# Patient Record
Sex: FEMALE | Race: OTHER | NOT HISPANIC OR LATINO
[De-identification: names, ages, dates, MRNs, and addresses within clinical notes are randomized per-mention and may not be internally consistent; named-entity substitution may affect disease eponyms.]

---

## 2019-09-28 ENCOUNTER — APPOINTMENT (OUTPATIENT)
Dept: ORTHOPEDIC SURGERY | Facility: CLINIC | Age: 54
End: 2019-09-28
Payer: MEDICARE

## 2019-09-28 ENCOUNTER — OUTPATIENT (OUTPATIENT)
Dept: OUTPATIENT SERVICES | Facility: HOSPITAL | Age: 54
LOS: 1 days | End: 2019-09-28
Payer: MEDICARE

## 2019-09-28 ENCOUNTER — APPOINTMENT (OUTPATIENT)
Dept: CT IMAGING | Facility: CLINIC | Age: 54
End: 2019-09-28
Payer: MEDICARE

## 2019-09-28 VITALS
HEART RATE: 83 BPM | DIASTOLIC BLOOD PRESSURE: 90 MMHG | WEIGHT: 175 LBS | HEIGHT: 64 IN | BODY MASS INDEX: 29.88 KG/M2 | SYSTOLIC BLOOD PRESSURE: 142 MMHG

## 2019-09-28 VITALS — WEIGHT: 175 LBS | HEIGHT: 64 IN | BODY MASS INDEX: 29.88 KG/M2

## 2019-09-28 DIAGNOSIS — Z80.9 FAMILY HISTORY OF MALIGNANT NEOPLASM, UNSPECIFIED: ICD-10-CM

## 2019-09-28 DIAGNOSIS — Z82.61 FAMILY HISTORY OF ARTHRITIS: ICD-10-CM

## 2019-09-28 DIAGNOSIS — Z80.0 FAMILY HISTORY OF MALIGNANT NEOPLASM OF DIGESTIVE ORGANS: ICD-10-CM

## 2019-09-28 DIAGNOSIS — Z98.89 OTHER SPECIFIED POSTPROCEDURAL STATES: Chronic | ICD-10-CM

## 2019-09-28 DIAGNOSIS — L40.9 PSORIASIS, UNSPECIFIED: ICD-10-CM

## 2019-09-28 DIAGNOSIS — M25.571 PAIN IN RIGHT ANKLE AND JOINTS OF RIGHT FOOT: ICD-10-CM

## 2019-09-28 DIAGNOSIS — Z80.3 FAMILY HISTORY OF MALIGNANT NEOPLASM OF BREAST: ICD-10-CM

## 2019-09-28 DIAGNOSIS — R10.9 UNSPECIFIED ABDOMINAL PAIN: ICD-10-CM

## 2019-09-28 DIAGNOSIS — Z78.9 OTHER SPECIFIED HEALTH STATUS: ICD-10-CM

## 2019-09-28 DIAGNOSIS — Z60.2 PROBLEMS RELATED TO LIVING ALONE: ICD-10-CM

## 2019-09-28 DIAGNOSIS — M76.61 ACHILLES TENDINITIS, RIGHT LEG: ICD-10-CM

## 2019-09-28 PROCEDURE — 82565 ASSAY OF CREATININE: CPT

## 2019-09-28 PROCEDURE — 74177 CT ABD & PELVIS W/CONTRAST: CPT | Mod: 26

## 2019-09-28 PROCEDURE — 74177 CT ABD & PELVIS W/CONTRAST: CPT

## 2019-09-28 PROCEDURE — 99203 OFFICE O/P NEW LOW 30 MIN: CPT

## 2019-09-28 SDOH — SOCIAL STABILITY - SOCIAL INSECURITY: PROBLEMS RELATED TO LIVING ALONE: Z60.2
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
## 2019-09-28 NOTE — PHYSICAL EXAM
[Slightly Antalgic] : slightly antalgic [DP] : dorsalis pedis 2+ and symmetric bilaterally [Normal] : Oriented to person, place, and time, insight and judgement were intact and the affect was normal [de-identified] : skin warm and well-perfused  [de-identified] : Right Foot/ankle: lateral ankle surgical scar \par mild hindfoot valgus\par pes planus deformity\par pain and restriction with dorsiflexion\par pain with plantarflexion, inversion, eversion\par diffusely tender about ankle, tender AT\par psoriasis plantar foot\par 2+ pitting pedal edema \par unable to single leg heel raise\par \par Left foot:\par pes planus, mild hindfoot valgus\par NT to palpation\par FROM without pain\par 1+ pedal edema\par \par  [de-identified] : overweight [de-identified] : Normal rate, no increased respiratory effort, no use of accessory muscles, no nasal flaring  [de-identified] : .

## 2019-09-28 NOTE — DISCUSSION/SUMMARY
[de-identified] : It is unclear what procedure the patient had performed on her ankle, but she has had problems ever since.  I've sent her for physical therapy. I recommended compression hose if she can tolerate it.  Ice as instructed.  She will obtain her operative reports and imaging studies and will follow up with Dr. Henning in one month.

## 2019-10-02 PROBLEM — Z60.2 PERSON LIVING ALONE: Status: ACTIVE | Noted: 2019-09-28

## 2019-10-03 ENCOUNTER — APPOINTMENT (OUTPATIENT)
Dept: ORTHOPEDIC SURGERY | Facility: CLINIC | Age: 54
End: 2019-10-03

## 2019-10-14 ENCOUNTER — APPOINTMENT (OUTPATIENT)
Dept: SURGERY | Facility: CLINIC | Age: 54
End: 2019-10-14
Payer: COMMERCIAL

## 2019-10-14 VITALS
RESPIRATION RATE: 15 BRPM | BODY MASS INDEX: 29.88 KG/M2 | HEART RATE: 81 BPM | TEMPERATURE: 98.4 F | OXYGEN SATURATION: 98 % | WEIGHT: 175 LBS | DIASTOLIC BLOOD PRESSURE: 86 MMHG | SYSTOLIC BLOOD PRESSURE: 123 MMHG | HEIGHT: 64 IN

## 2019-10-14 DIAGNOSIS — Z86.79 PERSONAL HISTORY OF OTHER DISEASES OF THE CIRCULATORY SYSTEM: ICD-10-CM

## 2019-10-14 DIAGNOSIS — Z87.09 PERSONAL HISTORY OF OTHER DISEASES OF THE RESPIRATORY SYSTEM: ICD-10-CM

## 2019-10-14 DIAGNOSIS — Z86.018 PERSONAL HISTORY OF OTHER BENIGN NEOPLASM: ICD-10-CM

## 2019-10-14 DIAGNOSIS — K21.9 GASTRO-ESOPHAGEAL REFLUX DISEASE W/OUT ESOPHAGITIS: ICD-10-CM

## 2019-10-14 DIAGNOSIS — Z87.39 PERSONAL HISTORY OF OTHER DISEASES OF THE MUSCULOSKELETAL SYSTEM AND CONNECTIVE TISSUE: ICD-10-CM

## 2019-10-14 PROCEDURE — 99205 OFFICE O/P NEW HI 60 MIN: CPT

## 2019-10-14 RX ORDER — OMEPRAZOLE MAGNESIUM 10 MG/1
GRANULE, DELAYED RELEASE ORAL
Refills: 0 | Status: DISCONTINUED | COMMUNITY
End: 2019-10-14

## 2019-10-14 RX ORDER — CHROMIUM 200 MCG
TABLET ORAL
Refills: 0 | Status: ACTIVE | COMMUNITY

## 2019-10-14 RX ORDER — TRIAMTERENE AND HYDROCHLOROTHIAZIDE 25; 37.5 MG/1; MG/1
37.5-25 TABLET ORAL
Refills: 0 | Status: ACTIVE | COMMUNITY

## 2019-10-14 RX ORDER — METFORMIN HYDROCHLORIDE 625 MG/1
TABLET ORAL
Refills: 0 | Status: DISCONTINUED | COMMUNITY
End: 2019-10-14

## 2019-10-14 RX ORDER — OMEPRAZOLE 20 MG/1
20 CAPSULE, DELAYED RELEASE ORAL
Refills: 0 | Status: ACTIVE | COMMUNITY

## 2019-10-14 RX ORDER — MONTELUKAST SODIUM 10 MG/1
TABLET, FILM COATED ORAL
Refills: 0 | Status: DISCONTINUED | COMMUNITY
End: 2019-10-14

## 2019-10-14 RX ORDER — PANCRELIPASE 36000; 180000; 114000 [USP'U]/1; [USP'U]/1; [USP'U]/1
CAPSULE, DELAYED RELEASE PELLETS ORAL
Refills: 0 | Status: ACTIVE | COMMUNITY

## 2019-10-14 RX ORDER — CETIRIZINE HYDROCHLORIDE 10 MG/1
CAPSULE, LIQUID FILLED ORAL
Refills: 0 | Status: ACTIVE | COMMUNITY

## 2019-10-14 RX ORDER — ALBUTEROL 90 MCG
AEROSOL (GRAM) INHALATION
Refills: 0 | Status: ACTIVE | COMMUNITY

## 2019-10-14 RX ORDER — ALBUTEROL SULFATE 4 MG
TABLET,EXTENDED RELEASE MULTIPHASE 12 HR ORAL
Refills: 0 | Status: DISCONTINUED | COMMUNITY
End: 2019-10-14

## 2019-10-14 RX ORDER — GUAIFENESIN 200 MG
CAPSULE ORAL
Refills: 0 | Status: DISCONTINUED | COMMUNITY
End: 2019-10-14

## 2019-10-14 RX ORDER — FLUTICASONE FUROATE AND VILANTEROL TRIFENATATE 50; 25 UG/1; UG/1
POWDER RESPIRATORY (INHALATION)
Refills: 0 | Status: ACTIVE | COMMUNITY

## 2019-10-14 RX ORDER — AMLODIPINE BESYLATE 5 MG/1
TABLET ORAL
Refills: 0 | Status: DISCONTINUED | COMMUNITY
End: 2019-10-14

## 2019-10-14 RX ORDER — AMLODIPINE BESYLATE 5 MG/1
TABLET ORAL
Refills: 0 | Status: ACTIVE | COMMUNITY

## 2019-10-14 NOTE — PHYSICAL EXAM
[JVD] : no jugular venous distention  [Normal Breath Sounds] : Normal breath sounds [Abdominal Masses] : No abdominal masses [No Rash or Lesion] : No rash or lesion [Normal Heart Sounds] : normal heart sounds [Alert] : alert [Oriented to Person] : oriented to person [Oriented to Place] : oriented to place [Oriented to Time] : oriented to time [Calm] : calm [de-identified] : nonicteric [de-identified] : ambulating without difficulty [de-identified] : soft mild obesity o hernias or masses present mild upper quadrant discomfort no peritonitis [de-identified] : full range of motion

## 2019-10-14 NOTE — ASSESSMENT
[FreeTextEntry1] : 53-year-old female with chronic biliary colic and cholelithiasis on sonogram endoscopy and colonoscopy were performed she was treated for H. pylori. But still has discomfort in the right upper quadrant radiating to the back. Given the sonographic findings and the history we have recommended a laparoscopic cholecystectomy. The risks benefits and expectations were discussed at length with the patient all of her questions were answered.

## 2019-10-14 NOTE — REVIEW OF SYSTEMS
[As Noted in HPI] : as noted in HPI [Negative] : Heme/Lymph [Heartburn] : heartburn [Joint Pain] : joint pain [Dizziness] : dizziness [Depression] : depression [FreeTextEntry7] : Abdominal bloating

## 2019-10-14 NOTE — HISTORY OF PRESENT ILLNESS
[de-identified] : Abdominal sonogram from 8/26/18 demonstrated cholelithiasis without significant gallbladder wall thickening or pericholecystic fluid.

## 2019-10-14 NOTE — CONSULT LETTER
[Dear  ___] : Dear  [unfilled], [Consult Letter:] : I had the pleasure of evaluating your patient, [unfilled]. [Please see my note below.] : Please see my note below. [Sincerely,] : Sincerely, [Consult Closing:] : Thank you very much for allowing me to participate in the care of this patient.  If you have any questions, please do not hesitate to contact me. [FreeTextEntry3] : Alejandro Padilla MD, FACS, FASMBS\par Chief Division of Minimally Invasive Surgery\par Co-Director of Bariatric Surgery \par [facility]\[place] [address]

## 2019-10-29 ENCOUNTER — APPOINTMENT (OUTPATIENT)
Dept: ORTHOPEDIC SURGERY | Facility: CLINIC | Age: 54
End: 2019-10-29
Payer: MEDICARE

## 2019-10-29 PROCEDURE — 73610 X-RAY EXAM OF ANKLE: CPT | Mod: 26,RT

## 2019-10-29 PROCEDURE — 73620 X-RAY EXAM OF FOOT: CPT | Mod: 26

## 2019-10-29 PROCEDURE — 99214 OFFICE O/P EST MOD 30 MIN: CPT

## 2019-11-26 ENCOUNTER — APPOINTMENT (OUTPATIENT)
Dept: ORTHOPEDIC SURGERY | Facility: CLINIC | Age: 54
End: 2019-11-26
Payer: MEDICARE

## 2019-11-26 DIAGNOSIS — M24.9 JOINT DERANGEMENT, UNSPECIFIED: ICD-10-CM

## 2019-11-26 DIAGNOSIS — M67.88 OTHER SPECIFIED DISORDERS OF SYNOVIUM AND TENDON, OTHER SITE: ICD-10-CM

## 2019-11-26 PROCEDURE — 99213 OFFICE O/P EST LOW 20 MIN: CPT

## 2019-11-27 PROBLEM — M67.88 ACHILLES TENDINOSIS OF RIGHT LOWER EXTREMITY: Status: ACTIVE | Noted: 2019-10-29

## 2019-11-27 PROBLEM — M24.9 INTERNAL DERANGEMENT OF ANKLE: Status: ACTIVE | Noted: 2019-10-29

## 2019-12-24 ENCOUNTER — APPOINTMENT (OUTPATIENT)
Dept: ORTHOPEDIC SURGERY | Facility: CLINIC | Age: 54
End: 2019-12-24

## 2020-01-07 ENCOUNTER — OUTPATIENT (OUTPATIENT)
Dept: OUTPATIENT SERVICES | Facility: HOSPITAL | Age: 55
LOS: 1 days | End: 2020-01-07
Payer: MEDICARE

## 2020-01-07 VITALS
TEMPERATURE: 98 F | RESPIRATION RATE: 18 BRPM | SYSTOLIC BLOOD PRESSURE: 148 MMHG | HEIGHT: 64 IN | HEART RATE: 92 BPM | OXYGEN SATURATION: 97 % | DIASTOLIC BLOOD PRESSURE: 94 MMHG | WEIGHT: 179.02 LBS

## 2020-01-07 DIAGNOSIS — I10 ESSENTIAL (PRIMARY) HYPERTENSION: ICD-10-CM

## 2020-01-07 DIAGNOSIS — Z01.818 ENCOUNTER FOR OTHER PREPROCEDURAL EXAMINATION: ICD-10-CM

## 2020-01-07 DIAGNOSIS — Z98.890 OTHER SPECIFIED POSTPROCEDURAL STATES: Chronic | ICD-10-CM

## 2020-01-07 DIAGNOSIS — J45.909 UNSPECIFIED ASTHMA, UNCOMPLICATED: ICD-10-CM

## 2020-01-07 DIAGNOSIS — Z98.89 OTHER SPECIFIED POSTPROCEDURAL STATES: Chronic | ICD-10-CM

## 2020-01-07 DIAGNOSIS — Z91.040 LATEX ALLERGY STATUS: ICD-10-CM

## 2020-01-07 DIAGNOSIS — K80.20 CALCULUS OF GALLBLADDER WITHOUT CHOLECYSTITIS WITHOUT OBSTRUCTION: ICD-10-CM

## 2020-01-07 DIAGNOSIS — K21.9 GASTRO-ESOPHAGEAL REFLUX DISEASE WITHOUT ESOPHAGITIS: ICD-10-CM

## 2020-01-07 DIAGNOSIS — K80.50 CALCULUS OF BILE DUCT WITHOUT CHOLANGITIS OR CHOLECYSTITIS WITHOUT OBSTRUCTION: ICD-10-CM

## 2020-01-07 LAB
ALBUMIN SERPL ELPH-MCNC: 4.3 G/DL — SIGNIFICANT CHANGE UP (ref 3.3–5)
ALP SERPL-CCNC: 105 U/L — SIGNIFICANT CHANGE UP (ref 40–120)
ALT FLD-CCNC: 24 U/L — SIGNIFICANT CHANGE UP (ref 10–45)
ANION GAP SERPL CALC-SCNC: 16 MMOL/L — SIGNIFICANT CHANGE UP (ref 5–17)
AST SERPL-CCNC: 16 U/L — SIGNIFICANT CHANGE UP (ref 10–40)
BILIRUB SERPL-MCNC: 0.2 MG/DL — SIGNIFICANT CHANGE UP (ref 0.2–1.2)
BUN SERPL-MCNC: 12 MG/DL — SIGNIFICANT CHANGE UP (ref 7–23)
CALCIUM SERPL-MCNC: 9.3 MG/DL — SIGNIFICANT CHANGE UP (ref 8.4–10.5)
CHLORIDE SERPL-SCNC: 96 MMOL/L — SIGNIFICANT CHANGE UP (ref 96–108)
CO2 SERPL-SCNC: 26 MMOL/L — SIGNIFICANT CHANGE UP (ref 22–31)
CREAT SERPL-MCNC: 0.61 MG/DL — SIGNIFICANT CHANGE UP (ref 0.5–1.3)
GLUCOSE SERPL-MCNC: 112 MG/DL — HIGH (ref 70–99)
HCT VFR BLD CALC: 40.6 % — SIGNIFICANT CHANGE UP (ref 34.5–45)
HGB BLD-MCNC: 13.5 G/DL — SIGNIFICANT CHANGE UP (ref 11.5–15.5)
MCHC RBC-ENTMCNC: 29.3 PG — SIGNIFICANT CHANGE UP (ref 27–34)
MCHC RBC-ENTMCNC: 33.3 GM/DL — SIGNIFICANT CHANGE UP (ref 32–36)
MCV RBC AUTO: 88.3 FL — SIGNIFICANT CHANGE UP (ref 80–100)
PLATELET # BLD AUTO: 471 K/UL — HIGH (ref 150–400)
POTASSIUM SERPL-MCNC: 2.9 MMOL/L — CRITICAL LOW (ref 3.5–5.3)
POTASSIUM SERPL-SCNC: 2.9 MMOL/L — CRITICAL LOW (ref 3.5–5.3)
PROT SERPL-MCNC: 7.5 G/DL — SIGNIFICANT CHANGE UP (ref 6–8.3)
RBC # BLD: 4.6 M/UL — SIGNIFICANT CHANGE UP (ref 3.8–5.2)
RBC # FLD: 13.9 % — SIGNIFICANT CHANGE UP (ref 10.3–14.5)
SODIUM SERPL-SCNC: 138 MMOL/L — SIGNIFICANT CHANGE UP (ref 135–145)
WBC # BLD: 11.21 K/UL — HIGH (ref 3.8–10.5)
WBC # FLD AUTO: 11.21 K/UL — HIGH (ref 3.8–10.5)

## 2020-01-07 PROCEDURE — G0463: CPT

## 2020-01-07 PROCEDURE — 80053 COMPREHEN METABOLIC PANEL: CPT

## 2020-01-07 PROCEDURE — 85027 COMPLETE CBC AUTOMATED: CPT

## 2020-01-07 RX ORDER — CEFAZOLIN SODIUM 1 G
2000 VIAL (EA) INJECTION ONCE
Refills: 0 | Status: DISCONTINUED | OUTPATIENT
Start: 2020-01-09 | End: 2020-02-02

## 2020-01-07 NOTE — H&P PST ADULT - NSICDXPROBLEM_GEN_ALL_CORE_FT
PROBLEM DIAGNOSES  Problem: Calculus of gallbladder without cholecystitis without obstruction  Assessment and Plan: laparoscopic cholecystectomy    Problem: Chronic GERD  Assessment and Plan: continue PPI    Problem: Hypertension  Assessment and Plan: continue meds    Problem: Asthma  Assessment and Plan: continue meds PROBLEM DIAGNOSES  Problem: Latex allergy  Assessment and Plan: notified OR booking    Problem: Calculus of gallbladder without cholecystitis without obstruction  Assessment and Plan: laparoscopic cholecystectomy    Problem: Chronic GERD  Assessment and Plan: continue PPI    Problem: Hypertension  Assessment and Plan: continue meds    Problem: Asthma  Assessment and Plan: continue meds

## 2020-01-07 NOTE — H&P PST ADULT - NSICDXPASTSURGICALHX_GEN_ALL_CORE_FT
PAST SURGICAL HISTORY:  History of ankle surgery     History of myomectomy     History of tonsillectomy and adenoidectomy at age 5

## 2020-01-07 NOTE — H&P PST ADULT - NSANTHOSAYNRD_GEN_A_CORE
No. TRISTIN screening performed.  STOP BANG Legend: 0-2 = LOW Risk; 3-4 = INTERMEDIATE Risk; 5-8 = HIGH Risk

## 2020-01-07 NOTE — H&P PST ADULT - NSICDXFAMILYHX_GEN_ALL_CORE_FT
FAMILY HISTORY:  Family history of hyperlipidemia  Family history of hypertension    Mother  Still living? Yes, Estimated age: 71-80  Family history of diabetes mellitus, Age at diagnosis: Age Unknown

## 2020-01-07 NOTE — H&P PST ADULT - NSICDXPASTMEDICALHX_GEN_ALL_CORE_FT
PAST MEDICAL HISTORY:  Asthma uses albuterol PRN , stable    Eczema     GERD (gastroesophageal reflux disease)     Hiatal hernia     HTN (hypertension)     Menorrhagia     Seasonal allergies

## 2020-01-08 ENCOUNTER — RESULT REVIEW (OUTPATIENT)
Age: 55
End: 2020-01-08

## 2020-01-08 DIAGNOSIS — E87.6 HYPOKALEMIA: ICD-10-CM

## 2020-01-08 RX ORDER — POTASSIUM CHLORIDE 1500 MG/1
20 TABLET, EXTENDED RELEASE ORAL
Qty: 3 | Refills: 0 | Status: ACTIVE | COMMUNITY
Start: 2020-01-08 | End: 1900-01-01

## 2020-01-09 ENCOUNTER — OUTPATIENT (OUTPATIENT)
Dept: OUTPATIENT SERVICES | Facility: HOSPITAL | Age: 55
LOS: 1 days | End: 2020-01-09
Payer: MEDICARE

## 2020-01-09 ENCOUNTER — APPOINTMENT (OUTPATIENT)
Dept: SURGERY | Facility: HOSPITAL | Age: 55
End: 2020-01-09
Payer: MEDICARE

## 2020-01-09 VITALS — OXYGEN SATURATION: 97 % | RESPIRATION RATE: 16 BRPM | HEART RATE: 84 BPM

## 2020-01-09 VITALS
TEMPERATURE: 98 F | HEIGHT: 64 IN | OXYGEN SATURATION: 98 % | DIASTOLIC BLOOD PRESSURE: 84 MMHG | HEART RATE: 81 BPM | RESPIRATION RATE: 18 BRPM | SYSTOLIC BLOOD PRESSURE: 128 MMHG | WEIGHT: 179.02 LBS

## 2020-01-09 DIAGNOSIS — Z98.89 OTHER SPECIFIED POSTPROCEDURAL STATES: Chronic | ICD-10-CM

## 2020-01-09 DIAGNOSIS — K80.50 CALCULUS OF BILE DUCT WITHOUT CHOLANGITIS OR CHOLECYSTITIS WITHOUT OBSTRUCTION: ICD-10-CM

## 2020-01-09 DIAGNOSIS — Z98.890 OTHER SPECIFIED POSTPROCEDURAL STATES: Chronic | ICD-10-CM

## 2020-01-09 LAB
BASE EXCESS BLDV CALC-SCNC: 3.9 MMOL/L — HIGH (ref -2–2)
CA-I SERPL-SCNC: 1.19 MMOL/L — SIGNIFICANT CHANGE UP (ref 1.12–1.3)
CHLORIDE BLDV-SCNC: 103 MMOL/L — SIGNIFICANT CHANGE UP (ref 96–108)
CO2 BLDV-SCNC: 31 MMOL/L — HIGH (ref 22–30)
GAS PNL BLDV: 140 MMOL/L — SIGNIFICANT CHANGE UP (ref 135–145)
GAS PNL BLDV: SIGNIFICANT CHANGE UP
GAS PNL BLDV: SIGNIFICANT CHANGE UP
GLUCOSE BLDV-MCNC: 118 MG/DL — HIGH (ref 70–99)
HCG UR QL: NEGATIVE — SIGNIFICANT CHANGE UP
HCO3 BLDV-SCNC: 29 MMOL/L — SIGNIFICANT CHANGE UP (ref 21–29)
HCT VFR BLDA CALC: 42 % — SIGNIFICANT CHANGE UP (ref 39–50)
HGB BLD CALC-MCNC: 13.7 G/DL — SIGNIFICANT CHANGE UP (ref 11.5–15.5)
LACTATE BLDV-MCNC: 1.9 MMOL/L — SIGNIFICANT CHANGE UP (ref 0.7–2)
OTHER CELLS CSF MANUAL: 8 ML/DL — LOW (ref 18–22)
PCO2 BLDV: 49 MMHG — SIGNIFICANT CHANGE UP (ref 35–50)
PH BLDV: 7.39 — SIGNIFICANT CHANGE UP (ref 7.35–7.45)
PO2 BLDV: 25 MMHG — SIGNIFICANT CHANGE UP (ref 25–45)
POTASSIUM BLDV-SCNC: 3.3 MMOL/L — LOW (ref 3.5–5.3)
SAO2 % BLDV: 41 % — LOW (ref 67–88)

## 2020-01-09 PROCEDURE — 82330 ASSAY OF CALCIUM: CPT

## 2020-01-09 PROCEDURE — 82803 BLOOD GASES ANY COMBINATION: CPT

## 2020-01-09 PROCEDURE — 81025 URINE PREGNANCY TEST: CPT

## 2020-01-09 PROCEDURE — 85014 HEMATOCRIT: CPT

## 2020-01-09 PROCEDURE — 88304 TISSUE EXAM BY PATHOLOGIST: CPT | Mod: 26

## 2020-01-09 PROCEDURE — 84132 ASSAY OF SERUM POTASSIUM: CPT

## 2020-01-09 PROCEDURE — 88304 TISSUE EXAM BY PATHOLOGIST: CPT

## 2020-01-09 PROCEDURE — 82435 ASSAY OF BLOOD CHLORIDE: CPT

## 2020-01-09 PROCEDURE — 47562 LAPAROSCOPIC CHOLECYSTECTOMY: CPT

## 2020-01-09 PROCEDURE — 84295 ASSAY OF SERUM SODIUM: CPT

## 2020-01-09 PROCEDURE — C1889: CPT

## 2020-01-09 PROCEDURE — 83605 ASSAY OF LACTIC ACID: CPT

## 2020-01-09 PROCEDURE — 82947 ASSAY GLUCOSE BLOOD QUANT: CPT

## 2020-01-09 RX ORDER — NYSTATIN 500MM UNIT
4 POWDER (EA) MISCELLANEOUS
Qty: 0 | Refills: 0 | DISCHARGE

## 2020-01-09 RX ORDER — FLUTICASONE FUROATE AND VILANTEROL TRIFENATATE 100; 25 UG/1; UG/1
1 POWDER RESPIRATORY (INHALATION)
Qty: 0 | Refills: 0 | DISCHARGE

## 2020-01-09 RX ORDER — ONDANSETRON 8 MG/1
4 TABLET, FILM COATED ORAL EVERY 4 HOURS
Refills: 0 | Status: DISCONTINUED | OUTPATIENT
Start: 2020-01-09 | End: 2020-01-09

## 2020-01-09 RX ORDER — SODIUM CHLORIDE 9 MG/ML
1000 INJECTION, SOLUTION INTRAVENOUS
Refills: 0 | Status: DISCONTINUED | OUTPATIENT
Start: 2020-01-09 | End: 2020-02-02

## 2020-01-09 RX ORDER — OMEPRAZOLE 10 MG/1
1 CAPSULE, DELAYED RELEASE ORAL
Qty: 0 | Refills: 0 | DISCHARGE

## 2020-01-09 RX ORDER — CHOLECALCIFEROL (VITAMIN D3) 125 MCG
1 CAPSULE ORAL
Qty: 0 | Refills: 0 | DISCHARGE

## 2020-01-09 RX ORDER — LIPASE/PROTEASE/AMYLASE 16-48-48K
1 CAPSULE,DELAYED RELEASE (ENTERIC COATED) ORAL
Qty: 0 | Refills: 0 | DISCHARGE

## 2020-01-09 RX ORDER — OXYCODONE HYDROCHLORIDE 5 MG/1
1 TABLET ORAL
Qty: 15 | Refills: 0
Start: 2020-01-09 | End: 2020-01-13

## 2020-01-09 RX ORDER — DIAZEPAM 5 MG
1 TABLET ORAL
Qty: 0 | Refills: 0 | DISCHARGE

## 2020-01-09 RX ORDER — OXYCODONE HYDROCHLORIDE 5 MG/1
5 TABLET ORAL ONCE
Refills: 0 | Status: DISCONTINUED | OUTPATIENT
Start: 2020-01-09 | End: 2020-01-09

## 2020-01-09 RX ORDER — AMLODIPINE BESYLATE 2.5 MG/1
1 TABLET ORAL
Qty: 0 | Refills: 0 | DISCHARGE

## 2020-01-09 RX ORDER — ASCORBIC ACID 60 MG
1 TABLET,CHEWABLE ORAL
Qty: 0 | Refills: 0 | DISCHARGE

## 2020-01-09 RX ORDER — ALBUTEROL 90 UG/1
2 AEROSOL, METERED ORAL
Qty: 0 | Refills: 0 | DISCHARGE

## 2020-01-09 RX ORDER — CHLORHEXIDINE GLUCONATE 213 G/1000ML
1 SOLUTION TOPICAL ONCE
Refills: 0 | Status: DISCONTINUED | OUTPATIENT
Start: 2020-01-09 | End: 2020-01-09

## 2020-01-09 RX ORDER — TRIAMTERENE/HYDROCHLOROTHIAZID 75 MG-50MG
1 TABLET ORAL
Qty: 0 | Refills: 0 | DISCHARGE

## 2020-01-09 RX ORDER — MELOXICAM 15 MG/1
1 TABLET ORAL
Qty: 0 | Refills: 0 | DISCHARGE

## 2020-01-09 RX ORDER — CETIRIZINE HYDROCHLORIDE 10 MG/1
1 TABLET ORAL
Qty: 0 | Refills: 0 | DISCHARGE

## 2020-01-09 RX ORDER — HYDROMORPHONE HYDROCHLORIDE 2 MG/ML
0.25 INJECTION INTRAMUSCULAR; INTRAVENOUS; SUBCUTANEOUS
Refills: 0 | Status: DISCONTINUED | OUTPATIENT
Start: 2020-01-09 | End: 2020-01-09

## 2020-01-09 RX ORDER — ESCITALOPRAM OXALATE 10 MG/1
1 TABLET, FILM COATED ORAL
Qty: 0 | Refills: 0 | DISCHARGE

## 2020-01-09 RX ORDER — LIDOCAINE HCL 20 MG/ML
0.2 VIAL (ML) INJECTION ONCE
Refills: 0 | Status: DISCONTINUED | OUTPATIENT
Start: 2020-01-09 | End: 2020-01-09

## 2020-01-09 RX ORDER — ACETAMINOPHEN 500 MG
975 TABLET ORAL ONCE
Refills: 0 | Status: COMPLETED | OUTPATIENT
Start: 2020-01-09 | End: 2020-01-09

## 2020-01-09 RX ORDER — SODIUM CHLORIDE 9 MG/ML
3 INJECTION INTRAMUSCULAR; INTRAVENOUS; SUBCUTANEOUS EVERY 8 HOURS
Refills: 0 | Status: DISCONTINUED | OUTPATIENT
Start: 2020-01-09 | End: 2020-01-09

## 2020-01-09 RX ADMIN — HYDROMORPHONE HYDROCHLORIDE 0.25 MILLIGRAM(S): 2 INJECTION INTRAMUSCULAR; INTRAVENOUS; SUBCUTANEOUS at 12:45

## 2020-01-09 RX ADMIN — HYDROMORPHONE HYDROCHLORIDE 0.25 MILLIGRAM(S): 2 INJECTION INTRAMUSCULAR; INTRAVENOUS; SUBCUTANEOUS at 11:29

## 2020-01-09 RX ADMIN — OXYCODONE HYDROCHLORIDE 5 MILLIGRAM(S): 5 TABLET ORAL at 15:13

## 2020-01-09 RX ADMIN — HYDROMORPHONE HYDROCHLORIDE 0.25 MILLIGRAM(S): 2 INJECTION INTRAMUSCULAR; INTRAVENOUS; SUBCUTANEOUS at 13:00

## 2020-01-09 RX ADMIN — Medication 975 MILLIGRAM(S): at 17:21

## 2020-01-09 RX ADMIN — HYDROMORPHONE HYDROCHLORIDE 0.25 MILLIGRAM(S): 2 INJECTION INTRAMUSCULAR; INTRAVENOUS; SUBCUTANEOUS at 11:45

## 2020-01-09 RX ADMIN — OXYCODONE HYDROCHLORIDE 5 MILLIGRAM(S): 5 TABLET ORAL at 14:43

## 2020-01-09 NOTE — ASU DISCHARGE PLAN (ADULT/PEDIATRIC) - PAIN MANAGEMENT
Prescriptions electronically submitted to pharmacy from [place]/Take over the counter pain medication

## 2020-01-09 NOTE — PRE-ANESTHESIA EVALUATION ADULT - NSANTHPMHFT_GEN_ALL_CORE
Asthma - takes Breo daily, Albuterol PRN - last use this AM prophylactically, no h/o hospitalizations/intubations for asthma  Hiatal hernia/GERD - controlled with Rx  Hypokalemia with PST labs - VBG this AM, K at 3.3

## 2020-01-09 NOTE — ASU DISCHARGE PLAN (ADULT/PEDIATRIC) - CARE PROVIDER_API CALL
Alejandro Padilla)  Surgery  [address], [address]  [place] [address]  Phone: [phone]  Fax: [phone]  Established Patient  Follow Up Time: 2 weeks

## 2020-01-09 NOTE — ASU DISCHARGE PLAN (ADULT/PEDIATRIC) - CALL YOUR DOCTOR IF YOU HAVE ANY OF THE FOLLOWING:
Wound/Surgical Site with redness, or foul smelling discharge or pus/Inability to tolerate liquids or foods/Nausea and vomiting that does not stop/Fever greater than (need to indicate Fahrenheit or Celsius)/Pain not relieved by Medications/Bleeding that does not stop

## 2020-01-13 DIAGNOSIS — K59.00 CONSTIPATION, UNSPECIFIED: ICD-10-CM

## 2020-01-24 ENCOUNTER — APPOINTMENT (OUTPATIENT)
Dept: SURGERY | Facility: CLINIC | Age: 55
End: 2020-01-24
Payer: MEDICARE

## 2020-01-24 VITALS
TEMPERATURE: 98.2 F | OXYGEN SATURATION: 97 % | HEART RATE: 81 BPM | SYSTOLIC BLOOD PRESSURE: 137 MMHG | DIASTOLIC BLOOD PRESSURE: 90 MMHG | RESPIRATION RATE: 17 BRPM | BODY MASS INDEX: 29.88 KG/M2 | WEIGHT: 175 LBS | HEIGHT: 64 IN

## 2020-01-24 DIAGNOSIS — Z09 ENCOUNTER FOR FOLLOW-UP EXAMINATION AFTER COMPLETED TREATMENT FOR CONDITIONS OTHER THAN MALIGNANT NEOPLASM: ICD-10-CM

## 2020-01-24 DIAGNOSIS — K80.50 CALCULUS OF BILE DUCT W/OUT CHOLANGITIS OR CHOLECYSTITIS W/OUT OBSTRUCTION: ICD-10-CM

## 2020-01-24 DIAGNOSIS — Z87.19 PERSONAL HISTORY OF OTHER DISEASES OF THE DIGESTIVE SYSTEM: ICD-10-CM

## 2020-01-24 PROCEDURE — 99024 POSTOP FOLLOW-UP VISIT: CPT

## 2020-01-24 RX ORDER — ESCITALOPRAM OXALATE 5 MG/1
TABLET, FILM COATED ORAL
Refills: 0 | Status: DISCONTINUED | COMMUNITY
End: 2020-01-24

## 2020-01-24 RX ORDER — LORATADINE 5 MG
17 TABLET,CHEWABLE ORAL
Qty: 1 | Refills: 11 | Status: DISCONTINUED | COMMUNITY
Start: 2020-01-13 | End: 2020-01-24

## 2020-01-24 RX ORDER — MONTELUKAST SODIUM 10 MG/1
TABLET, FILM COATED ORAL
Refills: 0 | Status: DISCONTINUED | COMMUNITY
End: 2020-01-24

## 2020-01-24 RX ORDER — VITAMIN E ACID SUCCINATE 268 MG
TABLET ORAL
Refills: 0 | Status: ACTIVE | COMMUNITY

## 2020-01-24 RX ORDER — PREDNISONE 50 MG/1
TABLET ORAL
Refills: 0 | Status: DISCONTINUED | COMMUNITY
End: 2020-01-24

## 2020-01-24 NOTE — REASON FOR VISIT
[Post Op: _________] : a [unfilled] post op visit [FreeTextEntry1] : followup after her laparoscopic cholecystectomy

## 2020-01-24 NOTE — HISTORY OF PRESENT ILLNESS
[de-identified] : Austin is a 55 y/o female here for a post op visit following a lap breonna. Path =cholelithiasis with chronic cholecystitis and cholesterolosis. [de-identified] : The patient initially had umbilical pain after the surgery, but now complains of an intermittent right upper quadrant soreness. It gets worse after sitting up for several hours. She has been tolerating a diet, but has had some nausea over the past few days. She denies any fever, chills or vomiting. She feels that her asthma and psoriasis have been aggravated since her surgery, however the timing also corresponds with a change in the weather. She states her pain has improved since the immediate post operative period. 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 no

## 2020-01-24 NOTE — PHYSICAL EXAM
[Normal Breath Sounds] : Normal breath sounds [Normal Rate and Rhythm] : normal rate and rhythm [No Rash or Lesion] : No rash or lesion [Alert] : alert [Oriented to Time] : oriented to time [Oriented to Person] : oriented to person [Oriented to Place] : oriented to place [Calm] : calm [de-identified] : no scleral icterus [de-identified] : No acute distress [de-identified] : soft, nondistended, mild RUQ tenderness to palpation, incisions clean/dry/intact

## 2020-01-24 NOTE — ASSESSMENT
[FreeTextEntry1] : 54 year old female s/p laparoscopic cholecystectomy for chronic cholecystitis\par \par patient is doing well she was seen and examined in the note verified she will followup if she has any complaintsall of her questions were answered

## 2020-01-24 NOTE — REVIEW OF SYSTEMS
[Wheezing] : wheezing [Cough] : cough [Abdominal Pain] : abdominal pain [Negative] : Heme/Lymph [Vomiting] : no vomiting [Constipation] : no constipation [FreeTextEntry7] : nausea [de-identified] : dry skin/psoriasis

## 2020-02-13 ENCOUNTER — APPOINTMENT (OUTPATIENT)
Dept: NEUROLOGY | Facility: CLINIC | Age: 55
End: 2020-02-13

## 2020-02-25 ENCOUNTER — APPOINTMENT (OUTPATIENT)
Dept: ORTHOPEDIC SURGERY | Facility: CLINIC | Age: 55
End: 2020-02-25
Payer: MEDICARE

## 2020-02-25 DIAGNOSIS — M25.674 STIFFNESS OF RIGHT FOOT, NOT ELSEWHERE CLASSIFIED: ICD-10-CM

## 2020-02-25 PROCEDURE — 99214 OFFICE O/P EST MOD 30 MIN: CPT

## 2020-03-01 ENCOUNTER — OUTPATIENT (OUTPATIENT)
Dept: OUTPATIENT SERVICES | Facility: HOSPITAL | Age: 55
LOS: 1 days | End: 2020-03-01

## 2020-03-01 DIAGNOSIS — Z98.890 OTHER SPECIFIED POSTPROCEDURAL STATES: Chronic | ICD-10-CM

## 2020-03-01 DIAGNOSIS — Z98.89 OTHER SPECIFIED POSTPROCEDURAL STATES: Chronic | ICD-10-CM

## 2020-03-09 ENCOUNTER — APPOINTMENT (OUTPATIENT)
Dept: NEUROLOGY | Facility: CLINIC | Age: 55
End: 2020-03-09
Payer: MEDICARE

## 2020-03-09 ENCOUNTER — EMERGENCY (EMERGENCY)
Facility: HOSPITAL | Age: 55
LOS: 1 days | Discharge: HOME | End: 2020-03-09
Attending: EMERGENCY MEDICINE
Payer: MEDICARE

## 2020-03-09 VITALS
TEMPERATURE: 98 F | WEIGHT: 179.9 LBS | RESPIRATION RATE: 19 BRPM | SYSTOLIC BLOOD PRESSURE: 138 MMHG | DIASTOLIC BLOOD PRESSURE: 94 MMHG | HEART RATE: 91 BPM | HEIGHT: 64 IN | OXYGEN SATURATION: 98 %

## 2020-03-09 VITALS
TEMPERATURE: 98.4 F | HEIGHT: 64 IN | DIASTOLIC BLOOD PRESSURE: 90 MMHG | BODY MASS INDEX: 29.88 KG/M2 | HEART RATE: 90 BPM | SYSTOLIC BLOOD PRESSURE: 141 MMHG | WEIGHT: 175 LBS

## 2020-03-09 DIAGNOSIS — Z98.89 OTHER SPECIFIED POSTPROCEDURAL STATES: Chronic | ICD-10-CM

## 2020-03-09 DIAGNOSIS — Z98.890 OTHER SPECIFIED POSTPROCEDURAL STATES: Chronic | ICD-10-CM

## 2020-03-09 LAB
ALBUMIN SERPL ELPH-MCNC: 3.9 G/DL — SIGNIFICANT CHANGE UP (ref 3.3–5)
ALP SERPL-CCNC: 98 U/L — SIGNIFICANT CHANGE UP (ref 40–120)
ALT FLD-CCNC: 25 U/L — SIGNIFICANT CHANGE UP (ref 10–45)
ANION GAP SERPL CALC-SCNC: 15 MMOL/L — SIGNIFICANT CHANGE UP (ref 5–17)
APTT BLD: 27 SEC — LOW (ref 27.5–36.3)
AST SERPL-CCNC: 14 U/L — SIGNIFICANT CHANGE UP (ref 10–40)
BASOPHILS # BLD AUTO: 0.04 K/UL — SIGNIFICANT CHANGE UP (ref 0–0.2)
BASOPHILS NFR BLD AUTO: 0.3 % — SIGNIFICANT CHANGE UP (ref 0–2)
BILIRUB SERPL-MCNC: 0.2 MG/DL — SIGNIFICANT CHANGE UP (ref 0.2–1.2)
BUN SERPL-MCNC: 12 MG/DL — SIGNIFICANT CHANGE UP (ref 7–23)
CALCIUM SERPL-MCNC: 9.1 MG/DL — SIGNIFICANT CHANGE UP (ref 8.4–10.5)
CHLORIDE SERPL-SCNC: 100 MMOL/L — SIGNIFICANT CHANGE UP (ref 96–108)
CO2 SERPL-SCNC: 24 MMOL/L — SIGNIFICANT CHANGE UP (ref 22–31)
CREAT SERPL-MCNC: 0.59 MG/DL — SIGNIFICANT CHANGE UP (ref 0.5–1.3)
EOSINOPHIL # BLD AUTO: 0.09 K/UL — SIGNIFICANT CHANGE UP (ref 0–0.5)
EOSINOPHIL NFR BLD AUTO: 0.7 % — SIGNIFICANT CHANGE UP (ref 0–6)
GLUCOSE SERPL-MCNC: 90 MG/DL — SIGNIFICANT CHANGE UP (ref 70–99)
HCT VFR BLD CALC: 40.3 % — SIGNIFICANT CHANGE UP (ref 34.5–45)
HGB BLD-MCNC: 13 G/DL — SIGNIFICANT CHANGE UP (ref 11.5–15.5)
IMM GRANULOCYTES NFR BLD AUTO: 0.7 % — SIGNIFICANT CHANGE UP (ref 0–1.5)
INR BLD: 0.97 RATIO — SIGNIFICANT CHANGE UP (ref 0.88–1.16)
LYMPHOCYTES # BLD AUTO: 23.1 % — SIGNIFICANT CHANGE UP (ref 13–44)
LYMPHOCYTES # BLD AUTO: 3.1 K/UL — SIGNIFICANT CHANGE UP (ref 1–3.3)
MCHC RBC-ENTMCNC: 29.6 PG — SIGNIFICANT CHANGE UP (ref 27–34)
MCHC RBC-ENTMCNC: 32.3 GM/DL — SIGNIFICANT CHANGE UP (ref 32–36)
MCV RBC AUTO: 91.8 FL — SIGNIFICANT CHANGE UP (ref 80–100)
MONOCYTES # BLD AUTO: 1.23 K/UL — HIGH (ref 0–0.9)
MONOCYTES NFR BLD AUTO: 9.2 % — SIGNIFICANT CHANGE UP (ref 2–14)
NEUTROPHILS # BLD AUTO: 8.88 K/UL — HIGH (ref 1.8–7.4)
NEUTROPHILS NFR BLD AUTO: 66 % — SIGNIFICANT CHANGE UP (ref 43–77)
NRBC # BLD: 0 /100 WBCS — SIGNIFICANT CHANGE UP (ref 0–0)
PLATELET # BLD AUTO: 462 K/UL — HIGH (ref 150–400)
POTASSIUM SERPL-MCNC: 3.3 MMOL/L — LOW (ref 3.5–5.3)
POTASSIUM SERPL-SCNC: 3.3 MMOL/L — LOW (ref 3.5–5.3)
PROT SERPL-MCNC: 7 G/DL — SIGNIFICANT CHANGE UP (ref 6–8.3)
PROTHROM AB SERPL-ACNC: 11 SEC — SIGNIFICANT CHANGE UP (ref 10–12.9)
RBC # BLD: 4.39 M/UL — SIGNIFICANT CHANGE UP (ref 3.8–5.2)
RBC # FLD: 13.9 % — SIGNIFICANT CHANGE UP (ref 10.3–14.5)
SODIUM SERPL-SCNC: 139 MMOL/L — SIGNIFICANT CHANGE UP (ref 135–145)
WBC # BLD: 13.44 K/UL — HIGH (ref 3.8–10.5)
WBC # FLD AUTO: 13.44 K/UL — HIGH (ref 3.8–10.5)

## 2020-03-09 PROCEDURE — 70498 CT ANGIOGRAPHY NECK: CPT | Mod: 26

## 2020-03-09 PROCEDURE — 99201 OFFICE OUTPATIENT NEW 10 MINUTES: CPT

## 2020-03-09 PROCEDURE — 70496 CT ANGIOGRAPHY HEAD: CPT | Mod: 26

## 2020-03-09 PROCEDURE — 99218: CPT

## 2020-03-09 RX ORDER — ESCITALOPRAM OXALATE 10 MG/1
10 TABLET, FILM COATED ORAL AT BEDTIME
Refills: 0 | Status: DISCONTINUED | OUTPATIENT
Start: 2020-03-09 | End: 2020-03-13

## 2020-03-09 RX ORDER — LORATADINE 10 MG/1
10 TABLET ORAL DAILY
Refills: 0 | Status: DISCONTINUED | OUTPATIENT
Start: 2020-03-09 | End: 2020-03-13

## 2020-03-09 RX ORDER — TRIAMTERENE/HYDROCHLOROTHIAZID 75 MG-50MG
1 TABLET ORAL DAILY
Refills: 0 | Status: DISCONTINUED | OUTPATIENT
Start: 2020-03-09 | End: 2020-03-13

## 2020-03-09 RX ORDER — LIPASE/PROTEASE/AMYLASE 16-48-48K
1 CAPSULE,DELAYED RELEASE (ENTERIC COATED) ORAL
Refills: 0 | Status: DISCONTINUED | OUTPATIENT
Start: 2020-03-09 | End: 2020-03-09

## 2020-03-09 RX ORDER — SODIUM CHLORIDE 9 MG/ML
3 INJECTION INTRAMUSCULAR; INTRAVENOUS; SUBCUTANEOUS EVERY 8 HOURS
Refills: 0 | Status: DISCONTINUED | OUTPATIENT
Start: 2020-03-09 | End: 2020-03-13

## 2020-03-09 RX ORDER — POTASSIUM CHLORIDE 20 MEQ
40 PACKET (EA) ORAL ONCE
Refills: 0 | Status: COMPLETED | OUTPATIENT
Start: 2020-03-09 | End: 2020-03-10

## 2020-03-09 RX ORDER — LIPASE/PROTEASE/AMYLASE 16-48-48K
3 CAPSULE,DELAYED RELEASE (ENTERIC COATED) ORAL
Refills: 0 | Status: DISCONTINUED | OUTPATIENT
Start: 2020-03-09 | End: 2020-03-13

## 2020-03-09 RX ORDER — AMLODIPINE BESYLATE 2.5 MG/1
10 TABLET ORAL DAILY
Refills: 0 | Status: DISCONTINUED | OUTPATIENT
Start: 2020-03-09 | End: 2020-03-13

## 2020-03-09 NOTE — ED CDU PROVIDER INITIAL DAY NOTE - PROGRESS NOTE DETAILS
CDU PROGRESS NOTE PA MARCOS: Pt resting comfortably, without complaint. aox3, speaking coherently, no focal neuro deficits. NAD, VSS. No events on telemetry.

## 2020-03-09 NOTE — ED PROVIDER NOTE - PROGRESS NOTE DETAILS
Simone PGY-2:  Signout from [facility]:   Pt to go to CDU for MRI in context of word finding difficulties

## 2020-03-09 NOTE — ED CDU PROVIDER INITIAL DAY NOTE - FAMILY HISTORY
Family history of hypertension     Family history of hyperlipidemia     Mother  Still living? Yes, Estimated age: 71-80  Family history of diabetes mellitus, Age at diagnosis: Age Unknown

## 2020-03-09 NOTE — CONSULT NOTE ADULT - ATTENDING COMMENTS
54-year-old right-handed lady first evaluated in the CDU at [facility] on 3/10/2020 with speech difficulty over about 1 week.  History and exam as above, with minor changes.  ROS otherwise negative.  Exam.  Alert and attentive; speech fluent, prosodic without paraphasias; followed crossed commands; repetition within normal limits; on manual motor testing, there was moderate-marked give way in the right leg, but with encouragement, power was probably 5/5 throughout; gait not tested; remainder of neurologic exam was nonfocal.  CT head/CTA neck and head (3/9/2020) to my eye were unremarkable.  MRI brain (3/10/2020) to my eye was unremarkable.  TTE (3/10/2020) was unremarkable.  Impression.  Beginning about 1 week prior to admission, she had some kind of speech disturbance, possibly mild hesitancy or word finding difficulty, although this is uncertain.  Since then, she has gradually improved and her neurologic exam is unremarkable.  Her presentation is consistent with possible aphasia, perhaps localizing to the left hemisphere.  Etiology is uncertain.  It is possible that she had minor cerebral ischemia of unknown mechanism, with MRI being negative.  Other possibilities include mild diffuse cerebral dysfunction as in an encephalopathy of unknown cause; doubt seizure or migraine.  For now, as a precaution will treat as if she had minor cerebral ischemia/TIA.  Suggest.  Aspirin 81 mg daily, indefinitely if there is no GI intolerance; follow-up with her neurologist, Dr. Sayda Stock; from neurovascular standpoint, cleared for discharge.

## 2020-03-09 NOTE — ED ADULT NURSE NOTE - PMH
Asthma  uses albuterol PRN , stable  Eczema    GERD (gastroesophageal reflux disease)    Hiatal hernia    HTN (hypertension)    Menorrhagia    Seasonal allergies

## 2020-03-09 NOTE — ED PROVIDER NOTE - OBJECTIVE STATEMENT
53 yo female with PMHx of HTN p/w word finding difficulty.  Patient reports that she was diagnosed with strep throat 2 weeks ago.  One week ago patient endorses word finding difficulty.  Patient states that she would think of what she wanted to say, but had trouble getting words out.  These symptoms have persisted for several days, but are slowly improving.  Patient reports that she is feeling better, but still feels her speech is not as fluent as usual. Denies fevers/chills, CP, SOB, abd pain, NVD, weakness, numbness, headache, blurry vision.

## 2020-03-09 NOTE — HISTORY OF PRESENT ILLNESS
[FreeTextEntry1] : 54 W who is here for initial consultaiton of numbness in the bottom of her feet for the past 6 years. She reveals that she had difficulty getting the words out starting on 3/3 and had the difficulty until yesterday. Currently she has no difficulty speaking. No prior episodes. She has runny nose and nasal congestion and was started on antibiotics. She states she thought the symptoms were due to the URI symptoms. She was advised that she may have had an infarct and I offered to call her ambulance but she states she is able to drive herself to the ER.

## 2020-03-09 NOTE — ED ADULT NURSE NOTE - NSSEPSISSUSPECTED_ED_A_ED
Pt BIBA from urgent care for febrile seizure that lasted 1 minute. Pt having fever x 2days. Tmax 103 No

## 2020-03-09 NOTE — ED PROVIDER NOTE - CLINICAL SUMMARY MEDICAL DECISION MAKING FREE TEXT BOX
~1 week mild aphasia, improving but still present.  Neuro intact otherwise.  VSS.  Nontoxic.  Sent by neuro for eval, likely admit v CDU for MRI.  --BMM

## 2020-03-09 NOTE — ED CDU PROVIDER INITIAL DAY NOTE - OBJECTIVE STATEMENT
55 yo female with PMHx of HTN p/w word finding difficulty.  Patient reports that she was diagnosed with strep throat 2 weeks ago.  One week ago patient endorses word finding difficulty.  Patient states that she would think of what she wanted to say, but had trouble getting words out.  These symptoms have persisted for several days, but are slowly improving.  Patient reports that she is feeling better, but still feels her speech is not as fluent as usual. Denies fevers/chills, CP, SOB, abd pain, NVD, weakness, numbness, headache, blurry vision. 55 yo female with PMHx of HTN p/w word finding difficulty.  Patient reports that she was diagnosed with strep throat 2 weeks ago.  One week ago patient endorses word finding difficulty.  Patient states that she would think of what she wanted to say, but had trouble getting words out.  These symptoms have persisted for several days, but are slowly improving.  Patient reports that she is feeling better, but still feels her speech is not as fluent as usual. Denies fevers/chills, CP, SOB, abd pain, NVD, weakness, numbness, headache, blurry vision.  In ED, patient had CT head w/o contrast, CT angio head and neck showing no signs of acute pathology. Pt was evaluated by Neurology who recommended MR head w/o contrast and TTE w/ bubble study for further evaluation to rule out acute process.

## 2020-03-09 NOTE — CONSULT NOTE ADULT - ASSESSMENT
54F w/ PMH of HTN, Pes planus of R. foot, presents w/ speech difficulties x1week -- Exam demonstrates very mild Broca type aphasia and RLE weakness.     Impression: Very subtle expressive aphasia + RLE (likely chronic) of unclear localization, possibly L. hemispheric dysfunction vs. diffuse cerebral dysfunction (given recent infection and attention difficulties). Although CTH negative, would r/o CVA (distal L. MCA vs. L. LEELA) w/ MRI brain given the subtle language disturbance and unknown chronicity of the RLE weakness.      Plan:   [] CDU  [] MRI brain w/o contrast  [] TTE w/ bubble (eval for endocarditis given recent strep throat)     Case will be discussed w/ Dr. Matthews in the AM of 3.10.2020 54F w/ PMH of HTN, Pes planus of R. foot, presents w/ speech difficulties x1week -- Exam demonstrates very mild Broca type aphasia and RLE weakness.     Impression: Very subtle expressive aphasia + RLE (Acute vs. chronic) of unclear localization, possibly L. hemispheric dysfunction vs. diffuse cerebral dysfunction (given recent infection and attention difficulties). Although CTH negative, would r/o CVA (distal L. MCA vs. L. LEELA) w/ MRI brain given the subtle language disturbance and unknown chronicity of the RLE weakness.      Plan:   [x] CTH, CTA h/n negative (to my eye)   [] Admit to CDU  [] MRI brain w/o contrast  [] TTE w/ bubble (eval for endocarditis given recent strep throat)     Case will be discussed w/ Dr. Libman in the AM of 3.10.2020

## 2020-03-09 NOTE — CONSULT NOTE ADULT - SUBJECTIVE AND OBJECTIVE BOX
HPI:    54F w/ PMH of HTN, Pes planus of R. foot, presents w/ speech difficulties x1week.     Patient states she was diagnosed with strep throat 2 weeks ago. She has been on antibiotics since. On Monday 3/3, she woke up and noticed difficulty expressing herself. States she knew what she wanted to say, but was unable to get it out. States this gradually improved over the past week and now feels nearly back to baseline. States she was having trouble concentrating during this time, but denies any difficulty understanding people.     Patient saw Dr. Emani Stock today in clinic for complaints of R. foot numbness for 6 years. As per clinic note, no speech disturbance was noted today in clinic, however Dr. Stock advsied patient to come to ED to r/o CVA.     No previous history of CVA/TIA, seizures, migraines. No history of arrythmia. No AC. No FH of stroke.     CTH 3.9.2020: Negative (to my eye)  CTA 3.9.2020: Negative (to my eye)     (Stroke only)  NIHSS: 1  MRS: 0    A 10-system ROS was performed and is negative except for those items noted above and/or in the HPI.    PAST MEDICAL & SURGICAL HISTORY:  Eczema  Seasonal allergies  Asthma: uses albuterol PRN , stable  Hiatal hernia  GERD (gastroesophageal reflux disease)  Menorrhagia  HTN (hypertension)  History of myomectomy  History of ankle surgery  History of tonsillectomy: and adenoidectomy at age 5    FAMILY HISTORY:  Family history of hyperlipidemia  Family history of hypertension  Family history of diabetes mellitus (Mother)    SOCIAL HISTORY:   T/E/D: No smoke, drink, drugs     MEDICATIONS (HOME):  Home Medications:  albuterol 90 mcg/inh inhalation powder: 2 puff(s) inhaled every 4 hours, As Needed (2020 08:12)  amLODIPine 10 mg oral tablet: 1 tab(s) orally once a day (2020 08:12)  Breo Ellipta 100 mcg-25 mcg/inh inhalation powder: 1 puff(s) inhaled once a day (2020 08:12)  Creon 36,000 units oral delayed release capsule: 1 cap(s) orally 3 times a day (2020 08:12)  escitalopram 10 mg oral tablet: 1 tab(s) orally once a day (at bedtime) (2020 08:12)  meloxicam 15 mg oral tablet: 1 tab(s) orally once a day (2020 08:12)  nystatin 100,000 units/mL oral suspension: 4 milliliter(s) orally 4 times a day  started 2019 (2020 08:12)  omeprazole 20 mg oral delayed release capsule: 1 cap(s) orally once a day (2020 08:12)  triamterene-hydrochlorothiazide 37.5 mg-25 mg oral capsule: 1 cap(s) orally once a day (2020 08:12)  Valium 5 mg oral tablet: 1 tab(s) orally 3 times a day, As Needed (2020 08:12)  Vitamin C 250 mg oral tablet, chewable: 1 tab(s) orally once a day (2020 08:12)  Vitamin D3 1000 intl units (25 mcg) oral capsule: 1 cap(s) orally once a day (2020 08:12)  ZyrTEC 10 mg oral tablet: 1 tab(s) orally once a day (2020 08:12)      Exam:   MS: Alert and oriented to self, place and time. Serial 7 up to 93. WORLD front and back. Very mild difficulty with speech initiation and repetition. Follows complex commands. No extinction on double simultaneous stimulation   CN: VFF. IVAN. EOMI. V1-V3 intact. Face symmetric. T/u midline. Normal shrug.   Motor: Normal tone. No drift. 4/5 in RLE (though patient states this is chronic due to chronic ankle problems and lower back pain). Otherwise, full strength.   Reflexes: 3+ throughout. Babinski absent bilaterally.   Sensation: Intact to LT throughout   Coordination: No dysmetria on FNF or H2S bilaterally   Gait: Normal gait     STUDIES & IMAGIN-09 Na 139   K 3.3<L>   P --   Mg --   Ca 9.1   Cr 0.59 HPI:    54F w/ PMH of HTN, Pes planus of R. foot, presents w/ speech difficulties x1week.     Patient states she was diagnosed with strep throat 2 weeks ago. She has been on antibiotics since. On Monday 3/3, she woke up and noticed difficulty expressing herself. States she knew what she wanted to say, but was unable to get it out. States this gradually improved over the past week and now feels nearly back to baseline. States she was having trouble concentrating during this time, but denies any difficulty understanding people.     Patient saw Dr. Emani Stock today in clinic for complaints of R. foot numbness for 6 years. As per clinic note, no speech disturbance was noted today in clinic, however Dr. Stock advsied patient to come to ED to r/o CVA.     No previous history of CVA/TIA, seizures, migraines. No history of arrythmia. No AC. No FH of stroke.     CTH 3.9.2020: Negative (to my eye)  CTA 3.9.2020: Negative (to my eye)     (Stroke only)  NIHSS: 1  MRS: 0    A 10-system ROS was performed and is negative except for those items noted above and/or in the HPI.    PAST MEDICAL & SURGICAL HISTORY:  Eczema  Seasonal allergies  Asthma: uses albuterol PRN , stable  Hiatal hernia  GERD (gastroesophageal reflux disease)  Menorrhagia  HTN (hypertension)  History of myomectomy  History of ankle surgery  History of tonsillectomy: and adenoidectomy at age 5    FAMILY HISTORY:  Family history of hyperlipidemia  Family history of hypertension  Family history of diabetes mellitus (Mother)    SOCIAL HISTORY:   T/E/D: No smoke, drink, drugs     MEDICATIONS (HOME):  Home Medications:  albuterol 90 mcg/inh inhalation powder: 2 puff(s) inhaled every 4 hours, As Needed (2020 08:12)  amLODIPine 10 mg oral tablet: 1 tab(s) orally once a day (2020 08:12)  Breo Ellipta 100 mcg-25 mcg/inh inhalation powder: 1 puff(s) inhaled once a day (2020 08:12)  Creon 36,000 units oral delayed release capsule: 1 cap(s) orally 3 times a day (2020 08:12)  escitalopram 10 mg oral tablet: 1 tab(s) orally once a day (at bedtime) (2020 08:12)  meloxicam 15 mg oral tablet: 1 tab(s) orally once a day (2020 08:12)  nystatin 100,000 units/mL oral suspension: 4 milliliter(s) orally 4 times a day  started 2019 (2020 08:12)  omeprazole 20 mg oral delayed release capsule: 1 cap(s) orally once a day (2020 08:12)  triamterene-hydrochlorothiazide 37.5 mg-25 mg oral capsule: 1 cap(s) orally once a day (2020 08:12)  Valium 5 mg oral tablet: 1 tab(s) orally 3 times a day, As Needed (2020 08:12)  Vitamin C 250 mg oral tablet, chewable: 1 tab(s) orally once a day (2020 08:12)  Vitamin D3 1000 intl units (25 mcg) oral capsule: 1 cap(s) orally once a day (2020 08:12)  ZyrTEC 10 mg oral tablet: 1 tab(s) orally once a day (2020 08:12)      Exam:   MS: Alert and oriented to self, place and time. Serial 7 up to 93. WORLD front and back. Very mild difficulty with speech initiation and repetition ("No Ands or IFs Buts", "They heard them speak on the radio night"). Follows complex commands. No extinction on double simultaneous stimulation   CN: VFF. IVAN. EOMI. V1-V3 intact. Face symmetric. T/u midline. Normal shrug.   Motor: Normal tone. No drift. 4/5 in RLE (though patient states this is chronic due to chronic ankle problems and lower back pain). Otherwise, full strength.   Reflexes: 3+ throughout. Babinski absent bilaterally.   Sensation: Intact to LT throughout   Coordination: No dysmetria on FNF or H2S bilaterally   Gait: Normal gait     STUDIES & IMAGIN-09 Na 139   K 3.3<L>   P --   Mg --   Ca 9.1   Cr 0.59 HPI:    54F w/ PMH of HTN, Pes planus of R. foot, presents w/ speech difficulties x1week.     Patient states she was diagnosed with strep throat 2 weeks ago. She has been on antibiotics since. On Monday 3/3, she woke up and noticed difficulty expressing herself. States she knew what she wanted to say, but was unable to get it out. States this gradually improved over the past week and now feels nearly back to baseline. States she was having trouble concentrating during this time, but denies any difficulty understanding people.     Patient saw Dr. Emani Stock today in clinic for complaints of R. foot numbness for 6 years. As per clinic note, no speech disturbance was noted today in clinic, however Dr. Stock advsied patient to come to ED to r/o CVA.     No previous history of CVA/TIA, seizures, migraines. No history of arrythmia. No AC. No FH of stroke.     CTH 3.9.2020: Negative   CTA 3.9.2020: Negative    (Stroke only)  NIHSS: 1  MRS: 0    A 10-system ROS was performed and is negative except for those items noted above and/or in the HPI.    PAST MEDICAL & SURGICAL HISTORY:  Eczema  Seasonal allergies  Asthma: uses albuterol PRN , stable  Hiatal hernia  GERD (gastroesophageal reflux disease)  Menorrhagia  HTN (hypertension)  History of myomectomy  History of ankle surgery  History of tonsillectomy: and adenoidectomy at age 5    FAMILY HISTORY:  Family history of hyperlipidemia  Family history of hypertension  Family history of diabetes mellitus (Mother)    SOCIAL HISTORY:   T/E/D: No smoke, drink, drugs     MEDICATIONS (HOME):  Home Medications:  albuterol 90 mcg/inh inhalation powder: 2 puff(s) inhaled every 4 hours, As Needed (2020 08:12)  amLODIPine 10 mg oral tablet: 1 tab(s) orally once a day (2020 08:12)  Breo Ellipta 100 mcg-25 mcg/inh inhalation powder: 1 puff(s) inhaled once a day (2020 08:12)  Creon 36,000 units oral delayed release capsule: 1 cap(s) orally 3 times a day (2020 08:12)  escitalopram 10 mg oral tablet: 1 tab(s) orally once a day (at bedtime) (2020 08:12)  meloxicam 15 mg oral tablet: 1 tab(s) orally once a day (2020 08:12)  nystatin 100,000 units/mL oral suspension: 4 milliliter(s) orally 4 times a day  started 2019 (2020 08:12)  omeprazole 20 mg oral delayed release capsule: 1 cap(s) orally once a day (2020 08:12)  triamterene-hydrochlorothiazide 37.5 mg-25 mg oral capsule: 1 cap(s) orally once a day (2020 08:12)  Valium 5 mg oral tablet: 1 tab(s) orally 3 times a day, As Needed (2020 08:12)  Vitamin C 250 mg oral tablet, chewable: 1 tab(s) orally once a day (2020 08:12)  Vitamin D3 1000 intl units (25 mcg) oral capsule: 1 cap(s) orally once a day (2020 08:12)  ZyrTEC 10 mg oral tablet: 1 tab(s) orally once a day (2020 08:12)      Exam:   MS: Alert and oriented to self, place and time. Serial 7 up to 93. WORLD front and back. Very mild difficulty with speech initiation and repetition ("No Ands or IFs Buts", "They heard them speak on the radio night"). Follows complex commands. No extinction on double simultaneous stimulation   CN: VFF. IVAN. EOMI. V1-V3 intact. Face symmetric. T/u midline. Normal shrug.   Motor: Normal tone. No drift. 4/5 in RLE (though patient states this is chronic due to chronic ankle problems and lower back pain). Otherwise, full strength.   Reflexes: 3+ throughout. Babinski absent bilaterally.   Sensation: Intact to LT throughout   Coordination: No dysmetria on FNF or H2S bilaterally   Gait: Normal gait     STUDIES & IMAGIN-09 Na 139   K 3.3<L>   P --   Mg --   Ca 9.1   Cr 0.59

## 2020-03-09 NOTE — ED PROVIDER NOTE - PHYSICAL EXAMINATION
Gen: AAO x 3, NAD  Skin: No rashes or lesions  HEENT: NC/AT, PERRLA, EOMI, MMM  Resp: unlabored CTAB  Cardiac: rrr s1s2, no murmurs, rubs or gallops  GI: ND, +BS, Soft, NT  Ext: no pedal edema, FROM in all extremities  Neuro: no focal deficits, normal speech, Strength 5/5 BUE and BLE.  Sensation intact, normal gait.

## 2020-03-09 NOTE — ED CDU PROVIDER INITIAL DAY NOTE - DETAILS
R/o CVA  1. Frequent reevaluations  2. Telemetry  3. Neuro checks  4. MRI head w/o contrast, TTE w/ bubble study   5. Neuro following  Plan d/w

## 2020-03-09 NOTE — ED ADULT NURSE NOTE - OBJECTIVE STATEMENT
55 yo female presents to ED c/o word finding difficulty x1 week. Patient reports recent dx of strep throat, finished abx. Patient states she was able to think of what she said, but had difficulty getting words out, states symptoms have had mild improvement over the past few days. Feels that speech is not normal. Patient denies SOB, CP, nvd, fever/chills, weakness, numbness, vision changes. Patient A&Ox3, breathing spontaneously, airway patent, bl clear lungs, abdomen nontender, +pulses, cap refill <2 seconds. Patient resting in bed, side rails up, plan of care explained. Cardiac monitor in place.

## 2020-03-09 NOTE — ED PROVIDER NOTE - PSH
History of ankle surgery    History of myomectomy    History of tonsillectomy  and adenoidectomy at age 5

## 2020-03-10 VITALS
DIASTOLIC BLOOD PRESSURE: 86 MMHG | HEART RATE: 75 BPM | OXYGEN SATURATION: 98 % | TEMPERATURE: 99 F | RESPIRATION RATE: 18 BRPM | SYSTOLIC BLOOD PRESSURE: 125 MMHG

## 2020-03-10 PROCEDURE — 93306 TTE W/DOPPLER COMPLETE: CPT | Mod: 26

## 2020-03-10 PROCEDURE — 85730 THROMBOPLASTIN TIME PARTIAL: CPT

## 2020-03-10 PROCEDURE — 85610 PROTHROMBIN TIME: CPT

## 2020-03-10 PROCEDURE — G0378: CPT

## 2020-03-10 PROCEDURE — 70450 CT HEAD/BRAIN W/O DYE: CPT

## 2020-03-10 PROCEDURE — 99284 EMERGENCY DEPT VISIT MOD MDM: CPT | Mod: 25

## 2020-03-10 PROCEDURE — 93306 TTE W/DOPPLER COMPLETE: CPT

## 2020-03-10 PROCEDURE — 70498 CT ANGIOGRAPHY NECK: CPT

## 2020-03-10 PROCEDURE — 70496 CT ANGIOGRAPHY HEAD: CPT

## 2020-03-10 PROCEDURE — 93005 ELECTROCARDIOGRAM TRACING: CPT

## 2020-03-10 PROCEDURE — 99217: CPT

## 2020-03-10 PROCEDURE — 70551 MRI BRAIN STEM W/O DYE: CPT | Mod: 26

## 2020-03-10 PROCEDURE — 70551 MRI BRAIN STEM W/O DYE: CPT

## 2020-03-10 PROCEDURE — 85027 COMPLETE CBC AUTOMATED: CPT

## 2020-03-10 PROCEDURE — 80053 COMPREHEN METABOLIC PANEL: CPT

## 2020-03-10 PROCEDURE — 99285 EMERGENCY DEPT VISIT HI MDM: CPT

## 2020-03-10 RX ADMIN — Medication 1 TABLET(S): at 08:50

## 2020-03-10 RX ADMIN — Medication 3 CAPSULE(S): at 13:19

## 2020-03-10 RX ADMIN — AMLODIPINE BESYLATE 10 MILLIGRAM(S): 2.5 TABLET ORAL at 08:50

## 2020-03-10 RX ADMIN — SODIUM CHLORIDE 3 MILLILITER(S): 9 INJECTION INTRAMUSCULAR; INTRAVENOUS; SUBCUTANEOUS at 13:20

## 2020-03-10 RX ADMIN — SODIUM CHLORIDE 3 MILLILITER(S): 9 INJECTION INTRAMUSCULAR; INTRAVENOUS; SUBCUTANEOUS at 09:51

## 2020-03-10 RX ADMIN — ESCITALOPRAM OXALATE 10 MILLIGRAM(S): 10 TABLET, FILM COATED ORAL at 00:17

## 2020-03-10 RX ADMIN — Medication 40 MILLIEQUIVALENT(S): at 00:17

## 2020-03-10 NOTE — ED CDU PROVIDER SUBSEQUENT DAY NOTE - HISTORY
CDU PROGRESS NOTE JOSE DAVID RODRÍGUEZ: Pt resting comfortably, aox3, speaking coherently, NAD, No events on telemetry. No focal neuro deficits, Will continue to monitor.

## 2020-03-10 NOTE — ED CDU PROVIDER DISPOSITION NOTE - PATIENT PORTAL LINK FT
You can access the FollowMyHealth Patient Portal offered by [facility] by registering at the following website: [email or web address]. By joining [facility]’s FollowMyHealth portal, you will also be able to view your health information using other applications (apps) compatible with our system.

## 2020-03-10 NOTE — ED CDU PROVIDER SUBSEQUENT DAY NOTE - ATTENDING CONTRIBUTION TO CARE
54F w/ PMH of HTN, Ashtma, Ezcema, Gerd Hiatial Hernia. Pes planus of R. foot, pw complains of diff speaking for past week. Symptoms were much worse 1 wk ago and have waxed and waned since.  Sent to cdu for neuro dias, MRI/Echo. Now symptoms "much better" PE WDWN female awake alert normocephalic atraumatic neck supple chest clear anterior & posterior abd soft +bs no mass guarding, CV no rubs, gallops or murmurs, Neruo gcs 15 speech fluent, moves all extr,   Calvin Del Rosario MD, Facep

## 2020-03-10 NOTE — ED CDU PROVIDER SUBSEQUENT DAY NOTE - PROGRESS NOTE DETAILS
CDU PROGRESS NOTE PA MARCOS: No interval change from previous note. Pt resting comfortably, aox3, speaking coherently, NAD, No events on telemetry. No focal neuro deficits CDU NOTE JOSE DAVID Mendes: pt in MRI. CDU NOTE JOSE DAVID Mendes: pt resting comfortably, feels well except still with occasional difficulty with word finding. no pain. no new symptoms. NAD VSS. neuro exam- no focal deficits, M/S intact x 4, CNII-XII grossly intact. MRI- no acute infarct, 2 tiny non-specific white matter changes- frontal region. echo normal. discussed with Neuro, will be down to see patient in the next hour. CDU NOTE JOSE DAVID Mendes: pt seen by Neuro attending- ok to d/c home on ASA 81mg daily and to f/up with her Neurologist outpt.   as per Dr. Del Rosario, pt stable for d/c home.

## 2020-03-10 NOTE — ED ADULT NURSE REASSESSMENT NOTE - NSIMPLEMENTINTERV_GEN_ALL_ED
Implemented All Universal Safety Interventions:  [place] to call system. Call bell, personal items and telephone within reach. Instruct patient to call for assistance. Room bathroom lighting operational. Non-slip footwear when patient is off stretcher. Physically safe environment: no spills, clutter or unnecessary equipment. Stretcher in lowest position, wheels locked, appropriate side rails in place.

## 2020-03-10 NOTE — ED CDU PROVIDER DISPOSITION NOTE - CLINICAL COURSE
53 yo female with PMHx of HTN p/w word finding difficulty.  Patient reports that she was diagnosed with strep throat 2 weeks ago.  One week ago patient endorses word finding difficulty.  Patient states that she would think of what she wanted to say, but had trouble getting words out.  These symptoms have persisted for several days, but are slowly improving.  Patient reports that she is feeling better, but still feels her speech is not as fluent as usual. Denies fevers/chills, CP, SOB, abd pain, NVD, weakness, numbness, headache, blurry vision.  In ED, patient had CT head w/o contrast, CT angio head and neck showing no signs of acute pathology. Pt was evaluated by Neurology who recommended MR head w/o contrast and TTE w/ bubble study for further evaluation to rule out acute process. 55 yo female with PMHx of HTN p/w word finding difficulty.  Patient reports that she was diagnosed with strep throat 2 weeks ago.  One week ago patient endorses word finding difficulty.  Patient states that she would think of what she wanted to say, but had trouble getting words out.  These symptoms have persisted for several days, but are slowly improving.  Patient reports that she is feeling better, but still feels her speech is not as fluent as usual. Denies fevers/chills, CP, SOB, abd pain, NVD, weakness, numbness, headache, blurry vision.  In ED, patient had CT head w/o contrast, CT angio head and neck showing no signs of acute pathology. Pt was evaluated by Neurology who recommended MR head w/o contrast and TTE w/ bubble study for further evaluation to rule out acute process.   pt did well, no events on tele, TTE normal. MRI- no acute/emergent findings. pt to f/up with neuro outpt, and go home on aspirin 81mg daily.

## 2020-03-10 NOTE — ED ADULT NURSE REASSESSMENT NOTE - NS ED NURSE REASSESS COMMENT FT1
15.00 Pt was reviewed by CDU MD Miguel Ángel Simpson . Pt is feeling better Pt is Discharged Ml out PA Anum Mendes explained the followup care & gave the discharge summary. Pt verbalized  the understanding on follow up care. Pt stable to go home . Pt has steady gait stable vitals A&OX 4 pain free at the time of discharge.
Pt neuro check intact, no deficits noted at this time. Will continue to monitor.
@0012 Pt received from VIVEK Cook. Pt oriented to CDU & plan of care was discussed. Pt A&O x 3. Pt in CDU for Telemetry, MRI, Neuro checks, Echocardiogram. Pt denies any dizziness, headache, lightheadedness, or weakness as of now. Pt on a cardiac monitor in NSR, HR in 70's. Pt states she still feels that she is still having trouble finding her words. Pt neuro check is intact, no deficits noted. Pt MRI checklist filled out and faxed to MRI. Pt resting in bed. Safety & comfort measures maintained. Call bell in reach. Will continue to monitor.
07.00 Am Received report from Camilla Shea. pt is observed for  Resolved Aphasia episode for MRI   Pt is A&OX 4  denies N/V/D fever chills CP SOB headache Dizziness has steady gait PEARRLA no stroke symptoms GCS 15/15 Pt speaks in clear sentences .   has stable vitals IV site looks clean & dry no signs of infiltration noted  Comfort care & safety measures  continued.  Continue to monitor

## 2020-03-10 NOTE — ED CDU PROVIDER DISPOSITION NOTE - ATTENDING CONTRIBUTION TO CARE
I have personally performed a face to face diagnostic evaluation on this patient.  I have reviewed the ACP note and agree with the history, exam, and plan of care, except as noted.  History and Exam by me shows  See CDU Sub Note  Calvin Del Rosario MD, Facep

## 2020-03-10 NOTE — ED CDU PROVIDER DISPOSITION NOTE - NSFOLLOWUPINSTRUCTIONS_ED_ALL_ED_FT
As per Neurology.............  Follow up with your Primary Care Physician within the next 2-3 days  Bring a copy of your test results with you to your appointment  Continue your current medication regimen  Return to the Emergency Room if you experience new or worsening symptoms 1. stay hydrated.  2. continue current home medications. Take Aspirin 81mg daily.   Increase potassium in your diet, your potassium was low here.   Take Ibuprofen 600mg every 6hrs for headache as needed with food.   3. Follow up with your PCP in 1 -2 days.  4. Follow up with your Neurologist at your scheduled appointment or follow up with our Neurology #[phone] in 3-5 days.  5. Bring Printed Results to your Doctor Visits. Stroke Education given to you.  6. Return if symptoms worsen, fever, weakness, numbness, dizziness, vision change, slurred speech and all other concerns

## 2020-03-16 DIAGNOSIS — Z71.89 OTHER SPECIFIED COUNSELING: ICD-10-CM

## 2020-04-23 ENCOUNTER — APPOINTMENT (OUTPATIENT)
Dept: ORTHOPEDIC SURGERY | Facility: CLINIC | Age: 55
End: 2020-04-23
Payer: MEDICARE

## 2020-04-23 DIAGNOSIS — M62.89 OTHER SPECIFIED DISORDERS OF MUSCLE: ICD-10-CM

## 2020-04-23 PROCEDURE — 99213 OFFICE O/P EST LOW 20 MIN: CPT

## 2020-04-24 PROBLEM — M62.89 TIGHTNESS OF BOTH GASTROCNEMIUS MUSCLES: Status: ACTIVE | Noted: 2020-02-25

## 2020-04-24 RX ORDER — DICLOFENAC SODIUM 10 MG/G
1 GEL TOPICAL DAILY
Qty: 1 | Refills: 1 | Status: ACTIVE | COMMUNITY
Start: 2020-04-24 | End: 1900-01-01

## 2020-04-24 NOTE — DISCUSSION/SUMMARY
[de-identified] : Options reviewed, NB shoe wear, awaiting Mayco brace, activity modification, HEP, neurology assessment.  Return to office in 2 - 3 months / PRN, all questions answered.

## 2020-04-24 NOTE — PHYSICAL EXAM
[de-identified] : Telehealth examination patient with PPAV R foot, references numbness specifically of toes R forefoot.

## 2020-04-24 NOTE — HISTORY OF PRESENT ILLNESS
[Home] : at home, [unfilled] , at the time of the visit. [Other Location: e.g. Home (Enter Location, City,State)___] : at [unfilled] [Self] : self [Patient] : the patient [FreeTextEntry1] : Follow-up visit P pes planus / PTTD and numbness R foot principally forefoot / toes, awaiting Mayco brace custom orthotic as well as completion of neurology assessment.  Wearing regular shoes for ambulation.

## 2020-05-14 ENCOUNTER — APPOINTMENT (OUTPATIENT)
Dept: NEUROLOGY | Facility: CLINIC | Age: 55
End: 2020-05-14
Payer: MEDICARE

## 2020-05-14 PROCEDURE — 99215 OFFICE O/P EST HI 40 MIN: CPT | Mod: 95

## 2020-05-14 PROCEDURE — 99205 OFFICE O/P NEW HI 60 MIN: CPT | Mod: 95

## 2020-05-14 NOTE — PHYSICAL EXAM
[Person] : oriented to person [Place] : oriented to place [Time] : oriented to time [Short Term Intact] : short term memory intact [Fluency] : fluency intact [Current Events] : adequate knowledge of current events [Cranial Nerves Oculomotor (III)] : extraocular motion intact [Cranial Nerves Facial (VII)] : face symmetrical [Cranial Nerves Vestibulocochlear (VIII)] : hearing was intact bilaterally [Cranial Nerves Accessory (XI - Cranial And Spinal)] : head turning and shoulder shrug symmetric [Cranial Nerves Hypoglossal (XII)] : there was no tongue deviation with protrusion [Paresis Pronator Drift Right-Sided] : no pronator drift on the right [Paresis Pronator Drift Left-Sided] : no pronator drift on the left [Romberg's Sign] : Romberg's sign was negtive [Abnormal Walk] : normal gait [Coordination - Dysmetria Impaired Finger-to-Nose Bilateral] : not present [FreeTextEntry4] : slowness in speech noted at times.  [FreeTextEntry7] : decreased to lt to the bottom of feet to the calves bilaterally.

## 2020-05-14 NOTE — HISTORY OF PRESENT ILLNESS
[FreeTextEntry1] : verbal consent given on 05/14/2020 and 13:29  by ROSALIO ELIZONDO at [facility] [address] [place] [address] [address]\[place] [address]\par \par 54 W who is here for initial visit of the numbness in her feet for the past 6 years. She was last seen on 3/9 however, she was sent to ED due to new onset of word finding difficulties. [place] records were reviewed. MRI brain showed no acute infarct, cta of h and n showed no major stenosis. Her word finding difficulty has improved. She feels that she thinks faster than she can talk. She states she's had these symptoms for over a year. She describes numbness in the bottom of her feet. It's alleviated by elevating her legs. She has back pain and the numbness is worse when she sits for a long period of time. \par \par CONSENT FOR VIDEO MEDICAL VISIT1. I understand that my physician wishes me to engage in a telemedicine consultation.2. My physician has explained to me how the video conferencing technology will be used to affect such a consultation will not be the same as a direct patient/health care provider visit due to the fact that I will not be in the same room as my physician 3. I understand there are potential risks to this technology, including interruptions, unauthorized access and technical difficulties. I understand that my health care provider or I can discontinue the telemedicine consult/visit if it is felt that the videoconferencing connections are not adequate for the situation.4. I understand that my healthcare information may be shared with other individuals for scheduling and billing purposes. Others may also be present during the consultation other than my physician and consulting health care provider in order to operate the video equipment. The above mentioned people will all maintain confidentiality of the information obtained. I further understand that I will be informed of their presence in the consultation and thus will have the right to request the following: (1) omit specific details of my medical history/physical examination that are personally sensitive to me; (2) ask non-medical personnel to leave the telemedicine examination room: and or (3) terminate the consultation at any time.5. I have had the alternatives to a telemedicine consultation explained to me, and in choosing to participate in a telemedicine consultation. I understand that some parts of the exam involving physical tests may be conducted by individuals at my location at the direction of the consulting health care provider.6. In an emergent consultation, I understand that the responsibility of the telemedicine consulting specialist is to advise my local practitioner and that the specialist’s responsibility will conclude upon the termination of the video conference connection.7. I understand that billing will occur from both my physician and as a facility fee from the site from which I am presented.8. I have had a direct conversation with my physician, during which I had the opportunity to ask questions in regard to this procedure. My questions have been answered and the risks, benefits and any practical alternatives have been discussed with me in a language in which I understand\par

## 2020-05-14 NOTE — DISCUSSION/SUMMARY
[FreeTextEntry1] : 54 W who is here for initial visit of numbness for the past 6 years in the bottom of her feet to the calves. Will check neuropathy labs for possible neuropathy. Will also check MRI of l spine for s1 radiculopathy due to back pain. She was asked to bring copies of her previous mri of l spine to my office for comparison. She was also advised to see a vascular specialist for possible peripheral vascular disease given the history of alleviation when she elevates her feet. She will fu after the study is completed. Will address cause of her word finding difficulties in future visits. She states it has been going on for more than a year. However, during her last visit, it was sudden onset. I asked her to have family present for more history to help shed light on this issue. \par \par physician location: home\par patient location: home\par \par I spent 60 minutes of total time, during which more than 50% of the time was spent on counseling. I explained the diagnosis, other possible diagnoses, workup, and management, as well as answered any questions.\par \par This is a telemedicine visit that was performed using real time 2-way audiovisual technology. Verbal consent to participate in video visit was obtained and patient was aware of their right to refuse to participate in services delivered via telemedicine and the alternative and potential limitations of participating in a telemedicine visit vs a face-to-face visit; I have also informed the patient of my current location in the [facility] and the names of all persons participating in the telemedicine service and their role in the encounter. The patient agrees to have this service via Telehealth.\par \par  This visit occurred during the Coronavirus (COVID-19) Public Health Emergency. I discussed with the patient the nature of our telemedicine visits, that: \par • I would evaluate the patient and recommend diagnostics and treatments based on my assessment \par • Our sessions are not being recorded and that personal health information is protected \par • Our team would provide follow up care in person if/when the patient needs it.\par

## 2021-06-01 NOTE — ASU DISCHARGE PLAN (ADULT/PEDIATRIC) - MEDICATION INSTRUCTIONS
Left voicemail for patient to complete Travel Screen for Cardiac Cath Lab appointment on 6/3/2021 and inform patient of updated Visitor Policy.         
Take tylenol for mild to moderate pain.

## 2021-06-10 ENCOUNTER — APPOINTMENT (OUTPATIENT)
Dept: ORTHOPEDIC SURGERY | Facility: CLINIC | Age: 56
End: 2021-06-10
Payer: MEDICARE

## 2021-06-10 PROCEDURE — 99213 OFFICE O/P EST LOW 20 MIN: CPT

## 2021-06-10 RX ORDER — DICLOFENAC SODIUM 1% 10 MG/G
1 GEL TOPICAL
Qty: 1 | Refills: 1 | Status: ACTIVE | COMMUNITY
Start: 2021-06-10 | End: 1900-01-01

## 2021-06-11 NOTE — DISCUSSION/SUMMARY
[de-identified] : Options reviewed, NB shoe wear, activity modification, Mayco brace custom orthotic, physical therapy / rehabilitation and associated modalities, HEP.  Return to office in 2 - 3 months / PRN, all questions answered.

## 2021-06-11 NOTE — HISTORY OF PRESENT ILLNESS
[FreeTextEntry1] : Ms. ROSALIO ELIZONDO  is a 55 year old female who presents to the office for a follow-up visit.  She has persistent right lateral proximal foot pain.  Her foot is very sensitive and painful to the touch or if it gets jolted.  She also feels her foot is stiff.  She never got her custom Mayco brace secondary to the pandemic. \par

## 2021-06-11 NOTE — PHYSICAL EXAM
[Normal] : Oriented to person, place, and time, insight and judgement were intact and the affect was normal [de-identified] : Extremity: +Equinus B LE, +PPAV B foot, residual weakness with R SLHR testing, incision well healed lateral aspect R hindfoot, jog ST ROM right hindfoot, mild tenderness lateral ST joint R hindfoot. Right Achilles tendon intact, Mancia testing normal R LE. Nontender R peroneals, syndesmosis, medial malleolus, Deltoid, midfoot LF and PTT insertional, and forefoot / base 5th metatarsal. Calves soft and nontender, hypoesthesias dorsolateral R foot. AOx3, mood / affect normal.  [de-identified] : MIRANDA, NAD

## 2021-07-26 ENCOUNTER — APPOINTMENT (OUTPATIENT)
Dept: NEUROLOGY | Facility: CLINIC | Age: 56
End: 2021-07-26

## 2022-02-01 ENCOUNTER — NON-APPOINTMENT (OUTPATIENT)
Age: 57
End: 2022-02-01

## 2022-02-10 ENCOUNTER — APPOINTMENT (OUTPATIENT)
Dept: ORTHOPEDIC SURGERY | Facility: CLINIC | Age: 57
End: 2022-02-10
Payer: MEDICARE

## 2022-02-10 VITALS — WEIGHT: 175 LBS | HEIGHT: 64 IN | BODY MASS INDEX: 29.88 KG/M2

## 2022-02-10 PROCEDURE — 99213 OFFICE O/P EST LOW 20 MIN: CPT

## 2022-05-31 ENCOUNTER — APPOINTMENT (OUTPATIENT)
Dept: NEUROLOGY | Facility: CLINIC | Age: 57
End: 2022-05-31
Payer: MEDICARE

## 2022-05-31 PROCEDURE — 95910 NRV CNDJ TEST 7-8 STUDIES: CPT

## 2022-05-31 PROCEDURE — 95886 MUSC TEST DONE W/N TEST COMP: CPT

## 2022-07-05 NOTE — H&P PST ADULT - HISTORY OF PRESENT ILLNESS
This is a 53 y/o female c/o abdominal pain , sonogram revealed + calculus of gallbladder , she presents today  for laparoscopic cholecystectomy 48 This is a 55 y/o female c/o abdominal pain , sonogram revealed + calculus of gallbladder , she presents today  for laparoscopic cholecystectomy.  *****pt on nystatin day 7 for oral  thrush, today exam found oral thrush has resolved *****

## 2022-07-12 ENCOUNTER — APPOINTMENT (OUTPATIENT)
Dept: ORTHOPEDIC SURGERY | Facility: CLINIC | Age: 57
End: 2022-07-12

## 2022-07-12 ENCOUNTER — NON-APPOINTMENT (OUTPATIENT)
Age: 57
End: 2022-07-12

## 2022-09-13 ENCOUNTER — TRANSCRIPTION ENCOUNTER (OUTPATIENT)
Age: 57
End: 2022-09-13

## 2022-09-13 ENCOUNTER — APPOINTMENT (OUTPATIENT)
Dept: NEUROLOGY | Facility: CLINIC | Age: 57
End: 2022-09-13

## 2022-09-13 VITALS
DIASTOLIC BLOOD PRESSURE: 98 MMHG | BODY MASS INDEX: 32.1 KG/M2 | SYSTOLIC BLOOD PRESSURE: 144 MMHG | WEIGHT: 188 LBS | HEART RATE: 88 BPM | HEIGHT: 64 IN

## 2022-09-13 PROCEDURE — 99215 OFFICE O/P EST HI 40 MIN: CPT

## 2022-09-13 NOTE — PHYSICAL EXAM
[General Appearance - Alert] : alert [Oriented To Time, Place, And Person] : oriented to person, place, and time [Person] : oriented to person [Place] : oriented to place [Time] : oriented to time [Short Term Intact] : short term memory intact [Fluency] : fluency intact [Current Events] : adequate knowledge of current events [Cranial Nerves Optic (II)] : visual acuity intact bilaterally,  visual fields full to confrontation, pupils equal round and reactive to light [Cranial Nerves Oculomotor (III)] : extraocular motion intact [Cranial Nerves Vestibulocochlear (VIII)] : hearing was intact bilaterally [Cranial Nerves Accessory (XI - Cranial And Spinal)] : head turning and shoulder shrug symmetric [Motor Tone] : muscle tone was normal in all four extremities [Motor Strength] : muscle strength was normal in all four extremities [Paresis Pronator Drift Right-Sided] : no pronator drift on the right [Paresis Pronator Drift Left-Sided] : no pronator drift on the left [Romberg's Sign] : Romberg's sign was negtive [Abnormal Walk] : normal gait [Coordination - Dysmetria Impaired Finger-to-Nose Bilateral] : not present [1+] : Patella left 1+ [FreeTextEntry4] : slowness in speech noted at times.  [FreeTextEntry6] : needed arm assistance when standing from squat position. not able to heel or toe walk because of previous ankle surgery [FreeTextEntry7] : decreased to lt to the bottom of feet to the calves bilaterally. This is the same as the exam in 2020

## 2022-09-13 NOTE — HISTORY OF PRESENT ILLNESS
[FreeTextEntry1] : 56 W who is here for initial visit of the numbness in her feet for the past 6 years. She was last seen on 3/9 however, she was sent to ED due to new onset of word finding difficulties. [place] records were reviewed. MRI brain showed no acute infarct, cta of h and n showed no major stenosis. Her word finding difficulty has improved. She feels that she thinks faster than she can talk. She states she's had these symptoms for over a year. She describes numbness in the bottom of her feet. It's alleviated by elevating her legs. She has back pain and the numbness is worse when she sits for a long period of time. \par \par Interval history: Patient was over an hour late because of family emergency.\par \par Patient was initially seen in March 2020 for numbness however patient had acute onset of word finding difficulty and was sent to the ER where MRI of the brain was negative for stroke and CT angio of the head and neck were within normal limits.\par \par Patient was last seen in May 2020 for similar symptoms.  Patient has numbness in her feet for the past 8 years and patient was advised to obtain neuropathy blood work, MRI of the lumbar spine and a vascular consult at the time but patient did not do any of the studies.\par \par Patient saw Dr. Henning in the interim and Dr. Henning referred patient to Dr. Curtis for nerve conduction and EMG studies UA for right compressive neuropathy.  EMG was performed in May 2022 which was unremarkable.

## 2022-09-13 NOTE — DISCUSSION/SUMMARY
[FreeTextEntry1] : 56-year-old woman who is here for follow-up of her bilateral leg numbness to the level of her calves bilaterally.  Patient has been seen previously for word finding difficulties with negative MRI of the brain and CT angio of the head and neck that were unremarkable.  Will address the word finding difficulties and upcoming appointment.  Patient wanted to focus on the numbness in her legs bilaterally.  Will check neuropathy blood work once again and MRI of the lumbar spine for any radiculopathy in the L5-S1 region bilaterally.  Patient is once again referred to a vascular specialist for possible peripheral vascular disease given the history of alleviation of the numbness when she elevates her feet.  This may suggest slight pedal edema?  Patient will follow up with me after the studies are completed.\par \par I spent the time noted on the day of this patient encounter preparing for, providing and documenting the above E/M service and counseling and educate patient on differential, workup, disease course, and treatment/management. Education was provided to the patient during this encounter. All questions and concerns were answered and addressed in detail. The patient verbalized understanding and agreed to plan. Patient was advised to continue to monitor for neurologic symptoms and to notify my office or go to the nearest emergency room if there are any changes. Any orders/referrals and communications were provided as well. \par Side effects of the above medications were discussed in detail including but not limited to applicable black box warning and teratogenicity as appropriate. \par Patient was advised to bring previous records to my office. \par \par \par

## 2022-09-24 ENCOUNTER — OUTPATIENT (OUTPATIENT)
Dept: OUTPATIENT SERVICES | Facility: HOSPITAL | Age: 57
LOS: 1 days | End: 2022-09-24
Payer: MEDICARE

## 2022-09-24 ENCOUNTER — APPOINTMENT (OUTPATIENT)
Dept: MRI IMAGING | Facility: CLINIC | Age: 57
End: 2022-09-24

## 2022-09-24 DIAGNOSIS — Z98.890 OTHER SPECIFIED POSTPROCEDURAL STATES: Chronic | ICD-10-CM

## 2022-09-24 DIAGNOSIS — R20.0 ANESTHESIA OF SKIN: ICD-10-CM

## 2022-09-24 DIAGNOSIS — Z98.89 OTHER SPECIFIED POSTPROCEDURAL STATES: Chronic | ICD-10-CM

## 2022-09-24 PROCEDURE — 72148 MRI LUMBAR SPINE W/O DYE: CPT

## 2022-09-24 PROCEDURE — 72148 MRI LUMBAR SPINE W/O DYE: CPT | Mod: 26,MH

## 2022-09-29 ENCOUNTER — NON-APPOINTMENT (OUTPATIENT)
Age: 57
End: 2022-09-29

## 2022-10-11 ENCOUNTER — APPOINTMENT (OUTPATIENT)
Dept: ORTHOPEDIC SURGERY | Facility: CLINIC | Age: 57
End: 2022-10-11
Payer: MEDICARE

## 2022-10-11 ENCOUNTER — NON-APPOINTMENT (OUTPATIENT)
Age: 57
End: 2022-10-11

## 2022-10-11 VITALS — WEIGHT: 188 LBS | BODY MASS INDEX: 32.1 KG/M2 | HEIGHT: 64 IN

## 2022-10-11 DIAGNOSIS — M21.6X1 OTHER ACQUIRED DEFORMITIES OF RIGHT FOOT: ICD-10-CM

## 2022-10-11 DIAGNOSIS — Z98.890 OTHER SPECIFIED POSTPROCEDURAL STATES: ICD-10-CM

## 2022-10-11 DIAGNOSIS — M21.41 FLAT FOOT [PES PLANUS] (ACQUIRED), RIGHT FOOT: ICD-10-CM

## 2022-10-11 DIAGNOSIS — M76.821 POSTERIOR TIBIAL TENDINITIS, RIGHT LEG: ICD-10-CM

## 2022-10-11 PROCEDURE — 99213 OFFICE O/P EST LOW 20 MIN: CPT

## 2022-10-11 RX ORDER — DICLOFENAC SODIUM 1% 10 MG/G
1 GEL TOPICAL
Qty: 1 | Refills: 2 | Status: ACTIVE | COMMUNITY
Start: 2022-10-11 | End: 1900-01-01

## 2022-10-23 ENCOUNTER — APPOINTMENT (OUTPATIENT)
Dept: MRI IMAGING | Facility: CLINIC | Age: 57
End: 2022-10-23
Payer: MEDICARE

## 2022-10-23 ENCOUNTER — OUTPATIENT (OUTPATIENT)
Dept: OUTPATIENT SERVICES | Facility: HOSPITAL | Age: 57
LOS: 1 days | End: 2022-10-23
Payer: MEDICARE

## 2022-10-23 DIAGNOSIS — M21.6X1 OTHER ACQUIRED DEFORMITIES OF RIGHT FOOT: ICD-10-CM

## 2022-10-23 DIAGNOSIS — Z98.890 OTHER SPECIFIED POSTPROCEDURAL STATES: Chronic | ICD-10-CM

## 2022-10-23 DIAGNOSIS — M62.89 OTHER SPECIFIED DISORDERS OF MUSCLE: ICD-10-CM

## 2022-10-23 DIAGNOSIS — M21.41 FLAT FOOT [PES PLANUS] (ACQUIRED), RIGHT FOOT: ICD-10-CM

## 2022-10-23 DIAGNOSIS — Z98.89 OTHER SPECIFIED POSTPROCEDURAL STATES: Chronic | ICD-10-CM

## 2022-10-23 DIAGNOSIS — M24.9 JOINT DERANGEMENT, UNSPECIFIED: ICD-10-CM

## 2022-10-23 DIAGNOSIS — M67.88 OTHER SPECIFIED DISORDERS OF SYNOVIUM AND TENDON, OTHER SITE: ICD-10-CM

## 2022-10-23 PROCEDURE — 73721 MRI JNT OF LWR EXTRE W/O DYE: CPT | Mod: 26,RT,MH

## 2022-10-23 PROCEDURE — 73721 MRI JNT OF LWR EXTRE W/O DYE: CPT

## 2023-01-03 ENCOUNTER — APPOINTMENT (OUTPATIENT)
Dept: VASCULAR SURGERY | Facility: CLINIC | Age: 58
End: 2023-01-03
Payer: COMMERCIAL

## 2023-01-03 VITALS
SYSTOLIC BLOOD PRESSURE: 152 MMHG | DIASTOLIC BLOOD PRESSURE: 100 MMHG | BODY MASS INDEX: 32.1 KG/M2 | WEIGHT: 188 LBS | HEIGHT: 64 IN

## 2023-01-03 VITALS — DIASTOLIC BLOOD PRESSURE: 95 MMHG | SYSTOLIC BLOOD PRESSURE: 151 MMHG | HEART RATE: 88 BPM

## 2023-01-03 PROCEDURE — 99203 OFFICE O/P NEW LOW 30 MIN: CPT

## 2023-01-03 PROCEDURE — 93970 EXTREMITY STUDY: CPT

## 2023-01-03 NOTE — ASSESSMENT
[FreeTextEntry1] : In the office today patient underwent a venous duplex study which shows no significant venous insufficiency bilaterally.  There are scattered varicosities.  Patient also with a normal lower extremity pulse exam.  I do not believe the numbness in her feet is vascular in nature.  She may return to the office as needed.

## 2023-01-03 NOTE — HISTORY OF PRESENT ILLNESS
[FreeTextEntry1] : 57-year-old female who presents to the office complaining of numbness in her feet.\par Patient states she is able to ambulate fairly well.  She has a history of varicose veins that are small bilaterally.  She states that she was sent to the office today by the physical therapist.

## 2023-01-03 NOTE — PHYSICAL EXAM
[JVD] : no jugular venous distention  [Normal Breath Sounds] : Normal breath sounds [Normal Rate and Rhythm] : normal rate and rhythm [2+] : left 2+ [Ankle Swelling (On Exam)] : not present [Varicose Veins Of Lower Extremities] : bilaterally [Ankle Swelling On The Right] : mild [] : not present [Abdomen Tenderness] : ~T ~M No abdominal tenderness [Alert] : alert [Calm] : calm [de-identified] : Appears well

## 2023-01-10 ENCOUNTER — APPOINTMENT (OUTPATIENT)
Dept: OPHTHALMOLOGY | Facility: CLINIC | Age: 58
End: 2023-01-10

## 2023-01-13 ENCOUNTER — APPOINTMENT (OUTPATIENT)
Dept: NEUROLOGY | Facility: CLINIC | Age: 58
End: 2023-01-13
Payer: COMMERCIAL

## 2023-01-13 VITALS — DIASTOLIC BLOOD PRESSURE: 97 MMHG | HEART RATE: 85 BPM | RESPIRATION RATE: 17 BRPM | SYSTOLIC BLOOD PRESSURE: 148 MMHG

## 2023-01-13 VITALS — DIASTOLIC BLOOD PRESSURE: 94 MMHG | RESPIRATION RATE: 17 BRPM | HEART RATE: 81 BPM | SYSTOLIC BLOOD PRESSURE: 143 MMHG

## 2023-01-13 VITALS — SYSTOLIC BLOOD PRESSURE: 143 MMHG | DIASTOLIC BLOOD PRESSURE: 93 MMHG | HEART RATE: 84 BPM | RESPIRATION RATE: 17 BRPM

## 2023-01-13 DIAGNOSIS — R20.0 ANESTHESIA OF SKIN: ICD-10-CM

## 2023-01-13 PROCEDURE — 99215 OFFICE O/P EST HI 40 MIN: CPT

## 2023-01-13 NOTE — DISCUSSION/SUMMARY
[FreeTextEntry1] : 57-year-old woman who is here for follow-up of her bilateral leg numbness to the level of her calves bilaterally.  Work-up has been negative in terms of nerve conduction and EMG studies and MRI of the lumbar spine shows no significant findings.  We will see what the neuropathy labs show.  Patient's word finding difficulties may be due to chronic infarct versus hypoperfusion versus early signs of primary progressive aphasia.  Patient denies any memory loss to support the last differential diagnosis.  Will obtain repeat MRI of the brain to evaluate for any infarcts versus atrophy to suggest primary progressive aphasia.  Patient had recently stopped aspirin 81 mg because of bleeding.  Patient was asked to follow-up with her PMD for further evaluation.  Patient will follow-up with me after the studies are completed\par \par I spent the time noted on the day of this patient encounter preparing for, providing and documenting the above E/M service and counseling and educate patient on differential, workup, disease course, and treatment/management. Education was provided to the patient during this encounter. All questions and concerns were answered and addressed in detail. The patient verbalized understanding and agreed to plan. Patient was advised to continue to monitor for neurologic symptoms and to notify my office or go to the nearest emergency room if there are any changes. Any orders/referrals and communications were provided as well. \par Side effects of the above medications were discussed in detail including but not limited to applicable black box warning and teratogenicity as appropriate. \par Patient was advised to bring previous records to my office. \par \par \par

## 2023-01-13 NOTE — HISTORY OF PRESENT ILLNESS
[FreeTextEntry1] : 57 W who is here for initial visit of the numbness in her feet for the past 6 years. She was last seen on 3/9 however, she was sent to ED due to new onset of word finding difficulties. [place] records were reviewed. MRI brain showed no acute infarct, cta of h and n showed no major stenosis. Her word finding difficulty has improved. She feels that she thinks faster than she can talk. She states she's had these symptoms for over a year. She describes numbness in the bottom of her feet. It's alleviated by elevating her legs. She has back pain and the numbness is worse when she sits for a long period of time. \par \par Interval history: Patient was over an hour late because of family emergency.\par \par Patient was initially seen in March 2020 for numbness however patient had acute onset of word finding difficulty and was sent to the ER where MRI of the brain was negative for stroke and CT angio of the head and neck were within normal limits.\par \par Patient was last seen in May 2020 for similar symptoms.  Patient has numbness in her feet for the past 8 years and patient was advised to obtain neuropathy blood work, MRI of the lumbar spine and a vascular consult at the time but patient did not do any of the studies.\par \par Patient saw Dr. Henning in the interim and Dr. Henning referred patient to Dr. Curtis for nerve conduction and EMG studies UA for right compressive neuropathy.  EMG was performed in May 2022 which was unremarkable\par \par .Interval history January 13, 2023: Since her last visit patient saw a vascular specialist who did not think her leg symptoms were due to vascular insufficiency.  Patient's MRI of the lumbar spine shows moderate multilevel spondylosis from L3-S1.  Patient also had unremarkable nerve conduction and EMG studies.  Patient was sent for neuropathy blood work however patient states that she just had it done today.  Patient states that her cholesterol level is fine as per her PMD.  Patient had stopped the aspirin because she kept on having blood from her uterus and bowel movements and urine.  Patient was asked to follow-up with her PMD Dr. Uriostegui for further work-up. \par \par We focused on her word finding difficulties that are intermittent.  Patient denies any memory loss.

## 2023-01-17 ENCOUNTER — NON-APPOINTMENT (OUTPATIENT)
Age: 58
End: 2023-01-17

## 2023-01-17 LAB
25(OH)D3 SERPL-MCNC: 30.5 NG/ML
AFP-TM SERPL-MCNC: 3.1 NG/ML
ALBUMIN SERPL ELPH-MCNC: 4.7 G/DL
ALP BLD-CCNC: 180 U/L
ALT SERPL-CCNC: 136 U/L
ANION GAP SERPL CALC-SCNC: 16 MMOL/L
AST SERPL-CCNC: 102 U/L
B2 MICROGLOB 24H UR-MCNC: 29 UG/L
BASOPHILS # BLD AUTO: 0.09 K/UL
BASOPHILS NFR BLD AUTO: 0.8 %
BILIRUB SERPL-MCNC: 0.5 MG/DL
BUN SERPL-MCNC: 12 MG/DL
CALCIUM SERPL-MCNC: 9.3 MG/DL
CHLORIDE SERPL-SCNC: 95 MMOL/L
CO2 SERPL-SCNC: 25 MMOL/L
CREAT SERPL-MCNC: 0.68 MG/DL
CRYOGLOB SERPL-MCNC: NEGATIVE
DEPRECATED KAPPA LC FREE/LAMBDA SER: 1.36 RATIO
EGFR: 102 ML/MIN/1.73M2
EOSINOPHIL # BLD AUTO: 0.13 K/UL
EOSINOPHIL NFR BLD AUTO: 1.2 %
ERYTHROCYTE [SEDIMENTATION RATE] IN BLOOD BY WESTERGREN METHOD: 57 MM/HR
FOLATE SERPL-MCNC: >20 NG/ML
GLIADIN IGA SER QL: 7.2 UNITS
GLIADIN IGG SER QL: <5 UNITS
GLIADIN PEPTIDE IGA SER-ACNC: NEGATIVE
GLIADIN PEPTIDE IGG SER-ACNC: NEGATIVE
GLUCOSE SERPL-MCNC: 107 MG/DL
HBV CORE IGG+IGM SER QL: NONREACTIVE
HBV SURFACE AB SER QL: NONREACTIVE
HBV SURFACE AG SER QL: NONREACTIVE
HCT VFR BLD CALC: 40.3 %
HCV AB SER QL: NONREACTIVE
HCV S/CO RATIO: 0.1 S/CO
HGB BLD-MCNC: 13.1 G/DL
IMM GRANULOCYTES NFR BLD AUTO: 0.4 %
KAPPA LC CSF-MCNC: 1.73 MG/DL
KAPPA LC SERPL-MCNC: 2.36 MG/DL
LDH SERPL-CCNC: 247 U/L
LEAD BLD-MCNC: <1 UG/DL
LYMPHOCYTES # BLD AUTO: 2.72 K/UL
LYMPHOCYTES NFR BLD AUTO: 24.4 %
MAN DIFF?: NORMAL
MCHC RBC-ENTMCNC: 28.9 PG
MCHC RBC-ENTMCNC: 32.5 GM/DL
MCV RBC AUTO: 88.8 FL
MERCURY BLD-MCNC: <1 UG/L
MONOCYTES # BLD AUTO: 0.91 K/UL
MONOCYTES NFR BLD AUTO: 8.2 %
NEUTROPHILS # BLD AUTO: 7.26 K/UL
NEUTROPHILS NFR BLD AUTO: 65 %
PLATELET # BLD AUTO: 417 K/UL
POTASSIUM SERPL-SCNC: 3.3 MMOL/L
PROT SERPL-MCNC: 7.5 G/DL
RBC # BLD: 4.54 M/UL
RBC # FLD: 14.7 %
SODIUM SERPL-SCNC: 135 MMOL/L
T PALLIDUM AB SER QL IA: NEGATIVE
T4 SERPL-MCNC: 9.5 UG/DL
TSH SERPL-ACNC: 2.2 UIU/ML
VIT B12 SERPL-MCNC: 1171 PG/ML
WBC # FLD AUTO: 11.15 K/UL

## 2023-01-18 LAB
ALBUMIN MFR SERPL ELPH: 54.6 %
ALBUMIN SERPL-MCNC: 4.1 G/DL
ALBUMIN/GLOB SERPL: 1.2 RATIO
ALPHA1 GLOB MFR SERPL ELPH: 4.6 %
ALPHA1 GLOB SERPL ELPH-MCNC: 0.3 G/DL
ALPHA2 GLOB MFR SERPL ELPH: 11.8 %
ALPHA2 GLOB SERPL ELPH-MCNC: 0.9 G/DL
B-GLOBULIN MFR SERPL ELPH: 15.1 %
B-GLOBULIN SERPL ELPH-MCNC: 1.1 G/DL
COPPER SERPL-MCNC: 126 UG/DL
GAMMA GLOB FLD ELPH-MCNC: 1 G/DL
GAMMA GLOB MFR SERPL ELPH: 13.9 %
INTERPRETATION SERPL IEP-IMP: NORMAL
M PROTEIN SPEC IFE-MCNC: NORMAL
PROT SERPL-MCNC: 7.5 G/DL
PROT SERPL-MCNC: 7.5 G/DL
VGCC-P/Q BIND AB SER-SCNC: 0 PMOL/L
VIT B6 SERPL-MCNC: 5.1 UG/L
ZINC SERPL-MCNC: 79 UG/DL

## 2023-01-20 LAB
A-TOCOPHEROL VIT E SERPL-MCNC: 18.9 MG/L
ALBUPE: 33.8 %
ALPHA1UPE: 28 %
ALPHA2UPE: 18.2 %
ARSENIC, BLOOD: <10 NG/ML
BETA+GAMMA TOCOPHEROL SERPL-MCNC: 0.2 MG/L
BETAUPE: 10 %
CREAT 24H UR-MCNC: NORMAL G/24 H
CREATININE UR (MAYO): 25 MG/DL
GAMMAUPE: 10 %
IGA 24H UR QL IFE: NORMAL
KAPPA LC 24H UR QL: NORMAL
PROT PATTERN 24H UR ELPH-IMP: NORMAL
PROT UR-MCNC: 5 MG/DL
PROT UR-MCNC: 5 MG/DL
SPECIMEN VOL 24H UR: NORMAL ML
SULFATIDE AB SER QL: NORMAL
SULFATIDE ANTIBODIES COMMENTS: NORMAL
SULFATIDE ANTIBODIES METHODS: NORMAL
SULFATIDE ANTIBODIES REFERENCES: NORMAL
SULFATIDE ANTIBODIES TECHNICAL RESULTS: NORMAL

## 2023-01-23 LAB
GQ1B AB IGG: NORMAL TITER
MAG AB SER QL: NEGATIVE

## 2023-01-25 LAB
HU AB SER QL: NORMAL
PURKINJE CELLS AB SER QL IF: NORMAL

## 2023-02-04 ENCOUNTER — APPOINTMENT (OUTPATIENT)
Dept: MRI IMAGING | Facility: CLINIC | Age: 58
End: 2023-02-04

## 2023-04-24 ENCOUNTER — APPOINTMENT (OUTPATIENT)
Dept: NEUROLOGY | Facility: CLINIC | Age: 58
End: 2023-04-24
Payer: COMMERCIAL

## 2023-04-24 DIAGNOSIS — F32.A DEPRESSION, UNSPECIFIED: ICD-10-CM

## 2023-04-24 DIAGNOSIS — R47.89 OTHER SPEECH DISTURBANCES: ICD-10-CM

## 2023-04-24 PROCEDURE — 99215 OFFICE O/P EST HI 40 MIN: CPT | Mod: 95

## 2023-04-24 NOTE — PHYSICAL EXAM
[Person] : oriented to person [Place] : oriented to place [Time] : oriented to time [Short Term Intact] : short term memory intact [Fluency] : fluency intact [Current Events] : adequate knowledge of current events [Cranial Nerves Oculomotor (III)] : extraocular motion intact [Cranial Nerves Facial (VII)] : face symmetrical [Cranial Nerves Vestibulocochlear (VIII)] : hearing was intact bilaterally [Cranial Nerves Accessory (XI - Cranial And Spinal)] : head turning and shoulder shrug symmetric [Paresis Pronator Drift Right-Sided] : no pronator drift on the right [Paresis Pronator Drift Left-Sided] : no pronator drift on the left [Romberg's Sign] : Romberg's sign was negtive [FreeTextEntry4] : slowness in speech noted at times.  [FreeTextEntry6] : needed arm assistance when standing from squat position. not able to heel or toe walk because of previous ankle surgery

## 2023-04-24 NOTE — HISTORY OF PRESENT ILLNESS
[FreeTextEntry1] : verbal consent given on 04/24/2023 and 15:56  by ROSALIO ELIZONDO at [facility] [address] [place] [address] 10\[place] [address]\par \par \par 57 W who is here for initial visit of the numbness in her feet for the past 6 years. She was last seen on 3/9 however, she was sent to ED due to new onset of word finding difficulties. [facility] records were reviewed. MRI brain showed no acute infarct, cta of h and n showed no major stenosis. Her word finding difficulty has improved. She feels that she thinks faster than she can talk. She states she's had these symptoms for over a year. She describes numbness in the bottom of her feet. It's alleviated by elevating her legs. She has back pain and the numbness is worse when she sits for a long period of time. \par \par Interval history: Patient was over an hour late because of family emergency.\par \par Patient was initially seen in March 2020 for numbness however patient had acute onset of word finding difficulty and was sent to the ER where MRI of the brain was negative for stroke and CT angio of the head and neck were within normal limits.\par \par Patient was last seen in May 2020 for similar symptoms.  Patient has numbness in her feet for the past 8 years and patient was advised to obtain neuropathy blood work, MRI of the lumbar spine and a vascular consult at the time but patient did not do any of the studies.\par \par Patient saw Dr. Henning in the interim and Dr. Henning referred patient to Dr. Curtis for nerve conduction and EMG studies UA for right compressive neuropathy.  EMG was performed in May 2022 which was unremarkable\par \par .Interval history January 13, 2023: Since her last visit patient saw a vascular specialist who did not think her leg symptoms were due to vascular insufficiency.  Patient's MRI of the lumbar spine shows moderate multilevel spondylosis from L3-S1.  Patient also had unremarkable nerve conduction and EMG studies.  Patient was sent for neuropathy blood work however patient states that she just had it done today.  Patient states that her cholesterol level is fine as per her PMD.  Patient had stopped the aspirin because she kept on having blood from her uterus and bowel movements and urine.  Patient was asked to follow-up with her PMD Dr. Uriostegui for further work-up. \par \par We focused on her word finding difficulties that are intermittent.  Patient denies any memory loss.\par \par Interval history April 24, 2023: Patient's neuropathy labs showed elevated LFTs.  Patient saw GI for work-up and has pending ultrasound of the abdomen.  Patient states that it has been held because of insurance issues.  Patient continues to have word finding difficulties and she is not able to get MRI of the brain that was ordered during her last visit because of insurance issues.  Patient also did not follow-up with her PMD in terms of her aspirin as one of the differential diagnosis of her word finding difficulty is chronic infarcts.  Patient will again reach out to the PMD as she should be started on aspirin 81 mg if there are no other medical contraindication.  Her blood work is significant for elevated kappa level of 2.36 with normal kappa to lambda ratio.  This is likely due to inflammatory changes.  Patient also states that she has headaches as she is in front of the computer all day and she suffers from a lot of eyestrain.  Patient was advised to see ophthalmology for new glasses as eyestrain can cause further headaches.  Patient was also advised that she may also benefit from wearing bluelight blocking glasses.\par \par CONSENT FOR VIDEO MEDICAL VISIT1. I understand that my physician wishes me to engage in a telemedicine consultation.2. My physician has explained to me how the video conferencing technology will be used to affect such a consultation will not be the same as a direct patient/health care provider visit due to the fact that I will not be in the same room as my physician 3. I understand there are potential risks to this technology, including interruptions, unauthorized access and technical difficulties. I understand that my health care provider or I can discontinue the telemedicine consult/visit if it is felt that the videoconferencing connections are not adequate for the situation.4. I understand that my healthcare information may be shared with other individuals for scheduling and billing purposes. Others may also be present during the consultation other than my physician and consulting health care provider in order to operate the video equipment. The above mentioned people will all maintain confidentiality of the information obtained. I further understand that I will be informed of their presence in the consultation and thus will have the right to request the following: (1) omit specific details of my medical history/physical examination that are personally sensitive to me; (2) ask non-medical personnel to leave the telemedicine examination room: and or (3) terminate the consultation at any time.5. I have had the alternatives to a telemedicine consultation explained to me, and in choosing to participate in a telemedicine consultation. I understand that some parts of the exam involving physical tests may be conducted by individuals at my location at the direction of the consulting health care provider.6. In an emergent consultation, I understand that the responsibility of the telemedicine consulting specialist is to advise my local practitioner and that the specialist’s responsibility will conclude upon the termination of the video conference connection.7. I understand that billing will occur from both my physician and as a facility fee from the site from which I am presented.8. I have had a direct conversation with my physician, during which I had the opportunity to ask questions in regard to this procedure. My questions have been answered and the risks, benefits and any practical alternatives have been discussed with me in a language in which I understand\par

## 2023-04-24 NOTE — DISCUSSION/SUMMARY
[FreeTextEntry1] : 57-year-old woman who is here for follow-up of her bilateral lump numbness to the level of her calves bilaterally.  Patient EMG and nerve conduction studies were unremarkable and MRI of the lumbar spine shows no significant findings.  Her neuropathy blood work shows some elevations in LFTs with normal hepatic function.  Patient is undergoing GI work-up at this time.  Patient's word finding difficulties may be due to chronic infarct.  Patient was advised to follow-up with her PMD regarding restarting aspirin as she had some rectal bleeding when she was on it before.  The importance of following through with recommendations was to stressed to the patient and she understands.  The other possibility for her word finding difficulties include early stages of primary progressive aphasia.  Patient should obtain MRI of the brain to evaluate for any atrophy to support primary progressive aphasia.  MRI was ordered for the patient again.  Patient also has headaches from her computer use.  Patient was advised to see an optometrist for new glasses and bluelight blocking glasses.  Patient will follow-up with me after the studies are completed.\par \par physician location: office\par patient location: home\par \par I spent 40 minutes of total time, during which more than 50% of the time was spent on counseling. I explained the diagnosis, other possible diagnoses, workup, and management, as well as answered any questions.\par \par This is a telemedicine visit that was performed using real time 2-way audiovisual technology. Verbal consent to participate in video visit was obtained and patient was aware of their right to refuse to participate in services delivered via telemedicine and the alternative and potential limitations of participating in a telemedicine visit vs a face-to-face visit; I have also informed the patient of my current location in the [facility] and the names of all persons participating in the telemedicine service and their role in the encounter. The patient agrees to have this service via Telehealth.\par \par  This visit occurred during the Coronavirus (COVID-19) Public Health Emergency. I discussed with the patient the nature of our telemedicine visits, that: \par • I would evaluate the patient and recommend diagnostics and treatments based on my assessment \par • Our sessions are not being recorded and that personal health information is protected \par • Our team would provide follow up care in person if/when the patient needs it.\par

## 2023-04-26 NOTE — ED ADULT NURSE REASSESSMENT NOTE - CONDITION
"improved .

## 2023-07-21 ENCOUNTER — OUTPATIENT (OUTPATIENT)
Dept: OUTPATIENT SERVICES | Facility: HOSPITAL | Age: 58
LOS: 1 days | End: 2023-07-21
Payer: COMMERCIAL

## 2023-07-21 ENCOUNTER — APPOINTMENT (OUTPATIENT)
Dept: MRI IMAGING | Facility: CLINIC | Age: 58
End: 2023-07-21
Payer: COMMERCIAL

## 2023-07-21 DIAGNOSIS — Z98.890 OTHER SPECIFIED POSTPROCEDURAL STATES: Chronic | ICD-10-CM

## 2023-07-21 DIAGNOSIS — Z98.89 OTHER SPECIFIED POSTPROCEDURAL STATES: Chronic | ICD-10-CM

## 2023-07-21 DIAGNOSIS — R47.89 OTHER SPEECH DISTURBANCES: ICD-10-CM

## 2023-07-21 PROCEDURE — 70553 MRI BRAIN STEM W/O & W/DYE: CPT | Mod: 26

## 2023-07-21 PROCEDURE — 70553 MRI BRAIN STEM W/O & W/DYE: CPT

## 2023-07-21 PROCEDURE — A9585: CPT

## 2023-07-25 ENCOUNTER — NON-APPOINTMENT (OUTPATIENT)
Age: 58
End: 2023-07-25

## 2023-08-28 ENCOUNTER — APPOINTMENT (OUTPATIENT)
Dept: OPHTHALMOLOGY | Facility: CLINIC | Age: 58
End: 2023-08-28
Payer: COMMERCIAL

## 2023-08-28 ENCOUNTER — NON-APPOINTMENT (OUTPATIENT)
Age: 58
End: 2023-08-28

## 2023-08-28 PROCEDURE — 92015 DETERMINE REFRACTIVE STATE: CPT

## 2023-08-28 PROCEDURE — 92004 COMPRE OPH EXAM NEW PT 1/>: CPT

## 2023-12-14 ENCOUNTER — APPOINTMENT (OUTPATIENT)
Dept: NEUROLOGY | Facility: CLINIC | Age: 58
End: 2023-12-14

## 2024-04-03 NOTE — ED ADULT NURSE NOTE - CAS EDN DISCHARGE INTERVENTIONS
Detail Level: None Urine Pregnancy Test Result: negative Expiration Date (Optional): 2025-04-08 Lot # (Optional): [id number] Performed By: Lul Pinto IV discontinued, cath removed intact

## 2024-09-06 ENCOUNTER — INPATIENT (INPATIENT)
Facility: HOSPITAL | Age: 59
LOS: 4 days | Discharge: HOME | DRG: 392 | End: 2024-09-11
Attending: INTERNAL MEDICINE | Admitting: STUDENT IN AN ORGANIZED HEALTH CARE EDUCATION/TRAINING PROGRAM
Payer: COMMERCIAL

## 2024-09-06 VITALS
TEMPERATURE: 99 F | HEIGHT: 64 IN | OXYGEN SATURATION: 94 % | DIASTOLIC BLOOD PRESSURE: 81 MMHG | RESPIRATION RATE: 16 BRPM | HEART RATE: 94 BPM | WEIGHT: 197.09 LBS | SYSTOLIC BLOOD PRESSURE: 114 MMHG

## 2024-09-06 DIAGNOSIS — Z98.89 OTHER SPECIFIED POSTPROCEDURAL STATES: Chronic | ICD-10-CM

## 2024-09-06 DIAGNOSIS — Z98.890 OTHER SPECIFIED POSTPROCEDURAL STATES: Chronic | ICD-10-CM

## 2024-09-06 DIAGNOSIS — K52.9 NONINFECTIVE GASTROENTERITIS AND COLITIS, UNSPECIFIED: ICD-10-CM

## 2024-09-06 LAB
ALBUMIN SERPL ELPH-MCNC: 3.8 G/DL — SIGNIFICANT CHANGE UP (ref 3.3–5)
ALP SERPL-CCNC: 109 U/L — SIGNIFICANT CHANGE UP (ref 40–120)
ALT FLD-CCNC: 76 U/L — HIGH (ref 10–45)
ANION GAP SERPL CALC-SCNC: 19 MMOL/L — HIGH (ref 5–17)
APPEARANCE UR: ABNORMAL
AST SERPL-CCNC: 75 U/L — HIGH (ref 10–40)
BACTERIA # UR AUTO: NEGATIVE /HPF — SIGNIFICANT CHANGE UP
BASOPHILS # BLD AUTO: 0.05 K/UL — SIGNIFICANT CHANGE UP (ref 0–0.2)
BASOPHILS NFR BLD AUTO: 0.7 % — SIGNIFICANT CHANGE UP (ref 0–2)
BILIRUB SERPL-MCNC: 0.7 MG/DL — SIGNIFICANT CHANGE UP (ref 0.2–1.2)
BILIRUB UR-MCNC: NEGATIVE — SIGNIFICANT CHANGE UP
BUN SERPL-MCNC: 32 MG/DL — HIGH (ref 7–23)
CALCIUM SERPL-MCNC: 9.6 MG/DL — SIGNIFICANT CHANGE UP (ref 8.4–10.5)
CAST: 13 /LPF — HIGH (ref 0–4)
CHLORIDE SERPL-SCNC: 95 MMOL/L — LOW (ref 96–108)
CO2 SERPL-SCNC: 18 MMOL/L — LOW (ref 22–31)
COLOR SPEC: YELLOW — SIGNIFICANT CHANGE UP
CREAT SERPL-MCNC: 1.55 MG/DL — HIGH (ref 0.5–1.3)
DIFF PNL FLD: ABNORMAL
EGFR: 39 ML/MIN/1.73M2 — LOW
EOSINOPHIL # BLD AUTO: 0.02 K/UL — SIGNIFICANT CHANGE UP (ref 0–0.5)
EOSINOPHIL NFR BLD AUTO: 0.3 % — SIGNIFICANT CHANGE UP (ref 0–6)
EPI CELLS # UR: PRESENT
GAS PNL BLDV: SIGNIFICANT CHANGE UP
GI PCR PANEL: SIGNIFICANT CHANGE UP
GLUCOSE SERPL-MCNC: 109 MG/DL — HIGH (ref 70–99)
GLUCOSE UR QL: NEGATIVE MG/DL — SIGNIFICANT CHANGE UP
HCT VFR BLD CALC: 41.8 % — SIGNIFICANT CHANGE UP (ref 34.5–45)
HGB BLD-MCNC: 13.9 G/DL — SIGNIFICANT CHANGE UP (ref 11.5–15.5)
HYALINE CASTS # UR AUTO: PRESENT
IMM GRANULOCYTES NFR BLD AUTO: 0.6 % — SIGNIFICANT CHANGE UP (ref 0–0.9)
KETONES UR-MCNC: ABNORMAL MG/DL
LEUKOCYTE ESTERASE UR-ACNC: NEGATIVE — SIGNIFICANT CHANGE UP
LIDOCAIN IGE QN: 14 U/L — SIGNIFICANT CHANGE UP (ref 7–60)
LYMPHOCYTES # BLD AUTO: 1.33 K/UL — SIGNIFICANT CHANGE UP (ref 1–3.3)
LYMPHOCYTES # BLD AUTO: 19.7 % — SIGNIFICANT CHANGE UP (ref 13–44)
MAGNESIUM SERPL-MCNC: 2.2 MG/DL — SIGNIFICANT CHANGE UP (ref 1.6–2.6)
MCHC RBC-ENTMCNC: 29.3 PG — SIGNIFICANT CHANGE UP (ref 27–34)
MCHC RBC-ENTMCNC: 33.3 GM/DL — SIGNIFICANT CHANGE UP (ref 32–36)
MCV RBC AUTO: 88.2 FL — SIGNIFICANT CHANGE UP (ref 80–100)
MONOCYTES # BLD AUTO: 0.93 K/UL — HIGH (ref 0–0.9)
MONOCYTES NFR BLD AUTO: 13.8 % — SIGNIFICANT CHANGE UP (ref 2–14)
NEUTROPHILS # BLD AUTO: 4.37 K/UL — SIGNIFICANT CHANGE UP (ref 1.8–7.4)
NEUTROPHILS NFR BLD AUTO: 64.9 % — SIGNIFICANT CHANGE UP (ref 43–77)
NITRITE UR-MCNC: NEGATIVE — SIGNIFICANT CHANGE UP
NRBC # BLD: 0 /100 WBCS — SIGNIFICANT CHANGE UP (ref 0–0)
PH UR: 6 — SIGNIFICANT CHANGE UP (ref 5–8)
PLATELET # BLD AUTO: 221 K/UL — SIGNIFICANT CHANGE UP (ref 150–400)
POTASSIUM SERPL-MCNC: 3.9 MMOL/L — SIGNIFICANT CHANGE UP (ref 3.5–5.3)
POTASSIUM SERPL-SCNC: 3.9 MMOL/L — SIGNIFICANT CHANGE UP (ref 3.5–5.3)
PROT SERPL-MCNC: 8 G/DL — SIGNIFICANT CHANGE UP (ref 6–8.3)
PROT UR-MCNC: 100 MG/DL
RBC # BLD: 4.74 M/UL — SIGNIFICANT CHANGE UP (ref 3.8–5.2)
RBC # FLD: 14 % — SIGNIFICANT CHANGE UP (ref 10.3–14.5)
RBC CASTS # UR COMP ASSIST: 6 /HPF — HIGH (ref 0–4)
REVIEW: SIGNIFICANT CHANGE UP
SODIUM SERPL-SCNC: 132 MMOL/L — LOW (ref 135–145)
SP GR SPEC: 1.02 — SIGNIFICANT CHANGE UP (ref 1–1.03)
SQUAMOUS # UR AUTO: 26 /HPF — HIGH (ref 0–5)
UROBILINOGEN FLD QL: 0.2 MG/DL — SIGNIFICANT CHANGE UP (ref 0.2–1)
WBC # BLD: 6.74 K/UL — SIGNIFICANT CHANGE UP (ref 3.8–10.5)
WBC # FLD AUTO: 6.74 K/UL — SIGNIFICANT CHANGE UP (ref 3.8–10.5)
WBC UR QL: 4 /HPF — SIGNIFICANT CHANGE UP (ref 0–5)

## 2024-09-06 PROCEDURE — 74177 CT ABD & PELVIS W/CONTRAST: CPT | Mod: 26,MC

## 2024-09-06 PROCEDURE — 99223 1ST HOSP IP/OBS HIGH 75: CPT | Mod: GC

## 2024-09-06 PROCEDURE — 99285 EMERGENCY DEPT VISIT HI MDM: CPT

## 2024-09-06 RX ORDER — ONDANSETRON 2 MG/ML
4 INJECTION, SOLUTION INTRAMUSCULAR; INTRAVENOUS EVERY 8 HOURS
Refills: 0 | Status: DISCONTINUED | OUTPATIENT
Start: 2024-09-06 | End: 2024-09-11

## 2024-09-06 RX ORDER — ONDANSETRON 2 MG/ML
4 INJECTION, SOLUTION INTRAMUSCULAR; INTRAVENOUS ONCE
Refills: 0 | Status: COMPLETED | OUTPATIENT
Start: 2024-09-06 | End: 2024-09-06

## 2024-09-06 RX ORDER — ACETAMINOPHEN 325 MG/1
650 TABLET ORAL EVERY 6 HOURS
Refills: 0 | Status: DISCONTINUED | OUTPATIENT
Start: 2024-09-06 | End: 2024-09-11

## 2024-09-06 RX ORDER — AMPICILLIN SODIUM AND SULBACTAM SODIUM 1; .5 G/1; G/1
3 INJECTION, POWDER, FOR SOLUTION INTRAMUSCULAR; INTRAVENOUS ONCE
Refills: 0 | Status: COMPLETED | OUTPATIENT
Start: 2024-09-06 | End: 2024-09-06

## 2024-09-06 RX ORDER — SODIUM CHLORIDE 9 MG/ML
1000 INJECTION INTRAMUSCULAR; INTRAVENOUS; SUBCUTANEOUS ONCE
Refills: 0 | Status: COMPLETED | OUTPATIENT
Start: 2024-09-06 | End: 2024-09-06

## 2024-09-06 RX ORDER — ERGOCALCIFEROL (VITAMIN D2) 200 MCG/ML
1 DROPS ORAL
Refills: 0 | DISCHARGE

## 2024-09-06 RX ADMIN — ONDANSETRON 4 MILLIGRAM(S): 2 INJECTION, SOLUTION INTRAMUSCULAR; INTRAVENOUS at 15:32

## 2024-09-06 RX ADMIN — Medication 75 MILLILITER(S): at 21:17

## 2024-09-06 RX ADMIN — SODIUM CHLORIDE 1000 MILLILITER(S): 9 INJECTION INTRAMUSCULAR; INTRAVENOUS; SUBCUTANEOUS at 16:30

## 2024-09-06 RX ADMIN — SODIUM CHLORIDE 1000 MILLILITER(S): 9 INJECTION INTRAMUSCULAR; INTRAVENOUS; SUBCUTANEOUS at 15:30

## 2024-09-06 RX ADMIN — AMPICILLIN SODIUM AND SULBACTAM SODIUM 200 GRAM(S): 1; .5 INJECTION, POWDER, FOR SOLUTION INTRAMUSCULAR; INTRAVENOUS at 18:48

## 2024-09-06 RX ADMIN — ACETAMINOPHEN 650 MILLIGRAM(S): 325 TABLET ORAL at 21:24

## 2024-09-06 NOTE — H&P ADULT - NSHPSOCIALHISTORY_GEN_ALL_CORE
Lives alone w/ 2 dogs. 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

## 2024-09-06 NOTE — ED ADULT NURSE NOTE - OBJECTIVE STATEMENT
Patient  is  alert  and  oriented x4. Color is good and  skin warm to touch.  She  is  c/o abdominal pain, nausea an  diarrhea  x  4 days.  She  has been afebrile  . She  appears  very  weak.

## 2024-09-06 NOTE — H&P ADULT - NSHPLABSRESULTS_GEN_ALL_CORE
LABS:                          13.9   6.74  )-----------( 221      ( 06 Sep 2024 15:43 )             41.8     09-06    132<L>  |  95<L>  |  32<H>  ----------------------------<  109<H>  3.9   |  18<L>  |  1.55<H>    Ca    9.6      06 Sep 2024 15:43  Mg     2.2     09-06    TPro  8.0  /  Alb  3.8  /  TBili  0.7  /  DBili  x   /  AST  75<H>  /  ALT  76<H>  /  AlkPhos  109  09-06 LABS:                          13.9   6.74  )-----------( 221      ( 06 Sep 2024 15:43 )             41.8     09-06    132<L>  |  95<L>  |  32<H>  ----------------------------<  109<H>  3.9   |  18<L>  |  1.55<H>    Ca    9.6      06 Sep 2024 15:43  Mg     2.2     09-06    TPro  8.0  /  Alb  3.8  /  TBili  0.7  /  DBili  x   /  AST  75<H>  /  ALT  76<H>  /  AlkPhos  109  09-06    CTAP w/ IVC (9/6):  FINDINGS:    LOWER CHEST: Within normal limits.    LIVER: Hepatic steatosis.  BILE DUCTS: Normal caliber.  GALLBLADDER: Status post cholecystectomy.  SPLEEN: Within normal limits.  PANCREAS: Within normal limits.  ADRENALS: Within normal limits.  KIDNEYS/URETERS: Within normal limits.    BLADDER: Within normal limits.  REPRODUCTIVE ORGANS: Fibroid uterus.    BOWEL: No bowel obstruction. The appendix is normal.  Mild pancolonic   thickening.  PERITONEUM/RETROPERITONEUM: Within normal limits.  VESSELS:  Within normal limits.  ABDOMINAL WALL: Within normal limits.  BONES: Within normal limits.    IMPRESSION: Pancolitis.

## 2024-09-06 NOTE — H&P ADULT - NSICDXPASTSURGICALHX_GEN_ALL_CORE_FT
PAST SURGICAL HISTORY:  History of ankle surgery     History of myomectomy     History of tonsillectomy and adenoidectomy at age 5    S/P cholecystectomy

## 2024-09-06 NOTE — ED PROVIDER NOTE - ATTENDING CONTRIBUTION TO CARE
59 yo female, hx of diverticular disease p/w N/V/D x several days.  family @ bedside for collateral.     nontoxic on exam, mild abdominal TTP.  CBC, CMP sent.  CT abdomen/pelvis done showing pancolitis.  IV antibiotics administered.    admit for further management.

## 2024-09-06 NOTE — H&P ADULT - PROBLEM SELECTOR PLAN 5
- c/w home amlodipine 10mg QD  - c/w home carvedilol 6.25mg QD  - hold home triamterene-HCTZ 37.5mg-25mg QD given MONICA  - c/w monitoring VSS q8h

## 2024-09-06 NOTE — H&P ADULT - NSICDXFAMILYHX_GEN_ALL_CORE_FT
FAMILY HISTORY:  Family history of hyperlipidemia  Family history of hypertension    Mother  Still living? Yes, Estimated age: 71-80  Family history of diabetes mellitus, Age at diagnosis: Age Unknown    Uncle  Still living? No  Family history of stomach cancer, Age at diagnosis: Age Unknown

## 2024-09-06 NOTE — H&P ADULT - PROBLEM SELECTOR PLAN 1
- GI PCR pending results  - ordered stool culture, calprotectin  - will hold off on anti-diarrheals until infectious causes are ruled out - GI PCR (-)  - ordered stool culture, calprotectin  - c/w Unasyn IV for now  - will hold off on anti-diarrheals until infectious causes are ruled out

## 2024-09-06 NOTE — ED PROVIDER NOTE - PHYSICAL EXAMINATION
PHYSICAL EXAM:  GENERAL: Appears dehydrated   HEAD: Normocephalic  EYES: EOMI, PERRLA, conjunctiva and sclera clear  ENT: White patches on tongue, tonsil not enlarged   NECK: Supple, No cervical LAD   LUNG: CTA b/l  HEART: RRR, +S1/S2; No m/r/g  ABDOMEN: soft, NT/ND; BS audible   EXTREMITIES:  2+ Peripheral Pulses, brisk cap refill. No clubbing, cyanosis, or edema  NERVOUS SYSTEM:  AAOx3, speech clear. No sensory/motor deficits   SKIN: Skin maceration to BL feet ? fungal

## 2024-09-06 NOTE — H&P ADULT - HISTORY OF PRESENT ILLNESS
Pt is 58 y.o. F w/ PMH of HTN, asthma, and GERD who presents for acute non-bloody diarrhea  Pt is 58 y.o. F w/ PMH of HTN, asthma, and GERD who presents for acute non-bloody diarrhea a/w nausea and non-bloody, non-bilious vomiting. 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 She describes the diarrhea as watery, non-bloody, odorous, dark brown, and nonstop. She states that it occurs every 30-40 min and sometimes happens so quickly that she is not able to make it to the bathroom in time.     In the ED, pt was HDS, afebrile. U/A (-). CTAP showed pancolitis. Na 132, Cr 1.55. S/p 1-time dose of Unasyn. GI PCR, BCx, UCx were done. Lipase wnl. Pt is 58 y.o. F w/ PMH of HTN, asthma, and GERD who presents for acute non-bloody diarrhea a/w nausea and non-bloody, non-bilious vomiting. 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 She had a planned GI appt next week for chronic constant RUQ pain that has been occurring for the past few months and feelings of distension in her epigastric region, but after having these episodes of diarrhea, pt had a video appt with her GI doctor who recommended calling EMS. She describes the diarrhea as watery, non-bloody, odorous, dark brown, and nonstop. She states that it had been occurring every 30-40 min and sometimes happens so quickly that she is not able to make it to the bathroom in time. She has also had body pains, headaches, dizziness, subjective fevers/chills - she's not sure how high her fevers have been as she doesn't have a thermometer and has tried taking Tylenol but they were only relieved with Motrin. She has not been able to eat and has only been drinking fluids since the episodes first started. She endorsed a 12-lb weight loss in the past few days and depression because she left her dogs alone at home but denied any chest pain, SOB, urinary symptoms, numbness/tingling. Her last colonoscopy was 4 years ago- only diverticulosis was found.    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 Prior to the onset of diarrhea, during the last week, pt did home visits on 8/26-8/28, worked remotely on 8/29, attended a fundraising event on 8/30 where she ate Chinese food including chicken and beef, and a BBQ on 8/31 where she ate Hungarian food, potatoes, Russian salad, chicken, and energy shakes. She doesn't recall what she had 2 days prior to the onset of diarrhea and denied any sick contacts.    In the ED, pt was HDS, afebrile. U/A (-). CTAP showed pancolitis. Na 132, Cr 1.55. S/p 1-time dose of Unasyn. GI PCR, BCx, UCx were done. Lipase wnl.    At the time of exam, pt stated that she continues having diarrhea but every 2 hours now. Her vomiting has resolved after being given Zofran but she continues having stomach cramps, mostly in the LUQ and LLQ. Pt is 58 y.o. F w/ PMH of HTN, asthma, and GERD who presents for acute non-bloody diarrhea a/w nausea and non-bloody, non-bilious vomiting. 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 She had a planned GI appt next week for chronic constant RUQ pain that has been occurring for the past few months and feelings of distension in her epigastric region, but after having these episodes of diarrhea, pt had a video appt with her GI doctor who recommended calling EMS. She describes the diarrhea as watery, non-bloody, odorous, dark brown, and nonstop. She states that it had been occurring every 30-40 min and sometimes happens so quickly that she is not able to make it to the bathroom in time. She has also had body pains, headaches, dizziness, subjective fevers/chills - she's not sure how high her fevers have been as she doesn't have a thermometer and has tried taking Tylenol but they were only relieved with Motrin. She has not been able to eat and has only been drinking fluids since the episodes first started. She endorsed a 12-lb weight loss in the past few days and depression because she left her dogs alone at home but denied any chest pain, SOB, urinary symptoms, numbness/tingling. Her last colonoscopy was 4 years ago- only diverticulosis was found.    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 Prior to the onset of diarrhea, during the last week, pt did home visits on 8/26-8/28, worked remotely on 8/29, attended a fundraising event on 8/30 where she ate Chinese food including chicken and beef, and a BBQ on 8/31 where she ate [place] food, potatoes, Russian salad, chicken, and energy shakes. She doesn't recall what she had 2 days prior to the onset of diarrhea and denied any sick contacts. She recently had covid three weeks ago and states that after she wasn't getting better, she was started on prednisone and azithromycin at Urgent Care.     In the ED, pt was HDS, afebrile. U/A (-). CTAP showed hepatic steatosis and pancolitis. Na 132, Cr 1.55. S/p 1-time dose of Unasyn. GI PCR, BCx, UCx were done. WBC count, lipase wnl.    At the time of exam, pt stated that she continues having diarrhea but every 2 hours now. Her vomiting has resolved after being given Zofran but she continues having stomach cramps, mostly in the LUQ and LLQ.

## 2024-09-06 NOTE — PATIENT PROFILE ADULT - FALL HARM RISK - RISK INTERVENTIONS

## 2024-09-06 NOTE — PATIENT PROFILE ADULT - FALL HARM RISK - TYPE OF ASSESSMENT
DATE OF SERVICE: 04-15-24 @ 15:28    Patient is a 80y old  Female who presents with a chief complaint of shortness of breath and leg swelling (15 Apr 2024 13:18)      INTERVAL HISTORY: No acute events, feels well     REVIEW OF SYSTEMS:  CONSTITUTIONAL: No weakness  EYES/ENT: No visual changes;  No throat pain   NECK: No pain or stiffness  RESPIRATORY: No cough, wheezing; No shortness of breath  CARDIOVASCULAR: No chest pain or palpitations  GASTROINTESTINAL: No abdominal  pain. No nausea, vomiting, or hematemesis  GENITOURINARY: No dysuria, frequency or hematuria  NEUROLOGICAL: No stroke like symptoms  SKIN: No rashes    TELEMETRY Personally reviewed:  65-72  	  MEDICATIONS:  hydrALAZINE 100 milliGRAM(s) Oral three times a day  labetalol 400 milliGRAM(s) Oral three times a day  NIFEdipine XL 60 milliGRAM(s) Oral two times a day        PHYSICAL EXAM:  T(C): 36.9 (04-15-24 @ 13:06), Max: 37.3 (04-15-24 @ 04:14)  HR: 67 (04-15-24 @ 13:06) (65 - 72)  BP: 192/80 (04-15-24 @ 13:06) (130/68 - 192/80)  RR: 17 (04-15-24 @ 13:06) (17 - 18)  SpO2: 98% (04-15-24 @ 13:06) (92% - 99%)  Wt(kg): --  I&O's Summary    14 Apr 2024 07:01  -  15 Apr 2024 07:00  --------------------------------------------------------  IN: 480 mL / OUT: 0 mL / NET: 480 mL    15 Apr 2024 07:01  -  15 Apr 2024 15:28  --------------------------------------------------------  IN: 60 mL / OUT: 0 mL / NET: 60 mL          Appearance: In no distress	  HEENT:    PERRL, EOMI	  Cardiovascular:  S1 S2, No JVD  Respiratory: Lungs clear to auscultation	  Gastrointestinal:  Soft, Non-tender, + BS	  Vascularature:  No edema of LE  Psychiatric: Appropriate affect   Neuro: no acute focal deficits                               7.9    3.69  )-----------( 166      ( 15 Apr 2024 06:57 )             25.2     04-15    137  |  102  |  72<H>  ----------------------------<  84  4.6   |  18<L>  |  5.13<H>    Ca    8.1<L>      15 Apr 2024 07:00  Phos  6.0     04-15  Mg     1.7     04-15          Labs personally reviewed      ASSESSMENT/PLAN: 	  80f h/o HTN, s/p Renal transplant, CKD, chronic rejection, recent admission for pneumonia s/p abx presented with shortness of breath since this morning. she also noticed significant LUE swelling that began 4-5 days ago    1. Diastolic Heart Failure secondary to advanced kidney disease   - TTE in Jan 2024 with normal EF, moderate DD, elevated filling pressures, severe LVH, severely dilated LA, small pericardial effusion  - cardiac amyloid with PYP scan normal   - CXR shows cardiomegaly and pulm edema  - BNP almost 19K  - Appreciate Renal and TP recs  - Monitor Strict I&Os, daily weights  - Lasix now DC'd given improvement in her clinical symptoms and risking Cr/BUN    2. HTN - improved   - c/w labetalol 200mg PO TID. Now increased to 400mg TID  - Continue Nifedipine 90mg daily. Nifedipine now increased to 60mg BID  - Hydralazine 100mg PO TID     3. ESRD  - Renal recs appreciated            RUDI De La Paz DO [facility]  Cardiovascular Medicine  [address], [address]  Available via call or text on [employer]  Office: [phone]   Admission

## 2024-09-06 NOTE — H&P ADULT - NSHPREVIEWOFSYSTEMS_GEN_ALL_CORE
REVIEW OF SYSTEMS:    CONSTITUTIONAL: No weakness, fevers or chills  EYES/ENT: No visual changes;  No vertigo or throat pain   NECK: No pain or stiffness  RESPIRATORY: No cough, wheezing, hemoptysis; No shortness of breath  CARDIOVASCULAR: No chest pain or palpitations  GASTROINTESTINAL: No abdominal or epigastric pain. No nausea, vomiting, or hematemesis; No diarrhea or constipation. No melena or hematochezia.  GENITOURINARY: No dysuria, frequency or hematuria  NEUROLOGICAL: No numbness or weakness  SKIN: No itching, rashes REVIEW OF SYSTEMS (at time of exam):    CONSTITUTIONAL: Weakness, fevers or chills  NECK: No pain or stiffness  RESPIRATORY: No cough, wheezing; No shortness of breath  CARDIOVASCULAR: No chest pain or palpitations  GASTROINTESTINAL: Abdominal pain from cramps. No nausea, vomiting. No melena or hematochezia.  GENITOURINARY: No dysuria, frequency or hematuria  NEUROLOGICAL: No numbness or weakness  SKIN: Chronic rash on b/l feet

## 2024-09-06 NOTE — H&P ADULT - ATTENDING COMMENTS
58 y.o. F w/ PMH of HTN, asthma, and GERD presenting with acute non-bloody diarrhea a/w nausea and non-bloody, non-bilious vomiting x 5 days. Concern for infectious vs inflammatory diarrhea. CT showing evidence of pancolitis. Pt admitted for further workup and management.     # Pancolitis  # Acute infectious diarrhea  # MONICA  # HTN    Plan:  - Will continue unasyn for empiric management of possible infectious diarrhea. GI PCR noted to be negative. Will F/u Stool culture  - If symptoms worsen or develops leukocytosis, fevers, will consider C.diff testing  - MONICA likely pre-renal in setting of poor po intake and frequent diarrhea. Continue IVF resuscitation. Monitor electrolytes, replete as needed. Repeat BMP  - Continue amlodipine and coreg for HTN, holding Triamterene-HCTZ given MONICA    Discussed case with resident Dr. Mane

## 2024-09-06 NOTE — ED PROVIDER NOTE - CLINICAL SUMMARY MEDICAL DECISION MAKING FREE TEXT BOX
58 Yr female PMHx HTN Asthma now with nausea, vomiting, diarrhea since Tuesday. VSS. On exam dehydrated, concern for infectious AGE. Will send labs, to evaluate for leucopenia, electrolyte imb, MONICA, UTI. Will give IVF, Zofran. shall re-evaluate. 58 Yr female PMHx HTN Asthma now with nausea, vomiting, diarrhea since Tuesday. VSS. On exam dehydrated, concern for infectious AGE. Will send labs, to evaluate for leucopenia, electrolyte imb, MONICA, UTI. Will give IVF, Zofran. shall re-evaluate.    see attending attestation authored by me, Tahir Zaragoza MD, for further MDM details.

## 2024-09-06 NOTE — H&P ADULT - PROBLEM SELECTOR PLAN 4
- pt not in respiratory distress or w/ increased WOB - lungs CTAB  - c/w home montelukast 10mg QD  - c/w home albuterol PRN  - c/w home Flonase

## 2024-09-06 NOTE — H&P ADULT - PROBLEM SELECTOR PLAN 2
- Cr 1.55 on admission  - likely pre-renal given dehydration and lack of PO intake  - c/w LR fluids  - obtain urine Na, K, Cl, Ca, osmolality, urea, protein/Cr ratio  - monitor U/O - Cr 1.55 on admission (outpt records show Cr 0.6)  - likely pre-renal given dehydration and lack of PO intake  - c/w LR fluids  - obtain urine Na, K, Cl, Ca, osmolality, urea, protein/Cr ratio  - monitor U/O - Cr 1.55 on admission (outpt records show Cr 0.6)  - likely pre-renal given dehydration and lack of PO intake  - c/w LR fluids  - obtain urine lytes, osmolality, urea, protein/Cr ratio  - monitor U/O

## 2024-09-06 NOTE — ED ADULT NURSE REASSESSMENT NOTE - NS ED NURSE REASSESS COMMENT FT1
Report received from Lor DOYLE. Pt A&Ox4, in no acute distress at time. Patient awaiting bed assignment at this time. Patient safety maintained, bed is in lowest position, wheels locked, and side rails raised. Patient oriented to call bell, and call bell is within reach.

## 2024-09-06 NOTE — H&P ADULT - SOCIAL HISTORY: ALCOHOL USE
states she stopped drinking completely but used to drink a lot on most weekends from [id number] after her son  (wasn't able to name how many drinks)

## 2024-09-06 NOTE — PATIENT PROFILE ADULT - PATIENT/CAREGIVER OFFERED  INTERPRETER SERVICES
Patient currently receiving [facility].      Home care goals have not been met.     Please place orders to resume services upon discharge if appropriate.   Liaison will continue to follow until discharge.    Chloe TOLEDO RN Home Care and Hospice Services Liaison,  D: [phone], C: [phone]     yes

## 2024-09-06 NOTE — H&P ADULT - NSHPPHYSICALEXAM_GEN_ALL_CORE
T(C): 37.4 (09-06-24 @ 20:49), Max: 37.4 (09-06-24 @ 20:49)  HR: 99 (09-06-24 @ 20:49) (71 - 99)  BP: 113/75 (09-06-24 @ 20:49) (113/75 - 146/71)  RR: 18 (09-06-24 @ 20:49) (16 - 18)  SpO2: 95% (09-06-24 @ 20:49) (94% - 97%)    CONSTITUTIONAL: Well groomed, no apparent distress  EYES: PERRLA and symmetric, EOMI, No conjunctival or scleral injection, non-icteric  ENMT: Oral mucosa with moist membranes. Normal dentition; no pharyngeal injection or exudates             NECK: Supple, symmetric and without tracheal deviation   RESP: No respiratory distress, no use of accessory muscles; CTA b/l, no WRR  CV: RRR, +S1S2, no MRG; no JVD; no peripheral edema  GI: Soft, NT, ND, no rebound, no guarding; no palpable masses; no hepatosplenomegaly; no hernia palpated  LYMPH: No cervical LAD or tenderness; no axillary LAD or tenderness; no inguinal LAD or tenderness  MSK: Normal gait; No digital clubbing or cyanosis; examination of the (head/neck/spine/ribs/pelvis, RUE, LUE, RLE, LLE) without misalignment,            Normal ROM without pain, no spinal tenderness, normal muscle strength/tone  SKIN: No rashes or ulcers noted; no subcutaneous nodules or induration palpable  NEURO: CN II-XII intact; normal reflexes in upper and lower extremities, sensation intact in upper and lower extremities b/l to light touch   PSYCH: Appropriate insight/judgment; A+O x 3, mood and affect appropriate, recent/remote memory intact T(C): 37.4 (09-06-24 @ 20:49), Max: 37.4 (09-06-24 @ 20:49)  HR: 99 (09-06-24 @ 20:49) (71 - 99)  BP: 113/75 (09-06-24 @ 20:49) (113/75 - 146/71)  RR: 18 (09-06-24 @ 20:49) (16 - 18)  SpO2: 95% (09-06-24 @ 20:49) (94% - 97%)    CONSTITUTIONAL: Visibly fatigued, anxious-looking.  EYES: PERRLA and symmetric, EOMI, No conjunctival or scleral injection, non-icteric  ENMT: Oral mucosa with dry membranes. Normal dentition; no pharyngeal injection or exudates             NECK: Supple, symmetric and without tracheal deviation   RESP: No respiratory distress, no use of accessory muscles; CTA b/l, no WRR  CV: RRR, +S1S2, no MRG; no JVD; no peripheral edema  GI: Soft, ND; discomfort upon palpation in RUQ, epigastric, and umbilical regions; no palpable masses; no hepatosplenomegaly; no hernia palpated  MSK: Normal gait; No digital clubbing or cyanosis; examination of the (head/neck/spine/ribs/pelvis, RUE, LUE, RLE, LLE) without misalignment,            Normal ROM without pain, normal muscle strength/tone  SKIN: Peeling pruritic rash on sole of b/l feet, psoriatic lesions on b/l fingers  NEURO: sensation intact in upper and lower extremities b/l to light touch   PSYCH: Appropriate insight/judgment; A+O x 3, mood and affect appropriate but tearful about situation, recent/remote memory intact

## 2024-09-06 NOTE — ED PROVIDER NOTE - OBJECTIVE STATEMENT
58 Yr female PMHx HTN Asthma now with nausea, vomiting, diarrhea since Tuesday.   Patient reports having BBQ on Saturday, works as a  and has patient contact, started feeling nauseous on tuesday, followed by multiple episodes of non projectile/non bilious vomiting. Cannot recall/confirm no. of episodes. Patient also reports watery non bloody diarrhea - frequent episodes Q30 min.   Subjective fever, chills, hoarsness of voice.     Does not c/o shortness of breath, chest pain, palpitations, urinary complaints, skin rash.

## 2024-09-06 NOTE — H&P ADULT - ASSESSMENT
Pt is 58 y.o. F w/ PMH of HTN, asthma, and GERD who presents for acute non-bloody diarrhea a/w nausea and non-bloody, non-bilious vomiting x 5 days. Also with recent weight loss, headaches, dizziness, body pains, subjective fevers/chills. CTAP showed hepatic steatosis and pancolitis. S/p one-time dose of Unasyn. GI PCR, BCx, UCx pending results. Pt is 58 y.o. F w/ PMH of HTN, asthma, and GERD who presents for acute non-bloody diarrhea a/w nausea and non-bloody, non-bilious vomiting x 5 days. Also with recent weight loss, headaches, dizziness, body pains, subjective fevers/chills. CTAP showed hepatic steatosis and pancolitis. S/p one-time dose of Unasyn. GI PCR (-), BCx, UCx pending results.

## 2024-09-07 DIAGNOSIS — I10 ESSENTIAL (PRIMARY) HYPERTENSION: ICD-10-CM

## 2024-09-07 DIAGNOSIS — E87.1 HYPO-OSMOLALITY AND HYPONATREMIA: ICD-10-CM

## 2024-09-07 DIAGNOSIS — Z29.9 ENCOUNTER FOR PROPHYLACTIC MEASURES, UNSPECIFIED: ICD-10-CM

## 2024-09-07 DIAGNOSIS — F41.9 ANXIETY DISORDER, UNSPECIFIED: ICD-10-CM

## 2024-09-07 DIAGNOSIS — K52.9 NONINFECTIVE GASTROENTERITIS AND COLITIS, UNSPECIFIED: ICD-10-CM

## 2024-09-07 DIAGNOSIS — J45.909 UNSPECIFIED ASTHMA, UNCOMPLICATED: ICD-10-CM

## 2024-09-07 DIAGNOSIS — N17.9 ACUTE KIDNEY FAILURE, UNSPECIFIED: ICD-10-CM

## 2024-09-07 DIAGNOSIS — Z90.49 ACQUIRED ABSENCE OF OTHER SPECIFIED PARTS OF DIGESTIVE TRACT: Chronic | ICD-10-CM

## 2024-09-07 LAB
-  COAGULASE NEGATIVE STAPHYLOCOCCUS: SIGNIFICANT CHANGE UP
ALBUMIN SERPL ELPH-MCNC: 3.6 G/DL — SIGNIFICANT CHANGE UP (ref 3.3–5)
ALP SERPL-CCNC: 106 U/L — SIGNIFICANT CHANGE UP (ref 40–120)
ALT FLD-CCNC: 73 U/L — HIGH (ref 10–45)
ANION GAP SERPL CALC-SCNC: 16 MMOL/L — SIGNIFICANT CHANGE UP (ref 5–17)
ANION GAP SERPL CALC-SCNC: 17 MMOL/L — SIGNIFICANT CHANGE UP (ref 5–17)
AST SERPL-CCNC: 62 U/L — HIGH (ref 10–40)
BILIRUB SERPL-MCNC: 0.5 MG/DL — SIGNIFICANT CHANGE UP (ref 0.2–1.2)
BUN SERPL-MCNC: 18 MG/DL — SIGNIFICANT CHANGE UP (ref 7–23)
BUN SERPL-MCNC: 19 MG/DL — SIGNIFICANT CHANGE UP (ref 7–23)
CALCIUM SERPL-MCNC: 9.2 MG/DL — SIGNIFICANT CHANGE UP (ref 8.4–10.5)
CALCIUM SERPL-MCNC: 9.3 MG/DL — SIGNIFICANT CHANGE UP (ref 8.4–10.5)
CHLORIDE SERPL-SCNC: 94 MMOL/L — LOW (ref 96–108)
CHLORIDE SERPL-SCNC: 96 MMOL/L — SIGNIFICANT CHANGE UP (ref 96–108)
CHLORIDE UR-SCNC: <20 MMOL/L — SIGNIFICANT CHANGE UP
CO2 SERPL-SCNC: 21 MMOL/L — LOW (ref 22–31)
CO2 SERPL-SCNC: 23 MMOL/L — SIGNIFICANT CHANGE UP (ref 22–31)
CREAT ?TM UR-MCNC: 30 MG/DL — SIGNIFICANT CHANGE UP
CREAT SERPL-MCNC: 1 MG/DL — SIGNIFICANT CHANGE UP (ref 0.5–1.3)
CREAT SERPL-MCNC: 1.05 MG/DL — SIGNIFICANT CHANGE UP (ref 0.5–1.3)
EGFR: 62 ML/MIN/1.73M2 — SIGNIFICANT CHANGE UP
EGFR: 65 ML/MIN/1.73M2 — SIGNIFICANT CHANGE UP
GLUCOSE SERPL-MCNC: 100 MG/DL — HIGH (ref 70–99)
GLUCOSE SERPL-MCNC: 110 MG/DL — HIGH (ref 70–99)
GRAM STN FLD: ABNORMAL
HCT VFR BLD CALC: 36.2 % — SIGNIFICANT CHANGE UP (ref 34.5–45)
HGB BLD-MCNC: 12.3 G/DL — SIGNIFICANT CHANGE UP (ref 11.5–15.5)
MAGNESIUM SERPL-MCNC: 2.2 MG/DL — SIGNIFICANT CHANGE UP (ref 1.6–2.6)
MAGNESIUM SERPL-MCNC: 2.2 MG/DL — SIGNIFICANT CHANGE UP (ref 1.6–2.6)
MCHC RBC-ENTMCNC: 29.5 PG — SIGNIFICANT CHANGE UP (ref 27–34)
MCHC RBC-ENTMCNC: 34 GM/DL — SIGNIFICANT CHANGE UP (ref 32–36)
MCV RBC AUTO: 86.8 FL — SIGNIFICANT CHANGE UP (ref 80–100)
METHOD TYPE: SIGNIFICANT CHANGE UP
NRBC # BLD: 0 /100 WBCS — SIGNIFICANT CHANGE UP (ref 0–0)
OSMOLALITY UR: 144 MOS/KG — LOW (ref 300–900)
PHOSPHATE SERPL-MCNC: 1.9 MG/DL — LOW (ref 2.5–4.5)
PHOSPHATE SERPL-MCNC: 1.9 MG/DL — LOW (ref 2.5–4.5)
PLATELET # BLD AUTO: 284 K/UL — SIGNIFICANT CHANGE UP (ref 150–400)
POTASSIUM SERPL-MCNC: 2.5 MMOL/L — CRITICAL LOW (ref 3.5–5.3)
POTASSIUM SERPL-MCNC: 2.5 MMOL/L — CRITICAL LOW (ref 3.5–5.3)
POTASSIUM SERPL-SCNC: 2.5 MMOL/L — CRITICAL LOW (ref 3.5–5.3)
POTASSIUM SERPL-SCNC: 2.5 MMOL/L — CRITICAL LOW (ref 3.5–5.3)
POTASSIUM UR-SCNC: 6 MMOL/L — SIGNIFICANT CHANGE UP
PROT ?TM UR-MCNC: 9 MG/DL — SIGNIFICANT CHANGE UP (ref 0–12)
PROT SERPL-MCNC: 7 G/DL — SIGNIFICANT CHANGE UP (ref 6–8.3)
PROT/CREAT UR-RTO: 0.3 RATIO — HIGH (ref 0–0.2)
RBC # BLD: 4.17 M/UL — SIGNIFICANT CHANGE UP (ref 3.8–5.2)
RBC # FLD: 13.8 % — SIGNIFICANT CHANGE UP (ref 10.3–14.5)
SODIUM SERPL-SCNC: 133 MMOL/L — LOW (ref 135–145)
SODIUM SERPL-SCNC: 134 MMOL/L — LOW (ref 135–145)
SODIUM UR-SCNC: <5 MMOL/L — SIGNIFICANT CHANGE UP
SPECIMEN SOURCE: SIGNIFICANT CHANGE UP
WBC # BLD: 7.69 K/UL — SIGNIFICANT CHANGE UP (ref 3.8–10.5)
WBC # FLD AUTO: 7.69 K/UL — SIGNIFICANT CHANGE UP (ref 3.8–10.5)

## 2024-09-07 PROCEDURE — 99233 SBSQ HOSP IP/OBS HIGH 50: CPT | Mod: GC

## 2024-09-07 RX ORDER — FLUTICASONE PROPIONATE 50 UG/1
1 SPRAY, METERED NASAL
Refills: 0 | DISCHARGE

## 2024-09-07 RX ORDER — PANTOPRAZOLE SODIUM 40 MG
40 TABLET, DELAYED RELEASE (ENTERIC COATED) ORAL
Refills: 0 | Status: DISCONTINUED | OUTPATIENT
Start: 2024-09-07 | End: 2024-09-11

## 2024-09-07 RX ORDER — PIPERACILLIN SODIUM AND TAZOBACTAM SODIUM 3; .375 G/15ML; G/15ML
3.38 INJECTION, POWDER, FOR SOLUTION INTRAVENOUS ONCE
Refills: 0 | Status: COMPLETED | OUTPATIENT
Start: 2024-09-07 | End: 2024-09-07

## 2024-09-07 RX ORDER — VANCOMYCIN/0.9 % SOD CHLORIDE 1.75G/25
PLASTIC BAG, INJECTION (ML) INTRAVENOUS
Refills: 0 | Status: DISCONTINUED | OUTPATIENT
Start: 2024-09-07 | End: 2024-09-08

## 2024-09-07 RX ORDER — MONTELUKAST SODIUM 5 MG/1
10 TABLET, CHEWABLE ORAL DAILY
Refills: 0 | Status: DISCONTINUED | OUTPATIENT
Start: 2024-09-07 | End: 2024-09-11

## 2024-09-07 RX ORDER — MONTELUKAST SODIUM 5 MG/1
1 TABLET, CHEWABLE ORAL
Refills: 0 | DISCHARGE

## 2024-09-07 RX ORDER — FAMOTIDINE 10 MG/ML
20 INJECTION INTRAVENOUS DAILY
Refills: 0 | Status: DISCONTINUED | OUTPATIENT
Start: 2024-09-07 | End: 2024-09-11

## 2024-09-07 RX ORDER — PIPERACILLIN SODIUM AND TAZOBACTAM SODIUM 3; .375 G/15ML; G/15ML
3.38 INJECTION, POWDER, FOR SOLUTION INTRAVENOUS ONCE
Refills: 0 | Status: COMPLETED | OUTPATIENT
Start: 2024-09-08 | End: 2024-09-08

## 2024-09-07 RX ORDER — POTASSIUM CHLORIDE 10 MEQ
10 TABLET, EXT RELEASE, PARTICLES/CRYSTALS ORAL ONCE
Refills: 0 | Status: COMPLETED | OUTPATIENT
Start: 2024-09-07 | End: 2024-09-07

## 2024-09-07 RX ORDER — DEXTROSE, SODIUM ACETATE, POTASSIUM CHLORIDE, POTASSIUM PHOSPHATE, AND SODIUM CHLORIDE 5; .15; .13; .28; .091 G/100ML; G/100ML; G/100ML; G/100ML; G/100ML
1000 INJECTION, SOLUTION INTRAVENOUS
Refills: 0 | Status: DISCONTINUED | OUTPATIENT
Start: 2024-09-07 | End: 2024-09-08

## 2024-09-07 RX ORDER — ESCITALOPRAM OXALATE 10 MG/1
10 TABLET ORAL DAILY
Refills: 0 | Status: DISCONTINUED | OUTPATIENT
Start: 2024-09-07 | End: 2024-09-11

## 2024-09-07 RX ORDER — POTASSIUM CHLORIDE 10 MEQ
1 TABLET, EXT RELEASE, PARTICLES/CRYSTALS ORAL
Refills: 0 | DISCHARGE

## 2024-09-07 RX ORDER — VANCOMYCIN/0.9 % SOD CHLORIDE 1.75G/25
1250 PLASTIC BAG, INJECTION (ML) INTRAVENOUS EVERY 12 HOURS
Refills: 0 | Status: DISCONTINUED | OUTPATIENT
Start: 2024-09-08 | End: 2024-09-08

## 2024-09-07 RX ORDER — POTASSIUM CHLORIDE 10 MEQ
40 TABLET, EXT RELEASE, PARTICLES/CRYSTALS ORAL EVERY 4 HOURS
Refills: 0 | Status: COMPLETED | OUTPATIENT
Start: 2024-09-07 | End: 2024-09-07

## 2024-09-07 RX ORDER — CARVEDILOL 6.25 MG/1
6.25 TABLET ORAL DAILY
Refills: 0 | Status: DISCONTINUED | OUTPATIENT
Start: 2024-09-07 | End: 2024-09-07

## 2024-09-07 RX ORDER — AMLODIPINE BESYLATE 10 MG/1
10 TABLET ORAL DAILY
Refills: 0 | Status: DISCONTINUED | OUTPATIENT
Start: 2024-09-07 | End: 2024-09-07

## 2024-09-07 RX ORDER — VANCOMYCIN/0.9 % SOD CHLORIDE 1.75G/25
1250 PLASTIC BAG, INJECTION (ML) INTRAVENOUS ONCE
Refills: 0 | Status: COMPLETED | OUTPATIENT
Start: 2024-09-07 | End: 2024-09-07

## 2024-09-07 RX ORDER — PIPERACILLIN SODIUM AND TAZOBACTAM SODIUM 3; .375 G/15ML; G/15ML
3.38 INJECTION, POWDER, FOR SOLUTION INTRAVENOUS EVERY 6 HOURS
Refills: 0 | Status: DISCONTINUED | OUTPATIENT
Start: 2024-09-08 | End: 2024-09-08

## 2024-09-07 RX ORDER — CARVEDILOL 6.25 MG/1
1 TABLET ORAL
Refills: 0 | DISCHARGE

## 2024-09-07 RX ORDER — AMLODIPINE BESYLATE 10 MG/1
10 TABLET ORAL DAILY
Refills: 0 | Status: DISCONTINUED | OUTPATIENT
Start: 2024-09-08 | End: 2024-09-11

## 2024-09-07 RX ORDER — AMPICILLIN SODIUM AND SULBACTAM SODIUM 1; .5 G/1; G/1
3 INJECTION, POWDER, FOR SOLUTION INTRAMUSCULAR; INTRAVENOUS EVERY 6 HOURS
Refills: 0 | Status: DISCONTINUED | OUTPATIENT
Start: 2024-09-07 | End: 2024-09-07

## 2024-09-07 RX ORDER — FLUTICASONE PROPIONATE 50 UG/1
1 SPRAY, METERED NASAL
Refills: 0 | Status: DISCONTINUED | OUTPATIENT
Start: 2024-09-07 | End: 2024-09-11

## 2024-09-07 RX ORDER — POTASSIUM CHLORIDE 10 MEQ
10 TABLET, EXT RELEASE, PARTICLES/CRYSTALS ORAL DAILY
Refills: 0 | Status: DISCONTINUED | OUTPATIENT
Start: 2024-09-07 | End: 2024-09-07

## 2024-09-07 RX ORDER — CARVEDILOL 6.25 MG/1
3.12 TABLET ORAL EVERY 12 HOURS
Refills: 0 | Status: DISCONTINUED | OUTPATIENT
Start: 2024-09-07 | End: 2024-09-11

## 2024-09-07 RX ORDER — TRIAMTERENE/HYDROCHLOROTHIAZID 75 MG-50MG
1 TABLET ORAL DAILY
Refills: 0 | Status: DISCONTINUED | OUTPATIENT
Start: 2024-09-07 | End: 2024-09-07

## 2024-09-07 RX ADMIN — CARVEDILOL 6.25 MILLIGRAM(S): 6.25 TABLET ORAL at 05:43

## 2024-09-07 RX ADMIN — ACETAMINOPHEN 650 MILLIGRAM(S): 325 TABLET ORAL at 22:34

## 2024-09-07 RX ADMIN — AMPICILLIN SODIUM AND SULBACTAM SODIUM 200 GRAM(S): 1; .5 INJECTION, POWDER, FOR SOLUTION INTRAMUSCULAR; INTRAVENOUS at 11:09

## 2024-09-07 RX ADMIN — Medication 40 MILLIGRAM(S): at 05:43

## 2024-09-07 RX ADMIN — DEXTROSE, SODIUM ACETATE, POTASSIUM CHLORIDE, POTASSIUM PHOSPHATE, AND SODIUM CHLORIDE 100 MILLILITER(S): 5; .15; .13; .28; .091 INJECTION, SOLUTION INTRAVENOUS at 14:00

## 2024-09-07 RX ADMIN — AMLODIPINE BESYLATE 10 MILLIGRAM(S): 10 TABLET ORAL at 05:43

## 2024-09-07 RX ADMIN — FLUTICASONE PROPIONATE 1 SPRAY(S): 50 SPRAY, METERED NASAL at 05:44

## 2024-09-07 RX ADMIN — Medication 100 MILLIEQUIVALENT(S): at 09:47

## 2024-09-07 RX ADMIN — Medication 40 MILLIEQUIVALENT(S): at 09:47

## 2024-09-07 RX ADMIN — ONDANSETRON 4 MILLIGRAM(S): 2 INJECTION, SOLUTION INTRAMUSCULAR; INTRAVENOUS at 22:34

## 2024-09-07 RX ADMIN — MONTELUKAST SODIUM 10 MILLIGRAM(S): 5 TABLET, CHEWABLE ORAL at 11:09

## 2024-09-07 RX ADMIN — ESCITALOPRAM OXALATE 10 MILLIGRAM(S): 10 TABLET ORAL at 11:10

## 2024-09-07 RX ADMIN — FAMOTIDINE 20 MILLIGRAM(S): 10 INJECTION INTRAVENOUS at 11:09

## 2024-09-07 RX ADMIN — ACETAMINOPHEN 650 MILLIGRAM(S): 325 TABLET ORAL at 23:34

## 2024-09-07 RX ADMIN — ONDANSETRON 4 MILLIGRAM(S): 2 INJECTION, SOLUTION INTRAMUSCULAR; INTRAVENOUS at 13:18

## 2024-09-07 RX ADMIN — Medication 166.67 MILLIGRAM(S): at 22:24

## 2024-09-07 RX ADMIN — PIPERACILLIN SODIUM AND TAZOBACTAM SODIUM 200 GRAM(S): 3; .375 INJECTION, POWDER, FOR SOLUTION INTRAVENOUS at 11:49

## 2024-09-07 RX ADMIN — FLUTICASONE PROPIONATE 1 SPRAY(S): 50 SPRAY, METERED NASAL at 17:29

## 2024-09-07 RX ADMIN — PIPERACILLIN SODIUM AND TAZOBACTAM SODIUM 25 GRAM(S): 3; .375 INJECTION, POWDER, FOR SOLUTION INTRAVENOUS at 17:17

## 2024-09-07 RX ADMIN — PIPERACILLIN SODIUM AND TAZOBACTAM SODIUM 25 GRAM(S): 3; .375 INJECTION, POWDER, FOR SOLUTION INTRAVENOUS at 21:41

## 2024-09-07 RX ADMIN — Medication 40 MILLIEQUIVALENT(S): at 13:16

## 2024-09-07 RX ADMIN — CARVEDILOL 3.12 MILLIGRAM(S): 6.25 TABLET ORAL at 17:27

## 2024-09-07 RX ADMIN — ACETAMINOPHEN 650 MILLIGRAM(S): 325 TABLET ORAL at 07:58

## 2024-09-07 NOTE — DIETITIAN INITIAL EVALUATION ADULT - ENERGY INTAKE
Poor (<50%) In house, pt reports poor appetite and PO intake. No PO intake information available per flowsheets at this time.

## 2024-09-07 NOTE — DIETITIAN INITIAL EVALUATION ADULT - REASON INDICATOR FOR ASSESSMENT
Nutrition Consult for MST score 2 or > (unintentional wt loss).   Source: Pt, Electronic Medical Record.   Chart reviewed, events noted.

## 2024-09-07 NOTE — PROGRESS NOTE ADULT - PROBLEM SELECTOR PLAN 1
- GI PCR (-)  - ordered stool culture, calprotectin  - c/w Unasyn IV for now  - will hold off on anti-diarrheals until infectious causes are ruled out - GI PCR (-)  - ordered stool culture, calprotectin  - switched Unasyn to Zosyn IV   - f/u salmonella  - f/u shigella  - will hold off on anti-diarrheals until infectious causes are ruled out  - K 2.5 i/s/o diarrhea and poor po intake  - repleted k and started on 1/2 NS + 20 meq KCl 100cc/hr 24 hr

## 2024-09-07 NOTE — DIETITIAN INITIAL EVALUATION ADULT - PERTINENT LABORATORY DATA
09-07    134<L>  |  94<L>  |  18  ----------------------------<  110<H>  2.5<LL>   |  23  |  1.00    Ca    9.2      07 Sep 2024 08:25  Phos  1.9     09-07  Mg     2.2     09-07    TPro  7.0  /  Alb  3.6  /  TBili  0.5  /  DBili  x   /  AST  62<H>  /  ALT  73<H>  /  AlkPhos  106  09-07

## 2024-09-07 NOTE — DIETITIAN INITIAL EVALUATION ADULT - NSFNSGIIOFT_GEN_A_CORE
[facility] - Cardiology Stepdown  Cardiology  History and Physical     Patient Name: Jason Barros  MRN: [id number]  Admission Date: 7/28/2023  Code Status: Full Code   Attending Provider: Alex Dietz MD   Primary Care Physician: Kingsley Painting Jr, MD  Principal Problem:CAD, multiple vessel    Patient information was obtained from patient and ER records.     Subjective:     Chief Complaint:  CP     HPI:  Mr. Barros is a 71-year-old M w/ PMHx CAD s/p prior CABG (vein graft to distal RCA), ICM, HFrEF (LVEF 20%), s/p ICD,  HTN, type 2 who was transferred from [facility] for further viability study prior to CABG consideration after he presented with angina and found to have mvCAD. LHC done in 7.24.2 showed: Left main - 90%, Prox LAD-1 - 80%, mid lesion - 60%, Lcx - 90% w/ a widely patent RCA graft. TTE from 7/25 showed a severely reduced LVEF  of 25% and moderately to severely reduced right ventricular systolic function.    Patient has been off Plavix to allow for washout. Transferred on a heparin gtt. He has been chest pain free since his angiogram. Denies SOB, GOMEZ, orthopnea, N/V, or any complaints now.     PMH: PSVT, HTN, PAD, ICMO, systolic CHF, CAD, HLD, DM 2, bladder CA  PSH: colon resection, CABG, penile prosthesis   Family History: Father - CA; Mother - VHD  Social History: Current every day smoker. Denies alcohol or illicit drug use.    Previous Cardiac Diagnostics:   TTE (7.25.23):  The left ventricle is severely enlarged with severely decreased systolic function.  The estimated ejection fraction is 25%.  Grade I left ventricular diastolic dysfunction.  Mild to moderate tricuspid regurgitation.  Mild pulmonic regurgitation.  Mild mitral regurgitation.  Moderate right ventricular enlargement with moderately to severely reduced right ventricular systolic function.  Mild left atrial enlargement.  Intermediate central venous pressure (8 mmHg).  The estimated PA systolic pressure is 24 mmHg.     Carotid US  (7.25.23):  The right internal carotid artery demonstrated less than 50% stenosis.  The left internal carotid artery demonstrated less than 50% stenosis.  Bilateral vertebral arteries were patent with antegrade flow.     [facility] (7.24.23):  Left Main:     - Mid LM to Dist LM lesion was 90% stenosed. The lesion was calcified. The calcification amount was severe.  LAD:    - Prox LAD-1 lesion was 80% stenosed.    - Previously placed LAD-2 stent (unknown type) is widely patent.    - Mid LAD lesion was 60% stenosed.    - Previously placed Dist LAD stent (unknown type) is widely patent  Left Circumflex:    - Mid Cx lesion was 90% stenosed. The lesion was 100% stenosed. The lesion was previously treated using a stent of unknown type.  Saphenous Graft to Dist RCA    - Conduit type: SVG. The graft was visualized by angiography and was moderate in size. The graft was angiographically normal.      PET (6.23.23):  This is an abnormal perfusion study.   This scan is suggestive of low risk for future cardiovascular events.   Small fixed perfusion abnormality of mild intensity in the apical segment. Small fixed perfusion abnormality of mild intensity in the apical anterior segment.   The left ventricular cavity is noted to be moderately enlarged on the stress studies. The stress left ventricular ejection fraction was calculated to be 24% and left ventricular global function is severely reduced. The rest left ventricular cavity is noted to be mildly enlarged. The rest left ventricular ejection fraction was calculated to be 23% and rest left ventricular global function is severely reduced.   When compared to the resting ejection fraction (23%), the stress ejection fraction (24%) has increased.   The study quality is good.   There was a rise in myocardial blood flow between rest and stress.  Global myocardial blood flow reserve was 1.46.  Myocardial blood flow reserve is globally abnormal, placing the patient at a higher coronary event  risk.     TTE (5.9.23):  The study quality is average.   Global left ventricular systolic function is mildly decreased. The left ventricular ejection fraction is 40%.   Mild (1+) mitral regurgitation.  The pulmonary artery systolic pressure is 18 mmHg.           Past Medical History:   Diagnosis Date    Arthritis     Diabetes mellitus, type 2     Hypertension        Past Surgical History:   Procedure Laterality Date    LEFT HEART CATHETERIZATION Left 7/24/2023    Procedure: Left heart cath;  Surgeon: Gloria Donald MD;  Location: [facility] CATH LAB;  Service: Cardiology;  Laterality: Left;  [facility]       Review of patient's allergies indicates:   Allergen Reactions    Ranolazine Shortness Of Breath     groggy         Current Facility-Administered Medications on File Prior to Encounter   Medication    diazePAM tablet 10 mg    diphenhydrAMINE capsule 50 mg    [DISCONTINUED] 0.9%  NaCl infusion (for blood administration)    [DISCONTINUED] acetaminophen tablet 650 mg    [DISCONTINUED] aspirin EC tablet 81 mg    [DISCONTINUED] atorvastatin tablet 80 mg    [DISCONTINUED] carvediloL tablet 12.5 mg    [DISCONTINUED] furosemide tablet 40 mg    [DISCONTINUED] glucagon (human recombinant) injection 1 mg    [DISCONTINUED] glucose chewable tablet 16 g    [DISCONTINUED] glucose chewable tablet 24 g    [DISCONTINUED] heparin 25,000 units in dextrose 5% (100 units/ml) IV bolus from bag - ADDITIONAL PRN BOLUS - 30 units/kg (max bolus 4000 units)    [DISCONTINUED] heparin 25,000 units in dextrose 5% (100 units/ml) IV bolus from bag - ADDITIONAL PRN BOLUS - 60 units/kg (max bolus 4000 units)    [DISCONTINUED] heparin 25,000 units in dextrose 5% 250 mL (100 units/mL) infusion LOW INTENSITY nomogram - [facility]    [DISCONTINUED] hydrALAZINE injection 10 mg    [DISCONTINUED] HYDROcodone-acetaminophen 5-325 mg per tablet 1 tablet    [DISCONTINUED] insulin aspart U-100 injection 0-5 Units    [DISCONTINUED] morphine injection 2 mg     [DISCONTINUED] nitroGLYCERIN SL tablet 0.4 mg    [DISCONTINUED] ondansetron disintegrating tablet 8 mg    [DISCONTINUED] pantoprazole EC tablet 40 mg    [DISCONTINUED] perflutren lipid microspheres injection 1.3 mL     Current Outpatient Medications on File Prior to Encounter   Medication Sig    atorvastatin (LIPITOR) 80 MG tablet Take 80 mg by mouth.    carvediloL (COREG) 12.5 MG tablet Take 12.5 mg by mouth 2 (two) times daily.    clopidogreL (PLAVIX) 75 mg tablet Take 75 mg by mouth.    ferrous sulfate 324 mg (65 mg iron) TbEC Take 65 mg by mouth.    gabapentin (NEURONTIN) 400 MG capsule Take 400 mg by mouth every 8 (eight) hours.    metFORMIN (GLUCOPHAGE) 500 MG tablet Take 1,000 mg by mouth.    mometasone-formoterol (DULERA) 100-5 mcg/actuation HFAA Inhale into the lungs.    oxyCODONE (ROXICODONE) 15 MG Tab TAKE 1 TABLET BY MOUTH EVERY 8 TO 12 HOURS AS NEEDED FOR FOR PAIN    pantoprazole (PROTONIX) 40 MG tablet Take 40 mg by mouth.    solifenacin (VESICARE) 5 MG tablet Take 5 mg by mouth.     Family History    None       Tobacco Use    Smoking status: Every Day     Current packs/day: 1.00     Types: Cigarettes    Smokeless tobacco: Never   Substance and Sexual Activity    Alcohol use: Yes    Drug use: Never    Sexual activity: Not on file     Review of Systems   Constitutional: Negative for chills and fever.   HENT:  Negative for sore throat.    Cardiovascular:  Negative for chest pain, claudication, dyspnea on exertion, leg swelling, near-syncope, orthopnea and palpitations.   Respiratory:  Negative for cough.    Musculoskeletal:  Positive for joint pain.   Gastrointestinal:  Negative for nausea and vomiting.   Genitourinary:  Negative for dysuria and flank pain.   Neurological:  Negative for headaches and light-headedness.   Psychiatric/Behavioral:  Negative for substance abuse. The patient is not nervous/anxious.      Objective:     Vital Signs (Most Recent):  Temp: 98.4 °F (36.9 °C)  (07/29/23 0439)  Pulse: 88 (07/29/23 0439)  Resp: 18 (07/29/23 0439)  BP: (!) 140/62 (07/29/23 0439)  SpO2: (!) 94 % (07/29/23 0439) Vital Signs (24h Range):  Temp:  [97.9 °F (36.6 °C)-98.4 °F (36.9 °C)] 98.4 °F (36.9 °C)  Pulse:  [66-88] 88  Resp:  [16-20] 18  SpO2:  [94 %-97 %] 94 %  BP: (103-140)/(62-77) 140/62     Weight: 79.2 kg (174 lb 9.7 oz)  Body mass index is 29.06 kg/m².    SpO2: (!) 94 %       No intake or output data in the 24 hours ending 07/29/23 0638    Lines/Drains/Airways       Peripheral Intravenous Line  Duration                  Peripheral IV - Single Lumen 07/28/23 0430 20 G Anterior;Right Forearm 1 day                     Physical Exam  General: AAOx 3, in no acute distress.   HENT:      Head: Normocephalic.      Mouth/Throat:      Mouth: Mucous membranes are moist.      Pharynx: Oropharynx is clear.   Eyes:      Extraocular Movements: Extraocular movements intact.   Cardiovascular:      Rate and Rhythm: Normal rate and regular rhythm.   Pulmonary:      Effort: Pulmonary effort is normal. No respiratory distress.      Comments: On RA  Abdominal:      General: There is no distension.      Palpations: Abdomen is soft.      Tenderness: There is no abdominal tenderness. There is no guarding.   Musculoskeletal:         General: Normal range of motion.   Skin:     General: Skin is warm and dry.   Neurological:      General: No focal deficit present.      Mental Status: He is alert and oriented to person, place, and time. Mental status is at baseline.      Motor: No weakness.   Psychiatric:         Mood and Affect: Mood normal.         Behavior: Behavior normal.         Thought Content: Thought content normal.         Judgment: Judgment normal.      Significant Labs:   Recent Lab Results         07/28/23  2328   07/28/23  1604   07/28/23  1100   07/28/23  0836        Albumin 3.5             Alkaline Phosphatase 132             ALT 36             Anion Gap 12             aPTT 53.7  Comment: Refer to  local heparin nomogram for intensity/dose specific   therapeutic   range.               AST 32             Baso # 0.05             Basophil % 0.6             BILIRUBIN TOTAL 0.7  Comment: For infants and newborns, interpretation of results should be based  on gestational age, weight and in agreement with clinical  observations.    Premature Infant recommended reference ranges:  Up to 24 hours.............<8.0 mg/dL  Up to 48 hours............<12.0 mg/dL  3-5 days..................<15.0 mg/dL  6-29 days.................<15.0 mg/dL               BUN 13             Calcium 9.1             Chloride 101             CO2 24             Creatinine 0.9             Differential Method Automated             eGFR >60.0             Eos # 0.2             Eosinophil % 2.7             Glucose 92             Gran # (ANC) 4.8             Gran % 56.0             Hematocrit 37.3             Hemoglobin 12.1             Immature Grans (Abs) 0.03  Comment: Mild elevation in immature granulocytes is non specific and   can be seen in a variety of conditions including stress response,   acute inflammation, trauma and pregnancy. Correlation with other   laboratory and clinical findings is essential.               Immature Granulocytes 0.4             Lymph # 2.3             Lymph % 27.5             Magnesium 1.3             MCH 30.4             MCHC 32.4             MCV 94             Mono # 1.1             Mono % 12.8             MPV 10.6             nRBC 0             Phosphorus 3.3             Platelets 189             POCT Glucose     99         Potassium 3.8             PROTEIN TOTAL 7.3             PTT Heparin Monitor   98.0  Comment: For Minimal Heparin Infusion, the goal aPTT 64-85 seconds corresponds to an anti-Xa of 0.3-0.5.    For Low Intensity and High Intensity Heparin, the goal aPTT  seconds corresponds to an anti-Xa of 0.3-0.7     78.0  Comment: For Minimal Heparin Infusion, the goal aPTT 64-85 seconds corresponds to an  anti-Xa of 0.3-0.5.    For Low Intensity and High Intensity Heparin, the goal aPTT  seconds corresponds to an anti-Xa of 0.3-0.7       RBC 3.98             RDW 16.7             Sodium 137             WBC 8.50                       Assessment and Plan:     * CAD, multiple vessel  Mr. Barros is a 71-year-old M w/ PMHx CAD s/p prior CABG (vein graft to distal RCA), ICM, HFrEF (LVEF 20%), s/p ICD,  HTN, type 2 who was transferred from [facility] for further viability study prior to CABG consideration after he presented with angina and found to have mvCAD. LHC done in 7.24.2 showed: Left main - 90%, Prox LAD-1 - 80%, mid lesion - 60%, Lcx - 90% w/ a widely patent RCA graft. TTE from 7/25 showed a severely reduced LVEF of 25% and moderately to severely reduced right ventricular systolic function.    Plan:   - Will arrange for a viability study on Monday  - DAPT: continue ASA and plavix held to allow for washout in case patient is deemed a candidate for bypass after viability study.  - Cont heparin gtt- therapeutic  - Cont high intensity statin   - Cont coreg 12.5mg BID    Hyperlipidemia  -continue high intensity statin    Type 2 diabetes mellitus  a1C 5.9. ISS as needed    Ischemic cardiomyopathy  Please refer to assessment of mvCAD    Acute on chronic combined systolic and diastolic heart failure  TTE from 7/25 showed a severely reduced LVEF  of 25% and moderately to severely reduced right ventricular systolic function. LVEF in 5/9/2023 was noted to be 40%.     Plan:   -Continue coreg 12.5mg BID  - Holding rest of GDMT until final decision for cabg.   - strict I&Os and daily weights  - euvolemic, lasix as needed      VTE Risk Mitigation (From admission, onward)         Ordered     heparin 25,000 units in dextrose 5% (100 units/ml) IV bolus from bag - ADDITIONAL PRN BOLUS - 60 units/kg (max bolus 4000 units)  As needed (PRN)        Question:  Heparin Infusion Adjustment (DO NOT MODIFY ANSWER)  Answer:   [email or web address]\Images\Pharmacy\HeparinInfusions\heparin LOW INTENSITY nomogram for [facility] [id number]    07/28/23 2315     heparin 25,000 units in dextrose 5% (100 units/ml) IV bolus from bag - ADDITIONAL PRN BOLUS - 30 units/kg (max bolus 4000 units)  As needed (PRN)        Question:  Heparin Infusion Adjustment (DO NOT MODIFY ANSWER)  Answer:  \\[email or web address]\epic\Images\Pharmacy\HeparinInfusions\heparin LOW INTENSITY nomogram for [facility] [id number].pdf    07/28/23 2315     heparin 25,000 units in dextrose 5% 250 mL (100 units/mL) infusion LOW INTENSITY nomogram - [facility]  Continuous        Question Answer Comment   Heparin Infusion Adjustment (DO NOT MODIFY ANSWER) \\[email or web address]\epic\Images\Pharmacy\HeparinInfusions\heparin LOW INTENSITY nomogram for [facility] [id number]    Begin at (in units/kg/hr) 12        07/28/23 2315     Reason for no Mechanical VTE Prophylaxis  Once        Question:  Reasons:  Answer:  IV Heparin within 24 hrs. Pre or Post-Op    07/28/23 2313     IP VTE HIGH RISK PATIENT  Once         07/28/23 2313                Huma Young MD  Cardiology   [facility] - Cardiology Stepdown     Pt denies any current N/V; ordered for zofran. Pt reports ongoing loose stools; last BM 9/07 - reportedly loose. No current bowel regimen in place.

## 2024-09-07 NOTE — DIETITIAN INITIAL EVALUATION ADULT - OTHER INFO
- UBW: 195 pounds per pt; reports possible wt loss x <1 week in setting of GI distress.   - Dosing wt: 187.1 pounds (9/06)   	- Possible 8 lb (~4%) wt loss x <1 week - clinically significant.   - No new wts to assess at this time. Wt hx per [facility] HIE: 188 pounds (1/02/23).   - RD to continue to monitor weight trends as able.   - Nutritionally Pertinent Meds in-house: Abx, IVF, PPI.   - Nutritionally Pertinent Labs: Hyponatremia, Hypophosphatemia, Hypokalemia - ordered for KCl.   - GI: admit w/ ongoing diarrhea; Gastroenteritis.  - Neph: MONICA.

## 2024-09-07 NOTE — DIETITIAN INITIAL EVALUATION ADULT - NS FNS DIET ORDER
Diet, DASH/TLC:   Sodium & Cholesterol Restricted  Fiber/Residue Restricted (LOWFIBER)  Lactose Restricted (Milk Sugar Intoler.)  Nut Restricted  Egg Restricted  No Dairy  No Egg  No Lactose  No Nuts (09-07-24 @ 04:25)

## 2024-09-07 NOTE — DIETITIAN INITIAL EVALUATION ADULT - PROBLEM SELECTOR PLAN 1
- GI PCR (-)  - ordered stool culture, calprotectin  - c/w Unasyn IV for now  - will hold off on anti-diarrheals until infectious causes are ruled out

## 2024-09-07 NOTE — DIETITIAN INITIAL EVALUATION ADULT - PERTINENT MEDS FT
MEDICATIONS  (STANDING):  amLODIPine   Tablet 10 milliGRAM(s) Oral daily  carvedilol 6.25 milliGRAM(s) Oral daily  escitalopram 10 milliGRAM(s) Oral daily  famotidine    Tablet 20 milliGRAM(s) Oral daily  fluticasone propionate 50 MICROgram(s)/spray Nasal Spray 1 Spray(s) Both Nostrils two times a day  lactated ringers. 1000 milliLiter(s) (75 mL/Hr) IV Continuous <Continuous>  montelukast 10 milliGRAM(s) Oral daily  pantoprazole    Tablet 40 milliGRAM(s) Oral before breakfast  piperacillin/tazobactam IVPB.- 3.375 Gram(s) IV Intermittent once  piperacillin/tazobactam IVPB.- 3.375 Gram(s) IV Intermittent once  sodium chloride 0.45% with potassium chloride 20 mEq/L 1000 milliLiter(s) (100 mL/Hr) IV Continuous <Continuous>    MEDICATIONS  (PRN):  acetaminophen     Tablet .. 650 milliGRAM(s) Oral every 6 hours PRN Temp greater or equal to 38C (100.4F), Mild Pain (1 - 3)  albuterol    90 MICROgram(s) HFA Inhaler 2 Puff(s) Inhalation every 6 hours PRN Shortness of Breath and/or Wheezing  ondansetron Injectable 4 milliGRAM(s) IV Push every 8 hours PRN Nausea and/or Vomiting

## 2024-09-07 NOTE — DIETITIAN INITIAL EVALUATION ADULT - OTHER CALCULATIONS
Fluid needs deferred to team.  Estimated needs using dosing wt with consideration for Malnutrition factor.

## 2024-09-07 NOTE — PROGRESS NOTE ADULT - PROBLEM SELECTOR PLAN 2
- Cr 1.55 on admission (outpt records show Cr 0.6)  - likely pre-renal given dehydration and lack of PO intake  - c/w LR fluids  - obtain urine lytes, osmolality, urea, protein/Cr ratio  - monitor U/O

## 2024-09-07 NOTE — DIETITIAN INITIAL EVALUATION ADULT - ADD RECOMMEND
1) Recommend liberalize to Regular diet free of therapeutic restrictions as tolerated. Defer texture/consistency to SLP/team.   2) Recommend Multivitamin daily to prevent micronutrient deficiencies pending no medical contraindications.  3) Continue to monitor PO intake, weight, labs, skin, GI status, and diet.  4) Malnutrition sticker placed in chart.  1) Recommend liberalize to Regular diet free of therapeutic restrictions as tolerated. Defer texture/consistency to SLP/team.   2) Recommend Multivitamin daily to prevent micronutrient deficiencies pending no medical contraindications.  3) Continue to monitor PO intake, weight, labs, skin, GI status, and diet.  	- If feasible/warranted, consider adding Banatrol daily to aid in thickening stool PRN.   4) Malnutrition sticker placed in chart.

## 2024-09-07 NOTE — DIETITIAN INITIAL EVALUATION ADULT - PERSON TAUGHT/METHOD
Encouraged adequate consumption of meals to optimize protein-energy intake. Encouraged small/frequent meals, nutrient dense snacks, prioritizing protein foods at meal time. Pt made aware RD remains available PRN./verbal instruction/patient instructed

## 2024-09-07 NOTE — DIETITIAN NUTRITION RISK NOTIFICATION - TREATMENT: THE FOLLOWING DIET HAS BEEN RECOMMENDED
Diet, DASH/TLC:   Sodium & Cholesterol Restricted  Fiber/Residue Restricted (LOWFIBER)  Lactose Restricted (Milk Sugar Intoler.)  Nut Restricted  Egg Restricted  No Dairy  No Egg  No Lactose  No Nuts (09-07-24 @ 04:25) [Active]

## 2024-09-07 NOTE — DIETITIAN INITIAL EVALUATION ADULT - ORAL INTAKE PTA/DIET HISTORY
Pt reports having a "so-so" appetite and PO intake x ~5 days PTA in setting of ongoing N/V/diarrhea. Follows regular diet; no therapeutic restrictions reported. Pt confirms allergies to cucumber, squash, tree nuts, cinnamon, eggs, and lactose intolerance (but yogurt is okay). No micronutrient supplementation at home. Denies any difficulty chewing/swallowing at this time.

## 2024-09-08 LAB
ADD ON TEST-SPECIMEN IN LAB: SIGNIFICANT CHANGE UP
ANION GAP SERPL CALC-SCNC: 10 MMOL/L — SIGNIFICANT CHANGE UP (ref 5–17)
BUN SERPL-MCNC: 8 MG/DL — SIGNIFICANT CHANGE UP (ref 7–23)
C DIFF GDH STL QL: NEGATIVE — SIGNIFICANT CHANGE UP
C DIFF GDH STL QL: SIGNIFICANT CHANGE UP
CALCIUM SERPL-MCNC: 9 MG/DL — SIGNIFICANT CHANGE UP (ref 8.4–10.5)
CALCIUM UR-MCNC: <1 MG/DL — SIGNIFICANT CHANGE UP
CHLORIDE SERPL-SCNC: 101 MMOL/L — SIGNIFICANT CHANGE UP (ref 96–108)
CO2 SERPL-SCNC: 25 MMOL/L — SIGNIFICANT CHANGE UP (ref 22–31)
CREAT SERPL-MCNC: 0.81 MG/DL — SIGNIFICANT CHANGE UP (ref 0.5–1.3)
EGFR: 84 ML/MIN/1.73M2 — SIGNIFICANT CHANGE UP
GLUCOSE SERPL-MCNC: 144 MG/DL — HIGH (ref 70–99)
GRAM STN FLD: ABNORMAL
HCT VFR BLD CALC: 34.6 % — SIGNIFICANT CHANGE UP (ref 34.5–45)
HGB BLD-MCNC: 11.6 G/DL — SIGNIFICANT CHANGE UP (ref 11.5–15.5)
MAGNESIUM SERPL-MCNC: 2.4 MG/DL — SIGNIFICANT CHANGE UP (ref 1.6–2.6)
MCHC RBC-ENTMCNC: 29.2 PG — SIGNIFICANT CHANGE UP (ref 27–34)
MCHC RBC-ENTMCNC: 33.5 GM/DL — SIGNIFICANT CHANGE UP (ref 32–36)
MCV RBC AUTO: 87.2 FL — SIGNIFICANT CHANGE UP (ref 80–100)
NRBC # BLD: 0 /100 WBCS — SIGNIFICANT CHANGE UP (ref 0–0)
PHOSPHATE 24H UR-MCNC: <5 MG/DL — SIGNIFICANT CHANGE UP
PHOSPHATE SERPL-MCNC: 1.8 MG/DL — LOW (ref 2.5–4.5)
PLATELET # BLD AUTO: 266 K/UL — SIGNIFICANT CHANGE UP (ref 150–400)
POTASSIUM SERPL-MCNC: 3.2 MMOL/L — LOW (ref 3.5–5.3)
POTASSIUM SERPL-SCNC: 3.2 MMOL/L — LOW (ref 3.5–5.3)
RBC # BLD: 3.97 M/UL — SIGNIFICANT CHANGE UP (ref 3.8–5.2)
RBC # FLD: 13.8 % — SIGNIFICANT CHANGE UP (ref 10.3–14.5)
SODIUM SERPL-SCNC: 136 MMOL/L — SIGNIFICANT CHANGE UP (ref 135–145)
UUN UR-MCNC: 243 MG/DL — SIGNIFICANT CHANGE UP
WBC # BLD: 8.81 K/UL — SIGNIFICANT CHANGE UP (ref 3.8–10.5)
WBC # FLD AUTO: 8.81 K/UL — SIGNIFICANT CHANGE UP (ref 3.8–10.5)

## 2024-09-08 PROCEDURE — 99221 1ST HOSP IP/OBS SF/LOW 40: CPT

## 2024-09-08 PROCEDURE — 99222 1ST HOSP IP/OBS MODERATE 55: CPT

## 2024-09-08 PROCEDURE — 99233 SBSQ HOSP IP/OBS HIGH 50: CPT

## 2024-09-08 RX ORDER — METRONIDAZOLE 250 MG
TABLET ORAL
Refills: 0 | Status: DISCONTINUED | OUTPATIENT
Start: 2024-09-08 | End: 2024-09-11

## 2024-09-08 RX ORDER — MAGNESIUM, ALUMINUM HYDROXIDE 200-225/5
30 SUSPENSION, ORAL (FINAL DOSE FORM) ORAL ONCE
Refills: 0 | Status: COMPLETED | OUTPATIENT
Start: 2024-09-08 | End: 2024-09-08

## 2024-09-08 RX ORDER — POTASSIUM PHOSPHATE 236; 224 MG/ML; MG/ML
30 INJECTION, SOLUTION INTRAVENOUS ONCE
Refills: 0 | Status: COMPLETED | OUTPATIENT
Start: 2024-09-08 | End: 2024-09-08

## 2024-09-08 RX ORDER — DEXTROSE, SODIUM ACETATE, POTASSIUM CHLORIDE, POTASSIUM PHOSPHATE, AND SODIUM CHLORIDE 5; .15; .13; .28; .091 G/100ML; G/100ML; G/100ML; G/100ML; G/100ML
1000 INJECTION, SOLUTION INTRAVENOUS
Refills: 0 | Status: DISCONTINUED | OUTPATIENT
Start: 2024-09-08 | End: 2024-09-09

## 2024-09-08 RX ORDER — METRONIDAZOLE 250 MG
500 TABLET ORAL ONCE
Refills: 0 | Status: COMPLETED | OUTPATIENT
Start: 2024-09-08 | End: 2024-09-08

## 2024-09-08 RX ORDER — METRONIDAZOLE 250 MG
500 TABLET ORAL EVERY 8 HOURS
Refills: 0 | Status: DISCONTINUED | OUTPATIENT
Start: 2024-09-08 | End: 2024-09-11

## 2024-09-08 RX ORDER — VANCOMYCIN/0.9 % SOD CHLORIDE 1.75G/25
1250 PLASTIC BAG, INJECTION (ML) INTRAVENOUS EVERY 12 HOURS
Refills: 0 | Status: DISCONTINUED | OUTPATIENT
Start: 2024-09-08 | End: 2024-09-08

## 2024-09-08 RX ADMIN — MONTELUKAST SODIUM 10 MILLIGRAM(S): 5 TABLET, CHEWABLE ORAL at 11:51

## 2024-09-08 RX ADMIN — CARVEDILOL 3.12 MILLIGRAM(S): 6.25 TABLET ORAL at 05:38

## 2024-09-08 RX ADMIN — Medication 166.67 MILLIGRAM(S): at 05:39

## 2024-09-08 RX ADMIN — ESCITALOPRAM OXALATE 10 MILLIGRAM(S): 10 TABLET ORAL at 11:51

## 2024-09-08 RX ADMIN — ONDANSETRON 4 MILLIGRAM(S): 2 INJECTION, SOLUTION INTRAMUSCULAR; INTRAVENOUS at 17:21

## 2024-09-08 RX ADMIN — PIPERACILLIN SODIUM AND TAZOBACTAM SODIUM 25 GRAM(S): 3; .375 INJECTION, POWDER, FOR SOLUTION INTRAVENOUS at 02:15

## 2024-09-08 RX ADMIN — FLUTICASONE PROPIONATE 1 SPRAY(S): 50 SPRAY, METERED NASAL at 05:38

## 2024-09-08 RX ADMIN — CARVEDILOL 3.12 MILLIGRAM(S): 6.25 TABLET ORAL at 17:12

## 2024-09-08 RX ADMIN — Medication 100 MILLIGRAM(S): at 21:11

## 2024-09-08 RX ADMIN — ACETAMINOPHEN 650 MILLIGRAM(S): 325 TABLET ORAL at 23:06

## 2024-09-08 RX ADMIN — POTASSIUM PHOSPHATE 83.33 MILLIMOLE(S): 236; 224 INJECTION, SOLUTION INTRAVENOUS at 11:48

## 2024-09-08 RX ADMIN — Medication 30 MILLILITER(S): at 21:11

## 2024-09-08 RX ADMIN — AMLODIPINE BESYLATE 10 MILLIGRAM(S): 10 TABLET ORAL at 05:38

## 2024-09-08 RX ADMIN — PIPERACILLIN SODIUM AND TAZOBACTAM SODIUM 25 GRAM(S): 3; .375 INJECTION, POWDER, FOR SOLUTION INTRAVENOUS at 06:43

## 2024-09-08 RX ADMIN — PIPERACILLIN SODIUM AND TAZOBACTAM SODIUM 25 GRAM(S): 3; .375 INJECTION, POWDER, FOR SOLUTION INTRAVENOUS at 11:50

## 2024-09-08 RX ADMIN — FAMOTIDINE 20 MILLIGRAM(S): 10 INJECTION INTRAVENOUS at 11:51

## 2024-09-08 RX ADMIN — DEXTROSE, SODIUM ACETATE, POTASSIUM CHLORIDE, POTASSIUM PHOSPHATE, AND SODIUM CHLORIDE 100 MILLILITER(S): 5; .15; .13; .28; .091 INJECTION, SOLUTION INTRAVENOUS at 00:08

## 2024-09-08 RX ADMIN — Medication 100 MILLIGRAM(S): at 17:12

## 2024-09-08 RX ADMIN — DEXTROSE, SODIUM ACETATE, POTASSIUM CHLORIDE, POTASSIUM PHOSPHATE, AND SODIUM CHLORIDE 100 MILLILITER(S): 5; .15; .13; .28; .091 INJECTION, SOLUTION INTRAVENOUS at 13:02

## 2024-09-08 RX ADMIN — Medication 40 MILLIGRAM(S): at 05:38

## 2024-09-08 RX ADMIN — FLUTICASONE PROPIONATE 1 SPRAY(S): 50 SPRAY, METERED NASAL at 17:12

## 2024-09-08 RX ADMIN — ACETAMINOPHEN 650 MILLIGRAM(S): 325 TABLET ORAL at 21:11

## 2024-09-08 NOTE — PROGRESS NOTE ADULT - PROBLEM SELECTOR PLAN 1
- GI PCR (-)  - ordered stool culture, calprotectin  - switched Unasyn to Zosyn IV   - f/u salmonella  - f/u shigella  - will hold off on anti-diarrheals until infectious causes are ruled out  - K 2.5 i/s/o diarrhea and poor po intake  - repleted k and started on 1/2 NS + 20 meq KCl 100cc/hr 24 hr

## 2024-09-08 NOTE — CONSULT NOTE ADULT - SUBJECTIVE AND OBJECTIVE BOX
Patient is a 58y old  Female who presents with a chief complaint of gastroenteritis (08 Sep 2024 07:21)    HPI:  Pt is 58 y.o. F w/ PMH of HTN, asthma, and GERD who presents for acute non-bloody diarrhea a/w nausea and non-bloody, non-bilious vomiting. 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 She had a planned GI appt next week for chronic constant RUQ pain that has been occurring for the past few months and feelings of distension in her epigastric region, but after having these episodes of diarrhea, pt had a video appt with her GI doctor who recommended calling EMS. She describes the diarrhea as watery, non-bloody, odorous, dark brown, and nonstop. She states that it had been occurring every 30-40 min and sometimes happens so quickly that she is not able to make it to the bathroom in time. She has also had body pains, headaches, dizziness, subjective fevers/chills - she's not sure how high her fevers have been as she doesn't have a thermometer and has tried taking Tylenol but they were only relieved with Motrin. She has not been able to eat and has only been drinking fluids since the episodes first started. She endorsed a 12-lb weight loss in the past few days and depression because she left her dogs alone at home but denied any chest pain, SOB, urinary symptoms, numbness/tingling. Her last colonoscopy was 4 years ago- only diverticulosis was found.    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 Prior to the onset of diarrhea, during the last week, pt did home visits on 8/26-8/28, worked remotely on 8/29, attended a fundraising event on 8/30 where she ate Chinese food including chicken and beef, and a BBQ on 8/31 where she ate [place] food, potatoes, Russian salad, chicken, and energy shakes. She doesn't recall what she had 2 days prior to the onset of diarrhea and denied any sick contacts. She recently had covid three weeks ago and states that after she wasn't getting better, she was started on prednisone and azithromycin at [facility].     In the ED, pt was HDS, afebrile. U/A (-). CTAP showed hepatic steatosis and pancolitis. Id consulted for pancolitis     PAST MEDICAL & SURGICAL HISTORY:  HTN (hypertension)      Menorrhagia      GERD (gastroesophageal reflux disease)      Hiatal hernia      Asthma  uses albuterol PRN , stable      Seasonal allergies      Eczema      History of tonsillectomy  and adenoidectomy at age 5      History of ankle surgery      History of myomectomy      S/P cholecystectomy          Allergies  cinnamon (Unknown)  Cucumbers,  Squash (all varieties) (Pruritus; Urticaria)  Tree Nuts (Short breath)  eggs (Rash)  shea butter (Unknown)  aspirin (Other)  [place] (Unknown)  Cashews (Unknown)  latex (Pruritus; Rash)    ANTIMICROBIALS (past 90 days)  MEDICATIONS  (STANDING):  ampicillin/sulbactam  IVPB   200 mL/Hr IV Intermittent (09-07-24 @ 11:09)    ampicillin/sulbactam  IVPB   200 mL/Hr IV Intermittent (09-06-24 @ 18:48)    piperacillin/tazobactam IVPB.   200 mL/Hr IV Intermittent (09-07-24 @ 11:49)    piperacillin/tazobactam IVPB.-   25 mL/Hr IV Intermittent (09-07-24 @ 17:17)    piperacillin/tazobactam IVPB.-   25 mL/Hr IV Intermittent (09-07-24 @ 21:41)    piperacillin/tazobactam IVPB.-   25 mL/Hr IV Intermittent (09-08-24 @ 02:15)    piperacillin/tazobactam IVPB..   25 mL/Hr IV Intermittent (09-08-24 @ 11:50)   25 mL/Hr IV Intermittent (09-08-24 @ 06:43)    vancomycin  IVPB   166.67 mL/Hr IV Intermittent (09-07-24 @ 22:24)    vancomycin  IVPB   166.67 mL/Hr IV Intermittent (09-08-24 @ 05:39)        piperacillin/tazobactam IVPB.. 3.375 every 6 hours  vancomycin  IVPB 1250 every 12 hours    MEDICATIONS  (STANDING):  acetaminophen     Tablet .. 650 every 6 hours PRN  albuterol    90 MICROgram(s) HFA Inhaler 2 every 6 hours PRN  amLODIPine   Tablet 10 daily  carvedilol 3.125 every 12 hours  escitalopram 10 daily  famotidine    Tablet 20 daily  montelukast 10 daily  ondansetron Injectable 4 every 8 hours PRN  pantoprazole    Tablet 40 before breakfast    SOCIAL HISTORY:       FAMILY HISTORY:  Family history of diabetes mellitus (Mother)    Family history of hypertension    Family history of hyperlipidemia    Family history of stomach cancer (Uncle)      REVIEW OF SYSTEMS  [  ] ROS unobtainable because:    [  ] All other systems negative except as noted below:	    Constitutional:  [ ] fever [ ] chills  [ ] weight loss  [ ] weakness  Skin:  [ ] rash [ ] phlebitis	  Eyes: [ ] icterus [ ] pain  [ ] discharge	  ENMT: [ ] sore throat  [ ] thrush [ ] ulcers [ ] exudates  Respiratory: [ ] dyspnea [ ] hemoptysis [ ] cough [ ] sputum	  Cardiovascular:  [ ] chest pain [ ] palpitations [ ] edema	  Gastrointestinal:  [ ] nausea [ ] vomiting [ ] diarrhea [ ] constipation [ ] pain	  Genitourinary:  [ ] dysuria [ ] frequency [ ] hematuria [ ] discharge [ ] flank pain  [ ] incontinence  Musculoskeletal:  [ ] myalgias [ ] arthralgias [ ] arthritis  [ ] back pain  Neurological:  [ ] headache [ ] seizures  [ ] confusion/altered mental status  Psychiatric:  [ ] anxiety [ ] depression	  Hematology/Lymphatics:  [ ] lymphadenopathy  Endocrine:  [ ] adrenal [ ] thyroid  Allergic/Immunologic:	 [ ] transplant [ ] seasonal    Vital Signs Last 24 Hrs  T(F): 97.5 (09-07-24 @ 20:08), Max: 100.1 (09-06-24 @ 22:27)  Vital Signs Last 24 Hrs  HR: 75 (09-08-24 @ 05:33) (73 - 75)  BP: 115/76 (09-08-24 @ 05:33) (114/77 - 126/76)  RR: 18 (09-07-24 @ 20:08)  SpO2: 95% (09-07-24 @ 20:08) (94% - 95%)  Wt(kg): --    PHYSICAL EXAM:  Constitutional: non-toxic, no distress  HEAD/EYES: anicteric, no conjunctival injection  ENT:  supple, no thrush  Cardiovascular:   normal S1, S2, no murmur, no edema  Respiratory:  clear BS bilaterally, no wheezes, no rales  GI:  soft, non-tender, normal bowel sounds  :  no kennedy, no CVA tenderness  Musculoskeletal:  no synovitis, normal ROM  Neurologic: awake and alert, normal strength, no focal findings  Skin:  no rash, no erythema, no phlebitis  Heme/Onc: no lymphadenopathy   Psychiatric:  awake, alert, appropriate mood                            11.6   8.81  )-----------( 266      ( 08 Sep 2024 07:57 )             34.6   09-08    136  |  101  |  8   ----------------------------<  144<H>  3.2<L>   |  25  |  0.81    Ca    9.0      08 Sep 2024 07:57  Phos  1.8     09-08  Mg     2.4     09-08    TPro  7.0  /  Alb  3.6  /  TBili  0.5  /  DBili  x   /  AST  62<H>  /  ALT  73<H>  /  AlkPhos  106  09-07    Urinalysis Basic - ( 08 Sep 2024 07:57 )    Color: x / Appearance: x / SG: x / pH: x  Gluc: 144 mg/dL / Ketone: x  / Bili: x / Urobili: x   Blood: x / Protein: x / Nitrite: x   Leuk Esterase: x / RBC: x / WBC x   Sq Epi: x / Non Sq Epi: x / Bacteria: x    MICROBIOLOGY:  Culture - Blood (collected 06 Sep 2024 16:00)  Source: .Blood Blood-Peripheral  Preliminary Report (07 Sep 2024 20:01):    No growth at 24 hours    Culture - Urine (collected 06 Sep 2024 15:42)  Source: Clean Catch Clean Catch (Midstream)  Preliminary Report (07 Sep 2024 15:37):    10,000 - 49,000 CFU/mL Gram Positive Cocci in Pairs and Chains    <10,000 CFU/ml Normal Urogenital luis present    Culture - Blood (collected 06 Sep 2024 15:00)  Source: .Blood Blood-Peripheral  Gram Stain (08 Sep 2024 06:23):    Growth in aerobic bottle: Gram Positive Cocci in Clusters    Growth in anaerobic bottle: Gram Positive Cocci in Clusters  Preliminary Report (08 Sep 2024 10:03):    Growth in aerobic bottle: Staphylococcus warneri    Isolation of Coagulase negative Staphylococcus from single blood culture    sets may represent    contamination. Contact the Microbiology Department at [phone] if    susceptibility testing is    clinically indicated.    Direct identification is available within approximately 3-5    hours either by Blood Panel Multiplexed PCR or Direct    MALDI-TOF. Details: [email or web address]    Growth in anaerobic bottle: Gram Positive Cocci in Clusters  Organism: Blood Culture PCR (07 Sep 2024 20:15)  Organism: Blood Culture PCR (07 Sep 2024 20:15)      Method Type: PCR      -  Coagulase negative Staphylococcus: Detec                  RADIOLOGY:  imaging below personally reviewed and agree with findings    < from: CT Abdomen and Pelvis w/ IV Cont (09.06.24 @ 17:48) >    IMPRESSION: Pancolitis.    < end of copied text >   Patient is a 58y old  Female who presents with a chief complaint of gastroenteritis (08 Sep 2024 07:21)    HPI:  Pt is 58 y.o. F w/ PMH of HTN, asthma, and GERD who presents for acute non-bloody diarrhea a/w nausea and non-bloody, non-bilious vomiting. 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 She had a planned GI appt next week for chronic constant RUQ pain that has been occurring for the past few months and feelings of distension in her epigastric region, but after having these episodes of diarrhea, pt had a video appt with her GI doctor who recommended calling EMS. She describes the diarrhea as watery, non-bloody, odorous, dark brown, and nonstop. She states that it had been occurring every 30-40 min and sometimes happens so quickly that she is not able to make it to the bathroom in time. She has also had body pains, headaches, dizziness, subjective fevers/chills - she's not sure how high her fevers have been as she doesn't have a thermometer and has tried taking Tylenol but they were only relieved with Motrin. She has not been able to eat and has only been drinking fluids since the episodes first started. She endorsed a 12-lb weight loss in the past few days and depression because she left her dogs alone at home but denied any chest pain, SOB, urinary symptoms, numbness/tingling. Her last colonoscopy was 4 years ago- only diverticulosis was found.    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 Prior to the onset of diarrhea, during the last week, pt did home visits on 8/26-8/28, worked remotely on 8/29, attended a fundraising event on 8/30 where she ate Chinese food including chicken and beef, and a BBQ on 8/31 where she ate [place] food, potatoes, Russian salad, chicken, and energy shakes. She doesn't recall what she had 2 days prior to the onset of diarrhea and denied any sick contacts. She recently had covid three weeks ago and states that after she wasn't getting better, she was started on prednisone and azithromycin at [facility]gent [facility].     In the ED, pt was HDS, afebrile. U/A (-). CTAP showed hepatic steatosis and pancolitis. Id consulted for pancolitis     PAST MEDICAL & SURGICAL HISTORY:  HTN (hypertension)  Menorrhagia  GERD (gastroesophageal reflux disease)  Hiatal hernia  Asthma  uses albuterol PRN , stable  Seasonal allergies  Eczema  History of tonsillectomy  and adenoidectomy at age 5  History of ankle surgery  History of myomectomy  S/P cholecystectomy          Allergies  cinnamon (Unknown)  Cucumbers,  Squash (all varieties) (Pruritus; Urticaria)  Tree Nuts (Short breath)  eggs (Rash)  shea butter (Unknown)  aspirin (Other)  [place] (Unknown)  Cashews (Unknown)  latex (Pruritus; Rash)    ANTIMICROBIALS (past 90 days)  MEDICATIONS  (STANDING):  ampicillin/sulbactam  IVPB   200 mL/Hr IV Intermittent (09-07-24 @ 11:09)    ampicillin/sulbactam  IVPB   200 mL/Hr IV Intermittent (09-06-24 @ 18:48)    piperacillin/tazobactam IVPB.   200 mL/Hr IV Intermittent (09-07-24 @ 11:49)    piperacillin/tazobactam IVPB.-   25 mL/Hr IV Intermittent (09-07-24 @ 17:17)    piperacillin/tazobactam IVPB.-   25 mL/Hr IV Intermittent (09-07-24 @ 21:41)    piperacillin/tazobactam IVPB.-   25 mL/Hr IV Intermittent (09-08-24 @ 02:15)    piperacillin/tazobactam IVPB..   25 mL/Hr IV Intermittent (09-08-24 @ 11:50)   25 mL/Hr IV Intermittent (09-08-24 @ 06:43)    vancomycin  IVPB   166.67 mL/Hr IV Intermittent (09-07-24 @ 22:24)    vancomycin  IVPB   166.67 mL/Hr IV Intermittent (09-08-24 @ 05:39)        piperacillin/tazobactam IVPB.. 3.375 every 6 hours  vancomycin  IVPB 1250 every 12 hours    MEDICATIONS  (STANDING):  acetaminophen     Tablet .. 650 every 6 hours PRN  albuterol    90 MICROgram(s) HFA Inhaler 2 every 6 hours PRN  amLODIPine   Tablet 10 daily  carvedilol 3.125 every 12 hours  escitalopram 10 daily  famotidine    Tablet 20 daily  montelukast 10 daily  ondansetron Injectable 4 every 8 hours PRN  pantoprazole    Tablet 40 before breakfast    SOCIAL HISTORY: Denies any smoking or alcohol use. No recent travel or sick contacts     FAMILY HISTORY:  Family history of diabetes mellitus (Mother)    Family history of hypertension    Family history of hyperlipidemia    Family history of stomach cancer (Uncle)    REVIEW OF SYSTEMS:    CONSTITUTIONAL: No weakness. +subjective fevers and chills  EYES:  No visual changes, no eye pain   ENT: No vertigo or throat pain   NECK: No pain or stiffness  RESPIRATORY: No cough, wheezing, hemoptysis; No shortness of breath  CARDIOVASCULAR: No chest pain or palpitations  GASTROINTESTINAL: No abdominal or epigastric pain. + nausea and vomiting; + diarrhea. No melena or hematochezia.  GENITOURINARY: No dysuria, frequency or hematuria  NEUROLOGICAL: No numbness or weakness  SKIN: No itching, rashes  Psych: no anxiety or depression     Vital Signs Last 24 Hrs  T(F): 97.5 (09-07-24 @ 20:08), Max: 100.1 (09-06-24 @ 22:27)  Vital Signs Last 24 Hrs  HR: 75 (09-08-24 @ 05:33) (73 - 75)  BP: 115/76 (09-08-24 @ 05:33) (114/77 - 126/76)  RR: 18 (09-07-24 @ 20:08)  SpO2: 95% (09-07-24 @ 20:08) (94% - 95%)  Wt(kg): --    PHYSICAL EXAM:  Constitutional: non-toxic, no distress  HEAD/EYES: anicteric, no conjunctival injection  ENT:  supple, no thrush  Cardiovascular:   normal S1, S2, no murmur, no edema  Respiratory:  clear BS bilaterally, no wheezes, no rales  GI:  soft, non-tender, normal bowel sounds  :  no kennedy, no CVA tenderness  Musculoskeletal:  no synovitis, normal ROM  Neurologic: awake and alert, normal strength, no focal findings  Skin:  no rash, no erythema, no phlebitis  Heme/Onc: no lymphadenopathy   Psychiatric:  awake, alert, appropriate mood                            11.6   8.81  )-----------( 266      ( 08 Sep 2024 07:57 )             34.6   09-08    136  |  101  |  8   ----------------------------<  144<H>  3.2<L>   |  25  |  0.81    Ca    9.0      08 Sep 2024 07:57  Phos  1.8     09-08  Mg     2.4     09-08    TPro  7.0  /  Alb  3.6  /  TBili  0.5  /  DBili  x   /  AST  62<H>  /  ALT  73<H>  /  AlkPhos  106  09-07    Urinalysis Basic - ( 08 Sep 2024 07:57 )    Color: x / Appearance: x / SG: x / pH: x  Gluc: 144 mg/dL / Ketone: x  / Bili: x / Urobili: x   Blood: x / Protein: x / Nitrite: x   Leuk Esterase: x / RBC: x / WBC x   Sq Epi: x / Non Sq Epi: x / Bacteria: x    MICROBIOLOGY:  Culture - Blood (collected 06 Sep 2024 16:00)  Source: .Blood Blood-Peripheral  Preliminary Report (07 Sep 2024 20:01):    No growth at 24 hours    Culture - Urine (collected 06 Sep 2024 15:42)  Source: Clean Catch Clean Catch (Midstream)  Preliminary Report (07 Sep 2024 15:37):    10,000 - 49,000 CFU/mL Gram Positive Cocci in Pairs and Chains    <10,000 CFU/ml Normal Urogenital luis present    Culture - Blood (collected 06 Sep 2024 15:00)  Source: .Blood Blood-Peripheral  Gram Stain (08 Sep 2024 06:23):    Growth in aerobic bottle: Gram Positive Cocci in Clusters    Growth in anaerobic bottle: Gram Positive Cocci in Clusters  Preliminary Report (08 Sep 2024 10:03):    Growth in aerobic bottle: Staphylococcus warneri    Isolation of Coagulase negative Staphylococcus from single blood culture    sets may represent    contamination. Contact the Microbiology Department at [phone] if    susceptibility testing is    clinically indicated.    Direct identification is available within approximately 3-5    hours either by Blood Panel Multiplexed PCR or Direct    MALDI-TOF. Details: [email or web address]    Growth in anaerobic bottle: Gram Positive Cocci in Clusters  Organism: Blood Culture PCR (07 Sep 2024 20:15)  Organism: Blood Culture PCR (07 Sep 2024 20:15)      Method Type: PCR      -  Coagulase negative Staphylococcus: Detec                  RADIOLOGY:  imaging below personally reviewed and agree with findings    < from: CT Abdomen and Pelvis w/ IV Cont (09.06.24 @ 17:48) >    IMPRESSION: Pancolitis.    < end of copied text >   Patient is a 58y old  Female who presents with a chief complaint of gastroenteritis (08 Sep 2024 07:21)    HPI:  Pt is 58 y.o. F w/ PMH of HTN, asthma, and GERD who presents for acute non-bloody diarrhea a/w nausea and non-bloody, non-bilious vomiting. 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 She had a planned GI appt next week for chronic constant RUQ pain that has been occurring for the past few months and feelings of distension in her epigastric region, but after having these episodes of diarrhea, pt had a video appt with her GI doctor who recommended calling EMS. She describes the diarrhea as watery, non-bloody, odorous, dark brown, and nonstop. She states that it had been occurring every 30-40 min and sometimes happens so quickly that she is not able to make it to the bathroom in time. She has also had body pains, headaches, dizziness, subjective fevers/chills - she's not sure how high her fevers have been as she doesn't have a thermometer and has tried taking Tylenol but they were only relieved with Motrin. She has not been able to eat and has only been drinking fluids since the episodes first started. She endorsed a 12-lb weight loss in the past few days and depression because she left her dogs alone at home but denied any chest pain, SOB, urinary symptoms, numbness/tingling. Her last colonoscopy was 4 years ago- only diverticulosis was found.    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 Prior to the onset of diarrhea, during the last week, pt did home visits on 8/26-8/28, worked remotely on 8/29, attended a fundraising event on 8/30 where she ate Chinese food including chicken and beef, and a BBQ on 8/31 where she ate [place] food, potatoes, Russian salad, chicken, and energy shakes. She doesn't recall what she had 2 days prior to the onset of diarrhea and denied any sick contacts. She recently had covid three weeks ago and states that after she wasn't getting better, she was started on prednisone and azithromycin at Urgent Care.     In the ED, pt was HDS, afebrile. U/A (-). CTAP showed hepatic steatosis and pancolitis. Id consulted for pancolitis     PAST MEDICAL & SURGICAL HISTORY:  HTN (hypertension)  Menorrhagia  GERD (gastroesophageal reflux disease)  Hiatal hernia  Asthma  uses albuterol PRN , stable  Seasonal allergies  Eczema  History of tonsillectomy  and adenoidectomy at age 5  History of ankle surgery  History of myomectomy  S/P cholecystectomy          Allergies  cinnamon (Unknown)  Cucumbers,  Squash (all varieties) (Pruritus; Urticaria)  Tree Nuts (Short breath)  eggs (Rash)  shea butter (Unknown)  aspirin (Other)  [place] (Unknown)  Cashews (Unknown)  latex (Pruritus; Rash)    ANTIMICROBIALS (past 90 days)  MEDICATIONS  (STANDING):  ampicillin/sulbactam  IVPB   200 mL/Hr IV Intermittent (09-07-24 @ 11:09)    ampicillin/sulbactam  IVPB   200 mL/Hr IV Intermittent (09-06-24 @ 18:48)    piperacillin/tazobactam IVPB.   200 mL/Hr IV Intermittent (09-07-24 @ 11:49)    piperacillin/tazobactam IVPB.-   25 mL/Hr IV Intermittent (09-07-24 @ 17:17)    piperacillin/tazobactam IVPB.-   25 mL/Hr IV Intermittent (09-07-24 @ 21:41)    piperacillin/tazobactam IVPB.-   25 mL/Hr IV Intermittent (09-08-24 @ 02:15)    piperacillin/tazobactam IVPB..   25 mL/Hr IV Intermittent (09-08-24 @ 11:50)   25 mL/Hr IV Intermittent (09-08-24 @ 06:43)    vancomycin  IVPB   166.67 mL/Hr IV Intermittent (09-07-24 @ 22:24)    vancomycin  IVPB   166.67 mL/Hr IV Intermittent (09-08-24 @ 05:39)        piperacillin/tazobactam IVPB.. 3.375 every 6 hours  vancomycin  IVPB 1250 every 12 hours    MEDICATIONS  (STANDING):  acetaminophen     Tablet .. 650 every 6 hours PRN  albuterol    90 MICROgram(s) HFA Inhaler 2 every 6 hours PRN  amLODIPine   Tablet 10 daily  carvedilol 3.125 every 12 hours  escitalopram 10 daily  famotidine    Tablet 20 daily  montelukast 10 daily  ondansetron Injectable 4 every 8 hours PRN  pantoprazole    Tablet 40 before breakfast    SOCIAL HISTORY: Denies any smoking or alcohol use. No recent travel or sick contacts     FAMILY HISTORY:  Family history of diabetes mellitus (Mother)    Family history of hypertension    Family history of hyperlipidemia    Family history of stomach cancer (Uncle)    REVIEW OF SYSTEMS:  CONSTITUTIONAL: No weakness. +subjective fevers and chills  EYES:  No visual changes, no eye pain   ENT: No vertigo or throat pain   NECK: No pain or stiffness  RESPIRATORY: No cough, wheezing, hemoptysis; No shortness of breath  CARDIOVASCULAR: No chest pain or palpitations  GASTROINTESTINAL: No abdominal or epigastric pain. + nausea and vomiting; + diarrhea. No melena or hematochezia.  GENITOURINARY: No dysuria, frequency or hematuria  NEUROLOGICAL: No numbness or weakness  SKIN: No itching, rashes  Psych: no anxiety or depression     Vital Signs Last 24 Hrs  T(F): 97.5 (09-07-24 @ 20:08), Max: 100.1 (09-06-24 @ 22:27)  Vital Signs Last 24 Hrs  HR: 75 (09-08-24 @ 05:33) (73 - 75)  BP: 115/76 (09-08-24 @ 05:33) (114/77 - 126/76)  RR: 18 (09-07-24 @ 20:08)  SpO2: 95% (09-07-24 @ 20:08) (94% - 95%)  Wt(kg): --    PHYSICAL EXAM:  Constitutional: non-toxic, no distress  HEAD/EYES: anicteric, no conjunctival injection  ENT:  supple, no thrush  Cardiovascular:   normal S1, S2, no murmur, no edema  Respiratory:  clear BS bilaterally, no wheezes, no rales  GI:  soft, non-tender, normal bowel sounds  :  no kennedy, no CVA tenderness  Musculoskeletal:  no synovitis, normal ROM  Neurologic: awake and alert, normal strength, no focal findings  Skin:  no rash, no erythema, no phlebitis  Heme/Onc: no lymphadenopathy   Psychiatric:  awake, alert, appropriate mood                            11.6   8.81  )-----------( 266      ( 08 Sep 2024 07:57 )             34.6   09-08    136  |  101  |  8   ----------------------------<  144<H>  3.2<L>   |  25  |  0.81    Ca    9.0      08 Sep 2024 07:57  Phos  1.8     09-08  Mg     2.4     09-08    TPro  7.0  /  Alb  3.6  /  TBili  0.5  /  DBili  x   /  AST  62<H>  /  ALT  73<H>  /  AlkPhos  106  09-07    Urinalysis Basic - ( 08 Sep 2024 07:57 )    Color: x / Appearance: x / SG: x / pH: x  Gluc: 144 mg/dL / Ketone: x  / Bili: x / Urobili: x   Blood: x / Protein: x / Nitrite: x   Leuk Esterase: x / RBC: x / WBC x   Sq Epi: x / Non Sq Epi: x / Bacteria: x    MICROBIOLOGY:  Culture - Blood (collected 06 Sep 2024 16:00)  Source: .Blood Blood-Peripheral  Preliminary Report (07 Sep 2024 20:01):    No growth at 24 hours    Culture - Urine (collected 06 Sep 2024 15:42)  Source: Clean Catch Clean Catch (Midstream)  Preliminary Report (07 Sep 2024 15:37):    10,000 - 49,000 CFU/mL Gram Positive Cocci in Pairs and Chains    <10,000 CFU/ml Normal Urogenital luis present    Culture - Blood (collected 06 Sep 2024 15:00)  Source: .Blood Blood-Peripheral  Gram Stain (08 Sep 2024 06:23):    Growth in aerobic bottle: Gram Positive Cocci in Clusters    Growth in anaerobic bottle: Gram Positive Cocci in Clusters  Preliminary Report (08 Sep 2024 10:03):    Growth in aerobic bottle: Staphylococcus warneri    Isolation of Coagulase negative Staphylococcus from single blood culture    sets may represent    contamination. Contact the Microbiology Department at [phone] if    susceptibility testing is    clinically indicated.    Direct identification is available within approximately 3-5    hours either by Blood Panel Multiplexed PCR or Direct    MALDI-TOF. Details: [email or web address]    Growth in anaerobic bottle: Gram Positive Cocci in Clusters  Organism: Blood Culture PCR (07 Sep 2024 20:15)  Organism: Blood Culture PCR (07 Sep 2024 20:15)      Method Type: PCR      -  Coagulase negative Staphylococcus: Detec                  RADIOLOGY:  imaging below personally reviewed and agree with findings    < from: CT Abdomen and Pelvis w/ IV Cont (09.06.24 @ 17:48) >    IMPRESSION: Pancolitis.    < end of copied text >

## 2024-09-08 NOTE — CONSULT NOTE ADULT - ATTENDING COMMENTS
58-year-old female with a past medical Struve hypertension, asthma, GERD who is admitted to the hospital due to acute diarrhea.    Patient reports diarrhea for about 5 days.  Reports diarrhea is nonbloody.  Also reports nausea with vomiting.  Reports no blood in vomitus, vomitus is nonbilious as well.  Patient reports recent weight loss, headaches, dizziness.  Reports subjective fever/chills.     Patient reports recent COVID infection about 2 weeks prior to admission.  Reports also recent antibiotic use during that time including amoxicillin and azithromycin.  Patient also reports attending multiple barbecues throughout the Labor Day weekend.  Denies eating any undercooked meats or other food items.    In the ED, patient is afebrile.  Latest labs show no leukocytosis, BMP with renal function within normal limits, hepatic function with AST 62, ALT 73.  Urinalysis without evidence for infection.  Blood culture with coagulase-negative staph in 2 out of 4 bottles, same set.  Urine culture also with the gram-positive cocci in pairs and chains.  CT abdomen/pelvis shows pancolitis.  Stool culture is pending.  C. difficile obtained on 9/8/2024 is negative, GI PCR obtained on 9/6/2024 is negative.    #Abnormal imaging of the abdomen  #Diarrhea  #Pancolitis  #Positive blood cultures    Recommendations  Would discontinue piperacillin/tazobactam  Start ceftriaxone and metronidazole, monitor for improvement–expect to discontinue once patient clinically improved  Thus far no infectious etiology established for patient's diarrhea, stool cultures pending follow  Blood culture with coagulase-negative staph in 1 set, likely contamination–patient does not have any hardware  Obtain repeat blood cultures  Follow fever curve and WBC count    Serafin Broussard MD  Division of Infectious Diseases

## 2024-09-08 NOTE — PROVIDER CONTACT NOTE (CRITICAL VALUE NOTIFICATION) - ACTION/TREATMENT ORDERED:
MD Atwood aware, no new orders. Zosyn and vancomycin in progress.
given K supplement
MD made aware ordered Vancomycin IV 1250mg

## 2024-09-08 NOTE — CONSULT NOTE ADULT - ASSESSMENT
58 y.o. F w/ PMH of HTN, asthma, and GERD who presents for acute non-bloody diarrhea a/w nausea and non-bloody, non-bilious vomiting x 5 days. Also with recent weight loss, headaches, dizziness, body pains, subjective fevers/chills. CTAP showed hepatic steatosis and pancolitis.     Assessment:  #Pancolitis  -Pt with 5 days of diarrhea with nausea and vomiting  -CT A/P showing hepatic steatosis and pancolitis  -Pt with Tmax of 100.1 on 9/6 with normal WBC   -UA neg, UCX with 10-49K of gram + cocci  -BCX (2/4) + for coag neg staph   -Pt currently on Vanc and zosyn    Recommendation:   58 y.o. F w/ PMH of HTN, asthma, and GERD who presents for acute non-bloody diarrhea a/w nausea and non-bloody, non-bilious vomiting x 5 days. Also with recent weight loss, headaches, dizziness, body pains, subjective fevers/chills. CTAP showed hepatic steatosis and pancolitis.     Assessment:  #Pancolitis  -Pt with 5 days of diarrhea with nausea and vomiting  -Pt had recent abx use with 1 week of amoxicillin (3 weeks ago) and azithromycin 5 days (1+ week ago). Was treated for COVID 2 weeks ago   -CT A/P showing hepatic steatosis and pancolitis  -Pt with Tmax of 100.1 on 9/6 with normal WBC   -UA neg, UCX with 10-49K of gram + cocci  -GI PCR neg  -BCX (2/4) + for coag neg staph   -Pt currently on Vanc and zosyn    Recommendation:   58 y.o. F w/ PMH of HTN, asthma, and GERD who presents for acute non-bloody diarrhea a/w nausea and non-bloody, non-bilious vomiting x 5 days. Also with recent weight loss, headaches, dizziness, body pains, subjective fevers/chills. CTAP showed hepatic steatosis and pancolitis.     Assessment:  #Pancolitis  -Pt with 5 days of diarrhea with nausea and vomiting  -Pt had recent abx use with 1 week of amoxicillin (3 weeks ago) and azithromycin 5 days (1+ week ago). Was treated for COVID 2 weeks ago   -CT A/P showing hepatic steatosis and pancolitis  -Pt with Tmax of 100.1 on 9/6 with normal WBC   -UA neg, UCX with 10-49K of gram + cocci  -GI PCR neg  -BCX (2/4) + for coag neg staph   -Pt currently on Vanc and zosyn    Recommendation:  -With recent hx of abx, wound recommend sending C.diff  -Blood culture likely contaminant, would send repeat BCX  -Recommend d/c zosyn and start Ceftriaxone and flagyl  -Cont to monitor fever and WBC curve     Discussed w/ Dr. Bobo Martini  ID Fellow

## 2024-09-08 NOTE — PROVIDER CONTACT NOTE (CRITICAL VALUE NOTIFICATION) - SITUATION
K=2.5
critical lab notification blood culture 09/06  growth in aerobic bottle gram positive cocci in clusters  growth in anaerobic bottle gram positive cocci in clusters
blood culture result from 09/06 growth in aerobic bottle gram positive cocci in clusters

## 2024-09-09 ENCOUNTER — RESULT REVIEW (OUTPATIENT)
Age: 59
End: 2024-09-09

## 2024-09-09 LAB
-  AMPICILLIN: SIGNIFICANT CHANGE UP
-  CIPROFLOXACIN: SIGNIFICANT CHANGE UP
-  LEVOFLOXACIN: SIGNIFICANT CHANGE UP
-  NITROFURANTOIN: SIGNIFICANT CHANGE UP
-  TETRACYCLINE: SIGNIFICANT CHANGE UP
-  VANCOMYCIN: SIGNIFICANT CHANGE UP
ADD ON TEST-SPECIMEN IN LAB: SIGNIFICANT CHANGE UP
ANION GAP SERPL CALC-SCNC: 14 MMOL/L — SIGNIFICANT CHANGE UP (ref 5–17)
BUN SERPL-MCNC: 5 MG/DL — LOW (ref 7–23)
CALCIUM SERPL-MCNC: 8.9 MG/DL — SIGNIFICANT CHANGE UP (ref 8.4–10.5)
CHLORIDE SERPL-SCNC: 103 MMOL/L — SIGNIFICANT CHANGE UP (ref 96–108)
CO2 SERPL-SCNC: 21 MMOL/L — LOW (ref 22–31)
CREAT SERPL-MCNC: 0.71 MG/DL — SIGNIFICANT CHANGE UP (ref 0.5–1.3)
CULTURE RESULTS: ABNORMAL
CULTURE RESULTS: ABNORMAL
EGFR: 98 ML/MIN/1.73M2 — SIGNIFICANT CHANGE UP
GLUCOSE SERPL-MCNC: 107 MG/DL — HIGH (ref 70–99)
HCT VFR BLD CALC: 37.7 % — SIGNIFICANT CHANGE UP (ref 34.5–45)
HGB BLD-MCNC: 12.4 G/DL — SIGNIFICANT CHANGE UP (ref 11.5–15.5)
MAGNESIUM SERPL-MCNC: 2.3 MG/DL — SIGNIFICANT CHANGE UP (ref 1.6–2.6)
MCHC RBC-ENTMCNC: 29.2 PG — SIGNIFICANT CHANGE UP (ref 27–34)
MCHC RBC-ENTMCNC: 32.9 GM/DL — SIGNIFICANT CHANGE UP (ref 32–36)
MCV RBC AUTO: 88.7 FL — SIGNIFICANT CHANGE UP (ref 80–100)
METHOD TYPE: SIGNIFICANT CHANGE UP
NRBC # BLD: 0 /100 WBCS — SIGNIFICANT CHANGE UP (ref 0–0)
ORGANISM # SPEC MICROSCOPIC CNT: ABNORMAL
PHOSPHATE SERPL-MCNC: 2.3 MG/DL — LOW (ref 2.5–4.5)
PLATELET # BLD AUTO: 307 K/UL — SIGNIFICANT CHANGE UP (ref 150–400)
POTASSIUM SERPL-MCNC: 3.4 MMOL/L — LOW (ref 3.5–5.3)
POTASSIUM SERPL-SCNC: 3.4 MMOL/L — LOW (ref 3.5–5.3)
RBC # BLD: 4.25 M/UL — SIGNIFICANT CHANGE UP (ref 3.8–5.2)
RBC # FLD: 14.1 % — SIGNIFICANT CHANGE UP (ref 10.3–14.5)
SODIUM SERPL-SCNC: 138 MMOL/L — SIGNIFICANT CHANGE UP (ref 135–145)
SPECIMEN SOURCE: SIGNIFICANT CHANGE UP
SPECIMEN SOURCE: SIGNIFICANT CHANGE UP
WBC # BLD: 8.34 K/UL — SIGNIFICANT CHANGE UP (ref 3.8–10.5)
WBC # FLD AUTO: 8.34 K/UL — SIGNIFICANT CHANGE UP (ref 3.8–10.5)

## 2024-09-09 PROCEDURE — 99232 SBSQ HOSP IP/OBS MODERATE 35: CPT

## 2024-09-09 PROCEDURE — 93306 TTE W/DOPPLER COMPLETE: CPT | Mod: 26

## 2024-09-09 PROCEDURE — 99233 SBSQ HOSP IP/OBS HIGH 50: CPT | Mod: GC

## 2024-09-09 PROCEDURE — 99233 SBSQ HOSP IP/OBS HIGH 50: CPT

## 2024-09-09 RX ORDER — ENOXAPARIN SODIUM 100 MG/ML
40 INJECTION SUBCUTANEOUS EVERY 24 HOURS
Refills: 0 | Status: DISCONTINUED | OUTPATIENT
Start: 2024-09-09 | End: 2024-09-11

## 2024-09-09 RX ORDER — L.ACID,PARA/B.BIFIDUM/S.THERM 8B CELL
1 CAPSULE ORAL
Refills: 0 | Status: DISCONTINUED | OUTPATIENT
Start: 2024-09-09 | End: 2024-09-11

## 2024-09-09 RX ORDER — POTASSIUM CHLORIDE 10 MEQ
20 TABLET, EXT RELEASE, PARTICLES/CRYSTALS ORAL ONCE
Refills: 0 | Status: COMPLETED | OUTPATIENT
Start: 2024-09-09 | End: 2024-09-09

## 2024-09-09 RX ORDER — DEXTROSE, SODIUM ACETATE, POTASSIUM CHLORIDE, POTASSIUM PHOSPHATE, AND SODIUM CHLORIDE 5; .15; .13; .28; .091 G/100ML; G/100ML; G/100ML; G/100ML; G/100ML
1000 INJECTION, SOLUTION INTRAVENOUS
Refills: 0 | Status: DISCONTINUED | OUTPATIENT
Start: 2024-09-09 | End: 2024-09-11

## 2024-09-09 RX ORDER — MAGNESIUM, ALUMINUM HYDROXIDE 200-225/5
30 SUSPENSION, ORAL (FINAL DOSE FORM) ORAL EVERY 6 HOURS
Refills: 0 | Status: DISCONTINUED | OUTPATIENT
Start: 2024-09-09 | End: 2024-09-11

## 2024-09-09 RX ORDER — SODIUM PHOSPHATE, DIBASIC, ANHYDROUS, POTASSIUM PHOSPHATE, MONOBASIC, AND SODIUM PHOSPHATE, MONOBASIC, MONOHYDRATE 852; 155; 130 MG/1; MG/1; MG/1
1 TABLET, COATED ORAL ONCE
Refills: 0 | Status: COMPLETED | OUTPATIENT
Start: 2024-09-09 | End: 2024-09-09

## 2024-09-09 RX ADMIN — Medication 100 MILLIGRAM(S): at 17:07

## 2024-09-09 RX ADMIN — DEXTROSE, SODIUM ACETATE, POTASSIUM CHLORIDE, POTASSIUM PHOSPHATE, AND SODIUM CHLORIDE 100 MILLILITER(S): 5; .15; .13; .28; .091 INJECTION, SOLUTION INTRAVENOUS at 02:31

## 2024-09-09 RX ADMIN — Medication 100 MILLIGRAM(S): at 05:26

## 2024-09-09 RX ADMIN — AMLODIPINE BESYLATE 10 MILLIGRAM(S): 10 TABLET ORAL at 05:26

## 2024-09-09 RX ADMIN — MONTELUKAST SODIUM 10 MILLIGRAM(S): 5 TABLET, CHEWABLE ORAL at 12:04

## 2024-09-09 RX ADMIN — Medication 20 MILLIEQUIVALENT(S): at 17:07

## 2024-09-09 RX ADMIN — FLUTICASONE PROPIONATE 1 SPRAY(S): 50 SPRAY, METERED NASAL at 17:08

## 2024-09-09 RX ADMIN — Medication 40 MILLIGRAM(S): at 05:26

## 2024-09-09 RX ADMIN — Medication 100 MILLIGRAM(S): at 13:04

## 2024-09-09 RX ADMIN — ESCITALOPRAM OXALATE 10 MILLIGRAM(S): 10 TABLET ORAL at 12:04

## 2024-09-09 RX ADMIN — DEXTROSE, SODIUM ACETATE, POTASSIUM CHLORIDE, POTASSIUM PHOSPHATE, AND SODIUM CHLORIDE 50 MILLILITER(S): 5; .15; .13; .28; .091 INJECTION, SOLUTION INTRAVENOUS at 14:26

## 2024-09-09 RX ADMIN — ENOXAPARIN SODIUM 40 MILLIGRAM(S): 100 INJECTION SUBCUTANEOUS at 12:03

## 2024-09-09 RX ADMIN — FAMOTIDINE 20 MILLIGRAM(S): 10 INJECTION INTRAVENOUS at 12:04

## 2024-09-09 RX ADMIN — Medication 1 TABLET(S): at 17:08

## 2024-09-09 RX ADMIN — SODIUM PHOSPHATE, DIBASIC, ANHYDROUS, POTASSIUM PHOSPHATE, MONOBASIC, AND SODIUM PHOSPHATE, MONOBASIC, MONOHYDRATE 1 PACKET(S): 852; 155; 130 TABLET, COATED ORAL at 09:17

## 2024-09-09 RX ADMIN — FLUTICASONE PROPIONATE 1 SPRAY(S): 50 SPRAY, METERED NASAL at 05:22

## 2024-09-09 RX ADMIN — CARVEDILOL 3.12 MILLIGRAM(S): 6.25 TABLET ORAL at 17:09

## 2024-09-09 RX ADMIN — ACETAMINOPHEN 650 MILLIGRAM(S): 325 TABLET ORAL at 21:24

## 2024-09-09 RX ADMIN — ACETAMINOPHEN 650 MILLIGRAM(S): 325 TABLET ORAL at 22:20

## 2024-09-09 RX ADMIN — Medication 100 MILLIGRAM(S): at 23:15

## 2024-09-09 RX ADMIN — CARVEDILOL 3.12 MILLIGRAM(S): 6.25 TABLET ORAL at 05:26

## 2024-09-09 RX ADMIN — Medication 30 MILLILITER(S): at 23:15

## 2024-09-09 NOTE — CONSULT NOTE ADULT - ASSESSMENT
Pt is 58 y.o. F w/ PMH of HTN, asthma, heaptic steatosis, sp ccy and GERD who presents for acute gastroenteritis - acute onset 9/3  recent COVID infection 2 weeks ago  +recent Abx use (amoxicillin + Azithromycin)  BCx + coag negative staph 2/4 bottles (same set)  CT AP w/ mild pancolitis, GI PCR and C diff negative  Stool Cx NGTD    #nausea/vomiting - improved  #diarrhea w/ mild pancolitis on CT- likely infectious etio ?viral  f/u stool Cx  GI PCR, C diff negative    -Abx per ID  -add probiotic BID  -trial low lactose LRD, monitor clinically  -monitor stooling and lytes/ replete PRN  -follow up final Stool Cx  -Fecal calprotectin testing; low suspicion for inflammatory etio given acute onset of symptoms  -outpt Colonoscopy in 6-8 weeks following resolution  -continue Pepcid and PPI as ordered  -follow up repeat Bcx      Discussed with pt; all questions answered  Dr Cisse updated  Discussed with Medicine attending    [facility] Teaching [facility] Service  Available on TEAMS Mon-Fri 8a-4p  After hours and weekend coverage [phone]

## 2024-09-09 NOTE — PROGRESS NOTE ADULT - TIME BILLING
done
chart review, plan of care
Time-based billing (NON-critical care).     The necessity of the time spent during the encounter on this date of service was due to:     - Ordering, reviewing, and interpreting labs, testing, and imaging.  - Independently obtaining a review of systems and performing a physical exam  - Reviewing prior hospitalization and where necessary, outpatient records.  - Counselling and educating patient and family regarding interpretation of aforementioned items and plan of care.

## 2024-09-09 NOTE — PROGRESS NOTE ADULT - PROBLEM SELECTOR PLAN 1
- GI PCR (-)  - ordered stool culture, calprotectin  - switched Unasyn to Zosyn IV   - f/u salmonella  - f/u shigella  - will hold off on anti-diarrheals until infectious causes are ruled out  - K 2.5 i/s/o diarrhea and poor po intake  - repleted k and started on 1/2 NS + 20 meq KCl 100cc/hr 24 hr - GI PCR (-)  - Stool culture- NGTD  - f/u calprotectin  - f/u salmonella  - f/u shigella  - switched Zosyn to ceftriaxone and flagyl per ID recs  - will hold off on anti-diarrheals until infectious causes are ruled out  - K 3.2 i/s/o diarrhea and poor po intake  - repleted k and started on 1/2 NS + 20 meq KCl 100cc/hr 24 hr  - gastroenterology consulted

## 2024-09-09 NOTE — CONSULT NOTE ADULT - SUBJECTIVE AND OBJECTIVE BOX
**** INCOMPLETE NOTE ****      HPI:  Pt is 58 y.o. F w/ PMH of HTN, asthma, and GERD who presents for acute non-bloody diarrhea a/w nausea and non-bloody, non-bilious vomiting. 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 She had a planned GI appt next week for chronic constant RUQ pain that has been occurring for the past few months and feelings of distension in her epigastric region, but after having these episodes of diarrhea, pt had a video appt with her GI doctor who recommended calling EMS. She describes the diarrhea as watery, non-bloody, odorous, dark brown, and nonstop. She states that it had been occurring every 30-40 min and sometimes happens so quickly that she is not able to make it to the bathroom in time. She has also had body pains, headaches, dizziness, subjective fevers/chills - she's not sure how high her fevers have been as she doesn't have a thermometer and has tried taking Tylenol but they were only relieved with Motrin. She has not been able to eat and has only been drinking fluids since the episodes first started. She endorsed a 12-lb weight loss in the past few days and depression because she left her dogs alone at home but denied any chest pain, SOB, urinary symptoms, numbness/tingling. Her last colonoscopy was 4 years ago- only diverticulosis was found.    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 Prior to the onset of diarrhea, during the last week, pt did home visits on 8/26-8/28, worked remotely on 8/29, attended a fundraising event on 8/30 where she ate Chinese food including chicken and beef, and a BBQ on 8/31 where she ate [place] food, potatoes, Russian salad, chicken, and energy shakes. She doesn't recall what she had 2 days prior to the onset of diarrhea and denied any sick contacts. She recently had covid three weeks ago and states that after she wasn't getting better, she was started on prednisone and azithromycin at Urgent Care.     In the ED, pt was HDS, afebrile. U/A (-). CTAP showed hepatic steatosis and pancolitis. Na 132, Cr 1.55. S/p 1-time dose of Unasyn. GI PCR, BCx, UCx were done. WBC count, lipase wnl.    At the time of exam, pt stated that she continues having diarrhea but every 2 hours now. Her vomiting has resolved after being given Zofran but she continues having stomach cramps, mostly in the LUQ and LLQ. (06 Sep 2024 22:22)    recent COVID infection 2 weeks ago  +recent Abx use (amoxicillin + Azithromycin)  BCx + coag negative staph 2/4 bottles (same set)  CT AP w/ mild pancolitis, GI PCR and C diff negative  Stool Cx NGTD    PAST MEDICAL & SURGICAL HISTORY:  HTN (hypertension)  Menorrhagia  GERD (gastroesophageal reflux disease)  Hiatal hernia  Asthma - uses albuterol PRN , stable  Seasonal allergies  Eczema  History of tonsillectomy and adenoidectomy at age 5  History of ankle surgery  History of myomectomy  S/P cholecystectomy  Hepatic steatosis        Allergies  cinnamon (Unknown)  Cucumbers,  Squash (all varieties) (Pruritus; Urticaria)  Tree Nuts (Short breath)  eggs (Rash)  shea butter (Unknown)  aspirin (Other)  [place] (Unknown)  Cashews (Unknown)  latex (Pruritus; Rash)    Intolerances  lactose (Diarrhea)      MEDICATIONS  (STANDING):  amLODIPine   Tablet 10 milliGRAM(s) Oral daily  carvedilol 3.125 milliGRAM(s) Oral every 12 hours  cefTRIAXone   IVPB 1000 milliGRAM(s) IV Intermittent every 24 hours  enoxaparin Injectable 40 milliGRAM(s) SubCutaneous every 24 hours  escitalopram 10 milliGRAM(s) Oral daily  famotidine    Tablet 20 milliGRAM(s) Oral daily  fluticasone propionate 50 MICROgram(s)/spray Nasal Spray 1 Spray(s) Both Nostrils two times a day  metroNIDAZOLE  IVPB 500 milliGRAM(s) IV Intermittent every 8 hours  metroNIDAZOLE  IVPB      montelukast 10 milliGRAM(s) Oral daily  pantoprazole    Tablet 40 milliGRAM(s) Oral before breakfast  sodium chloride 0.45% with potassium chloride 20 mEq/L 1000 milliLiter(s) (100 mL/Hr) IV Continuous <Continuous>    MEDICATIONS  (PRN):  acetaminophen     Tablet .. 650 milliGRAM(s) Oral every 6 hours PRN Temp greater or equal to 38C (100.4F), Mild Pain (1 - 3)  albuterol    90 MICROgram(s) HFA Inhaler 2 Puff(s) Inhalation every 6 hours PRN Shortness of Breath and/or Wheezing  ondansetron Injectable 4 milliGRAM(s) IV Push every 8 hours PRN Nausea and/or Vomiting      Social History:        Family History   IBD (  ) Yes   (  ) No  GI Malignancy (  )  Yes    (  ) No    Health Management     Last Colonoscopy -      Advanced Directives: (     ) None    (      ) DNR    (     ) DNI    (     ) Health Care Proxy:     Review of Systems:  see HPI- remainder 10 point ROS negative    Vital Signs Last 24 Hrs  T(C): 37.1 (09 Sep 2024 04:00), Max: 37.1 (09 Sep 2024 04:00)  T(F): 98.7 (09 Sep 2024 04:00), Max: 98.7 (09 Sep 2024 04:00)  HR: 61 (09 Sep 2024 04:00) (61 - 80)  BP: 123/80 (09 Sep 2024 04:00) (123/80 - 130/84)  BP(mean): --  RR: 18 (09 Sep 2024 04:00) (18 - 18)  SpO2: 95% (09 Sep 2024 04:00) (95% - 97%)    Parameters below as of 09 Sep 2024 04:00  Patient On (Oxygen Delivery Method): room air        PHYSICAL EXAM:    Constitutional:   Neck:   Respiratory:   Cardiovascular:   Gastrointestinal:   Extremities:   Vascular:   Neurological:   Psychiatric:   Skin:         LABS:                        12.4   8.34  )-----------( 307      ( 09 Sep 2024 07:04 )             37.7   WBC Count: 8.81 K/uL (09.08.24 @ 07:57)   WBC Count: 7.69 K/uL (09.07.24 @ 08:25)   WBC Count: 6.74 K/uL (09.06.24 @ 15:43)     09-08    136  |  101  |  8   ----------------------------<  144<H>  3.2<L>   |  25  |  0.81    Ca    9.0      08 Sep 2024 07:57  Phos  2.3     09-09  Mg     2.3     09-09      LIVER FUNCTIONS - ( 07 Sep 2024 08:25 )  Alb: 3.6 g/dL / Pro: 7.0 g/dL / ALK PHOS: 106 U/L / ALT: 73 U/L / AST: 62 U/L / GGT: x           C. difficile GDH & toxins A/B by EIA (09.08.24 @ 13:15)   Clostridium difficile GDH Toxins A&B, EIA:   Negative  Clostridium difficile GDH Interpretation: Negative for toxigenic C. Difficile.    Culture - Stool (09.07.24 @ 07:04)   Specimen Source: .Stool alejandra aniket  Culture Results:   No enteric pathogens to date: Final culture pending    GI PCR Panel Stool (09.06.24 @ 16:59)   GI PCR Panel: NotDetec:     Culture - Blood (09.06.24 @ 16:00)   Specimen Source: .Blood Blood-Peripheral  Culture Results:   No growth at 48 Hours    Culture - Blood (09.06.24 @ 15:00)   - Coagulase negative Staphylococcus: Detec  Growth in aerobic bottle: Staphylococcus warneri   Growth in anaerobic bottle: Staphylococcus capitis   Isolation of Coagulase negative Staphylococcus from single blood culture   sets may represent   contamination    RADIOLOGY & ADDITIONAL TESTS:      ACC: [id number] EXAM:  CT ABDOMEN AND PELVIS IC   ORDERED BY: DHRUV XAVIER     PROCEDURE DATE:  09/06/2024          INTERPRETATION:  CLINICAL INFORMATION: Abdominal pain MCT    COMPARISON: 9/28/2019..    CONTRAST/COMPLICATIONS:  IV Contrast: Omnipaque 350  90 cc administered   10 cc discarded  Oral Contrast: NONE  Complications: None reported at time of study completion    PROCEDURE:  CT of the Abdomen and Pelvis was performed.  Sagittal and coronal reformats were performed.    FINDINGS:    LOWER CHEST: Within normal limits.    LIVER: Hepatic steatosis.  BILE DUCTS: Normal caliber.  GALLBLADDER: Status post cholecystectomy.  SPLEEN: Within normal limits.  PANCREAS: Within normal limits.  ADRENALS: Within normal limits.  KIDNEYS/URETERS: Within normal limits.    BLADDER: Within normal limits.  REPRODUCTIVE ORGANS: Fibroid uterus.    BOWEL: No bowel obstruction. The appendix is normal.  Mild pancolonic   thickening.  PERITONEUM/RETROPERITONEUM: Within normal limits.  VESSELS:  Within normal limits.  ABDOMINAL WALL: Within normal limits.  BONES: Within normal limits.    IMPRESSION: Pancolitis.       HPI:  Pt is 58 y.o. F w/ PMH of HTN, asthma, and GERD who presents for acute non-bloody diarrhea a/w nausea and non-bloody, non-bilious vomiting. 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 She had a planned GI appt next week for chronic constant RUQ pain that has been occurring for the past few months and feelings of distension in her epigastric region, but after having these episodes of diarrhea, pt had a video appt with her GI doctor who recommended calling EMS. She describes the diarrhea as watery, non-bloody, odorous, dark brown, and nonstop. She states that it had been occurring every 30-40 min and sometimes happens so quickly that she is not able to make it to the bathroom in time. She has also had body pains, headaches, dizziness, subjective fevers/chills - she's not sure how high her fevers have been as she doesn't have a thermometer and has tried taking Tylenol but they were only relieved with Motrin. She has not been able to eat and has only been drinking fluids since the episodes first started. She endorsed a 12-lb weight loss in the past few days and depression because she left her dogs alone at home but denied any chest pain, SOB, urinary symptoms, numbness/tingling. Her last colonoscopy was 4 years ago- only diverticulosis was found.    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 Prior to the onset of diarrhea, during the last week, pt did home visits on 8/26-8/28, worked remotely on 8/29, attended a fundraising event on 8/30 where she ate Chinese food including chicken and beef, and a BBQ on 8/31 where she ate [place] food, potatoes, Russian salad, chicken, and energy shakes. She doesn't recall what she had 2 days prior to the onset of diarrhea and denied any sick contacts. She recently had covid three weeks ago and states that after she wasn't getting better, she was started on prednisone and azithromycin at Urgent Care.     In the ED, pt was HDS, afebrile. U/A (-). CTAP showed hepatic steatosis and pancolitis. Na 132, Cr 1.55. S/p 1-time dose of Unasyn. GI PCR, BCx, UCx were done. WBC count, lipase wnl.    At the time of exam, pt stated that she continues having diarrhea but every 2 hours now. Her vomiting has resolved after being given Zofran but she continues having stomach cramps, mostly in the LUQ and LLQ. (06 Sep 2024 22:22)    recent COVID infection 2 weeks ago  +recent Abx use (amoxicillin + Azithromycin)  BCx + coag negative staph 2/4 bottles (same set)  CT AP w/ mild pancolitis, GI PCR and C diff negative  Stool Cx NGTD    no GI symptoms/complaints prior to last Tuesday - baseline stooling ~3 soft BM/day  no rectal bleeding  Tm 100.1F, no leukocytosis    Overall some improvement - decreased stool volume and frequency since admission- still loose, liquid green  some bloating and colicky/gas discomfort  no nausea or vomiting    PAST MEDICAL & SURGICAL HISTORY:  HTN (hypertension)  Menorrhagia  GERD (gastroesophageal reflux disease)  Hiatal hernia  Asthma - uses albuterol PRN , stable  Seasonal allergies  Eczema  History of tonsillectomy and adenoidectomy at age 5  History of ankle surgery  History of myomectomy  S/P cholecystectomy  Hepatic steatosis      Allergies  cinnamon (Unknown)  Cucumbers,  Squash (all varieties) (Pruritus; Urticaria)  Tree Nuts (Short breath)  eggs (Rash)  shea butter (Unknown)  aspirin (Other)  [place] (Unknown)  Cashews (Unknown)  latex (Pruritus; Rash)    Intolerances  lactose (Diarrhea)      MEDICATIONS  (STANDING):  amLODIPine   Tablet 10 milliGRAM(s) Oral daily  carvedilol 3.125 milliGRAM(s) Oral every 12 hours  cefTRIAXone   IVPB 1000 milliGRAM(s) IV Intermittent every 24 hours  enoxaparin Injectable 40 milliGRAM(s) SubCutaneous every 24 hours  escitalopram 10 milliGRAM(s) Oral daily  famotidine    Tablet 20 milliGRAM(s) Oral daily  fluticasone propionate 50 MICROgram(s)/spray Nasal Spray 1 Spray(s) Both Nostrils two times a day  metroNIDAZOLE  IVPB 500 milliGRAM(s) IV Intermittent every 8 hours  metroNIDAZOLE  IVPB      montelukast 10 milliGRAM(s) Oral daily  pantoprazole    Tablet 40 milliGRAM(s) Oral before breakfast  sodium chloride 0.45% with potassium chloride 20 mEq/L 1000 milliLiter(s) (100 mL/Hr) IV Continuous <Continuous>    MEDICATIONS  (PRN):  acetaminophen     Tablet .. 650 milliGRAM(s) Oral every 6 hours PRN Temp greater or equal to 38C (100.4F), Mild Pain (1 - 3)  albuterol    90 MICROgram(s) HFA Inhaler 2 Puff(s) Inhalation every 6 hours PRN Shortness of Breath and/or Wheezing  ondansetron Injectable 4 milliGRAM(s) IV Push every 8 hours PRN Nausea and/or Vomiting      Social History:  lives at home  work primarily at home; occasional site visits  no toxic habits    Family History   IBD (  ) Yes   ( X ) No  GI Malignancy (  )  Yes    ( X ) No    Health Management     Last Colonoscopy - 4 years ago +diverticulosis      Advanced Directives: (   X  ) None    (      ) DNR    (     ) DNI    (     ) Health Care Proxy:     Review of Systems:  see HPI- remainder 10 point ROS negative    Vital Signs Last 24 Hrs  T(C): 37.1 (09 Sep 2024 04:00), Max: 37.1 (09 Sep 2024 04:00)  T(F): 98.7 (09 Sep 2024 04:00), Max: 98.7 (09 Sep 2024 04:00)  HR: 61 (09 Sep 2024 04:00) (61 - 80)  BP: 123/80 (09 Sep 2024 04:00) (123/80 - 130/84)  BP(mean): --  RR: 18 (09 Sep 2024 04:00) (18 - 18)  SpO2: 95% (09 Sep 2024 04:00) (95% - 97%)    Parameters below as of 09 Sep 2024 04:00  Patient On (Oxygen Delivery Method): room air        PHYSICAL EXAM:    Constitutional: non toxic appearing  female   Neck: no LAD  Respiratory: clear bl  Cardiovascular: S1S2 regular  Gastrointestinal: obese soft sl distended NT no rebound guarding or rigidity.   observed stool in commed - small volume loose green stool  Extremities: no edema  Vascular: bl pulses, warm  Neurological: AOx3 no focal asymmetry  Psychiatric: sl anxious. calm and cooperative  Skin: anicteric        LABS:                        12.4   8.34  )-----------( 307      ( 09 Sep 2024 07:04 )             37.7   WBC Count: 8.81 K/uL (09.08.24 @ 07:57)   WBC Count: 7.69 K/uL (09.07.24 @ 08:25)   WBC Count: 6.74 K/uL (09.06.24 @ 15:43)     09-08    136  |  101  |  8   ----------------------------<  144<H>  3.2<L>   |  25  |  0.81    Ca    9.0      08 Sep 2024 07:57  Phos  2.3     09-09  Mg     2.3     09-09      LIVER FUNCTIONS - ( 07 Sep 2024 08:25 )  Alb: 3.6 g/dL / Pro: 7.0 g/dL / ALK PHOS: 106 U/L / ALT: 73 U/L / AST: 62 U/L / GGT: x           C. difficile GDH & toxins A/B by EIA (09.08.24 @ 13:15)   Clostridium difficile GDH Toxins A&B, EIA:   Negative  Clostridium difficile GDH Interpretation: Negative for toxigenic C. Difficile.    Culture - Stool (09.07.24 @ 07:04)   Specimen Source: .Stool alejandra aniket  Culture Results:   No enteric pathogens to date: Final culture pending    GI PCR Panel Stool (09.06.24 @ 16:59)   GI PCR Panel: NotDetec:     Culture - Blood (09.06.24 @ 16:00)   Specimen Source: .Blood Blood-Peripheral  Culture Results:   No growth at 48 Hours    Culture - Blood (09.06.24 @ 15:00)   - Coagulase negative Staphylococcus: Detec  Growth in aerobic bottle: Staphylococcus warneri   Growth in anaerobic bottle: Staphylococcus capitis   Isolation of Coagulase negative Staphylococcus from single blood culture   sets may represent   contamination    RADIOLOGY & ADDITIONAL TESTS:      ACC: [id number] EXAM:  CT ABDOMEN AND PELVIS IC   ORDERED BY: DHRUV XAVIER     PROCEDURE DATE:  09/06/2024          INTERPRETATION:  CLINICAL INFORMATION: Abdominal pain MCT    COMPARISON: 9/28/2019..    CONTRAST/COMPLICATIONS:  IV Contrast: Omnipaque 350  90 cc administered   10 cc discarded  Oral Contrast: NONE  Complications: None reported at time of study completion    PROCEDURE:  CT of the Abdomen and Pelvis was performed.  Sagittal and coronal reformats were performed.    FINDINGS:    LOWER CHEST: Within normal limits.    LIVER: Hepatic steatosis.  BILE DUCTS: Normal caliber.  GALLBLADDER: Status post cholecystectomy.  SPLEEN: Within normal limits.  PANCREAS: Within normal limits.  ADRENALS: Within normal limits.  KIDNEYS/URETERS: Within normal limits.    BLADDER: Within normal limits.  REPRODUCTIVE ORGANS: Fibroid uterus.    BOWEL: No bowel obstruction. The appendix is normal.  Mild pancolonic   thickening.  PERITONEUM/RETROPERITONEUM: Within normal limits.  VESSELS:  Within normal limits.  ABDOMINAL WALL: Within normal limits.  BONES: Within normal limits.    IMPRESSION: Pancolitis.

## 2024-09-09 NOTE — CONSULT NOTE ADULT - NS ATTEND AMEND GEN_ALL_CORE FT
ACute n/v and diarrhea , with fever probable infectious gastroenteritis, s/p ct scan mild diffuse colitis    plan antibiotics per ID  po as tolerated  fu GI as outpatient for colonoscopy in 8 to 12 weeks  f/u stool cultures

## 2024-09-10 ENCOUNTER — TRANSCRIPTION ENCOUNTER (OUTPATIENT)
Age: 59
End: 2024-09-10

## 2024-09-10 LAB
ANION GAP SERPL CALC-SCNC: 11 MMOL/L — SIGNIFICANT CHANGE UP (ref 5–17)
BUN SERPL-MCNC: 4 MG/DL — LOW (ref 7–23)
CALCIUM SERPL-MCNC: 9 MG/DL — SIGNIFICANT CHANGE UP (ref 8.4–10.5)
CALPROTECTIN STL-MCNT: 3340 UG/G — HIGH (ref 0–120)
CHLORIDE SERPL-SCNC: 107 MMOL/L — SIGNIFICANT CHANGE UP (ref 96–108)
CO2 SERPL-SCNC: 22 MMOL/L — SIGNIFICANT CHANGE UP (ref 22–31)
CREAT SERPL-MCNC: 0.67 MG/DL — SIGNIFICANT CHANGE UP (ref 0.5–1.3)
CULTURE RESULTS: SIGNIFICANT CHANGE UP
EGFR: 101 ML/MIN/1.73M2 — SIGNIFICANT CHANGE UP
GLUCOSE SERPL-MCNC: 118 MG/DL — HIGH (ref 70–99)
HCT VFR BLD CALC: 35.9 % — SIGNIFICANT CHANGE UP (ref 34.5–45)
HGB BLD-MCNC: 11.8 G/DL — SIGNIFICANT CHANGE UP (ref 11.5–15.5)
MAGNESIUM SERPL-MCNC: 2.1 MG/DL — SIGNIFICANT CHANGE UP (ref 1.6–2.6)
MCHC RBC-ENTMCNC: 29.6 PG — SIGNIFICANT CHANGE UP (ref 27–34)
MCHC RBC-ENTMCNC: 32.9 GM/DL — SIGNIFICANT CHANGE UP (ref 32–36)
MCV RBC AUTO: 90 FL — SIGNIFICANT CHANGE UP (ref 80–100)
NRBC # BLD: 0 /100 WBCS — SIGNIFICANT CHANGE UP (ref 0–0)
PHOSPHATE SERPL-MCNC: 2.1 MG/DL — LOW (ref 2.5–4.5)
PLATELET # BLD AUTO: 332 K/UL — SIGNIFICANT CHANGE UP (ref 150–400)
POTASSIUM SERPL-MCNC: 3.7 MMOL/L — SIGNIFICANT CHANGE UP (ref 3.5–5.3)
POTASSIUM SERPL-SCNC: 3.7 MMOL/L — SIGNIFICANT CHANGE UP (ref 3.5–5.3)
RBC # BLD: 3.99 M/UL — SIGNIFICANT CHANGE UP (ref 3.8–5.2)
RBC # FLD: 13.8 % — SIGNIFICANT CHANGE UP (ref 10.3–14.5)
SODIUM SERPL-SCNC: 140 MMOL/L — SIGNIFICANT CHANGE UP (ref 135–145)
SPECIMEN SOURCE: SIGNIFICANT CHANGE UP
WBC # BLD: 7.65 K/UL — SIGNIFICANT CHANGE UP (ref 3.8–10.5)
WBC # FLD AUTO: 7.65 K/UL — SIGNIFICANT CHANGE UP (ref 3.8–10.5)

## 2024-09-10 PROCEDURE — 99232 SBSQ HOSP IP/OBS MODERATE 35: CPT

## 2024-09-10 PROCEDURE — 99232 SBSQ HOSP IP/OBS MODERATE 35: CPT | Mod: GC

## 2024-09-10 RX ORDER — SODIUM PHOSPHATE, MONOBASIC, MONOHYDRATE AND SODIUM PHOSPHATE, DIBASIC ANHYDROUS 276; 142 MG/ML; MG/ML
15 INJECTION, SOLUTION INTRAVENOUS ONCE
Refills: 0 | Status: COMPLETED | OUTPATIENT
Start: 2024-09-10 | End: 2024-09-10

## 2024-09-10 RX ORDER — MAGNESIUM, ALUMINUM HYDROXIDE 200-225/5
30 SUSPENSION, ORAL (FINAL DOSE FORM) ORAL EVERY 4 HOURS
Refills: 0 | Status: DISCONTINUED | OUTPATIENT
Start: 2024-09-10 | End: 2024-09-11

## 2024-09-10 RX ORDER — SODIUM PHOSPHATE, DIBASIC, ANHYDROUS, POTASSIUM PHOSPHATE, MONOBASIC, AND SODIUM PHOSPHATE, MONOBASIC, MONOHYDRATE 852; 155; 130 MG/1; MG/1; MG/1
1 TABLET, COATED ORAL ONCE
Refills: 0 | Status: COMPLETED | OUTPATIENT
Start: 2024-09-10 | End: 2024-09-10

## 2024-09-10 RX ORDER — DICYCLOMINE HCL 20 MG
10 TABLET ORAL
Refills: 0 | Status: DISCONTINUED | OUTPATIENT
Start: 2024-09-10 | End: 2024-09-11

## 2024-09-10 RX ADMIN — Medication 40 MILLIGRAM(S): at 06:08

## 2024-09-10 RX ADMIN — ACETAMINOPHEN 650 MILLIGRAM(S): 325 TABLET ORAL at 02:29

## 2024-09-10 RX ADMIN — Medication 1 TABLET(S): at 18:30

## 2024-09-10 RX ADMIN — FAMOTIDINE 20 MILLIGRAM(S): 10 INJECTION INTRAVENOUS at 11:04

## 2024-09-10 RX ADMIN — DEXTROSE, SODIUM ACETATE, POTASSIUM CHLORIDE, POTASSIUM PHOSPHATE, AND SODIUM CHLORIDE 50 MILLILITER(S): 5; .15; .13; .28; .091 INJECTION, SOLUTION INTRAVENOUS at 01:27

## 2024-09-10 RX ADMIN — ONDANSETRON 4 MILLIGRAM(S): 2 INJECTION, SOLUTION INTRAMUSCULAR; INTRAVENOUS at 11:03

## 2024-09-10 RX ADMIN — Medication 100 MILLIGRAM(S): at 23:24

## 2024-09-10 RX ADMIN — Medication 30 MILLILITER(S): at 06:06

## 2024-09-10 RX ADMIN — Medication 10 MILLIGRAM(S): at 18:30

## 2024-09-10 RX ADMIN — Medication 100 MILLIGRAM(S): at 18:30

## 2024-09-10 RX ADMIN — ACETAMINOPHEN 650 MILLIGRAM(S): 325 TABLET ORAL at 23:31

## 2024-09-10 RX ADMIN — ESCITALOPRAM OXALATE 10 MILLIGRAM(S): 10 TABLET ORAL at 23:25

## 2024-09-10 RX ADMIN — AMLODIPINE BESYLATE 10 MILLIGRAM(S): 10 TABLET ORAL at 06:09

## 2024-09-10 RX ADMIN — ACETAMINOPHEN 650 MILLIGRAM(S): 325 TABLET ORAL at 03:25

## 2024-09-10 RX ADMIN — ENOXAPARIN SODIUM 40 MILLIGRAM(S): 100 INJECTION SUBCUTANEOUS at 11:25

## 2024-09-10 RX ADMIN — MONTELUKAST SODIUM 10 MILLIGRAM(S): 5 TABLET, CHEWABLE ORAL at 11:04

## 2024-09-10 RX ADMIN — Medication 30 MILLILITER(S): at 11:24

## 2024-09-10 RX ADMIN — Medication 1 TABLET(S): at 07:59

## 2024-09-10 RX ADMIN — FLUTICASONE PROPIONATE 1 SPRAY(S): 50 SPRAY, METERED NASAL at 06:07

## 2024-09-10 RX ADMIN — SODIUM PHOSPHATE, DIBASIC, ANHYDROUS, POTASSIUM PHOSPHATE, MONOBASIC, AND SODIUM PHOSPHATE, MONOBASIC, MONOHYDRATE 1 PACKET(S): 852; 155; 130 TABLET, COATED ORAL at 11:04

## 2024-09-10 RX ADMIN — Medication 100 MILLIGRAM(S): at 14:48

## 2024-09-10 RX ADMIN — FLUTICASONE PROPIONATE 1 SPRAY(S): 50 SPRAY, METERED NASAL at 18:31

## 2024-09-10 RX ADMIN — CARVEDILOL 3.12 MILLIGRAM(S): 6.25 TABLET ORAL at 18:30

## 2024-09-10 RX ADMIN — ONDANSETRON 4 MILLIGRAM(S): 2 INJECTION, SOLUTION INTRAMUSCULAR; INTRAVENOUS at 20:55

## 2024-09-10 RX ADMIN — CARVEDILOL 3.12 MILLIGRAM(S): 6.25 TABLET ORAL at 06:09

## 2024-09-10 RX ADMIN — SODIUM PHOSPHATE, MONOBASIC, MONOHYDRATE AND SODIUM PHOSPHATE, DIBASIC ANHYDROUS 63.75 MILLIMOLE(S): 276; 142 INJECTION, SOLUTION INTRAVENOUS at 14:48

## 2024-09-10 RX ADMIN — Medication 30 MILLILITER(S): at 18:34

## 2024-09-10 RX ADMIN — Medication 100 MILLIGRAM(S): at 06:02

## 2024-09-10 NOTE — DISCHARGE NOTE PROVIDER - HOSPITAL COURSE
58 y.o. F w/ PMH of HTN, asthma, and GERD who presents for acute non-bloody diarrhea a/w nausea and non-bloody, non-bilious vomiting. 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 She had a planned GI appt next week for chronic constant RUQ pain that has been occurring for the past few months and feelings of distension in her epigastric region, but after having these episodes of diarrhea, pt had a video appt with her GI doctor who recommended calling EMS. She describes the diarrhea as watery, non-bloody, odorous, dark brown, and nonstop. She states that it had been occurring every 30-40 min and sometimes happens so quickly that she is not able to make it to the bathroom in time. In the ED, pt was HDS, afebrile. U/A (-). CTAP showed hepatic steatosis and pancolitis. S/p 1-time dose of Unasyn. GI PCR, BCx, UCx were done. WBC count, lipase wnl. Admitted to medicine, and started on zosyn that was transitioned to CTX and flagyl with ID on board to complete total 5 day course. GI PCR was negative, C diff was negative. GI consulted and suspect an infectious source rather than inflammatory process with recommendation to f/u for colonoscopy 6-8 weeks after symptom resolution. Pt was repleted with IVF and electrolytes during hospital stay and bowel movements lessened. Stable for discharge with supportive care.      The patient is afebrile, hemodynamically stable and medically optimized for discharge to home with follow up with GI. On day of discharge, patient is clinically stable with no new exam findings or acute symptoms compared to prior. The patient was seen by the attending physician on the date of discharge and deemed stable and acceptable for discharge. The patient's chronic medical conditions were treated accordingly per the patient's home medication regimen. The patient's medication reconciliation (with changes made to chronic medications), follow up appointments, discharge orders, instructions, and significant lab and diagnostic studies are as noted.    Important Medication Changes and Reason:      Active or Pending Issues Requiring Follow-up:      Advanced Directives:   [ ] Full code  [ ] DNR  [ ] Hospice       58 y.o. F w/ PMH of HTN, asthma, and GERD who presents for acute non-bloody diarrhea a/w nausea and non-bloody, non-bilious vomiting. 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 She had a planned GI appt next week for chronic constant RUQ pain that has been occurring for the past few months and feelings of distension in her epigastric region, but after having these episodes of diarrhea, pt had a video appt with her GI doctor who recommended calling EMS. She describes the diarrhea as watery, non-bloody, odorous, dark brown, and nonstop. She states that it had been occurring every 30-40 min and sometimes happens so quickly that she is not able to make it to the bathroom in time. In the ED, pt was HDS, afebrile. U/A (-). CTAP showed hepatic steatosis and pancolitis. S/p 1-time dose of Unasyn. GI PCR, BCx, UCx were done. WBC count, lipase wnl. Admitted to medicine, and started on zosyn that was transitioned to CTX and flagyl with ID on board, which was completed inpt prior to DC. GI PCR was negative, C diff was negative. GI consulted and suspect an infectious source rather than inflammatory process with recommendation to f/u for colonoscopy 6-8 weeks after symptom resolution. Pt was repleted with IVF and electrolytes during hospital stay and bowel movements lessened. Stable for discharge with supportive care.      The patient is afebrile, hemodynamically stable and medically optimized for discharge to home with follow up with GI. On day of discharge, patient is clinically stable with no new exam findings or acute symptoms compared to prior. The patient was seen by the attending physician on the date of discharge and deemed stable and acceptable for discharge. The patient's chronic medical conditions were treated accordingly per the patient's home medication regimen. The patient's medication reconciliation (with changes made to chronic medications), follow up appointments, discharge orders, instructions, and significant lab and diagnostic studies are as noted.    Important Medication Changes and Reason:  - started on Bentyl TID  - started on lactobacillus BID with meals    Active or Pending Issues Requiring Follow-up:  - f/u GI outpatient    Advanced Directives:   [ x ] Full code  [ ] DNR  [ ] Hospice

## 2024-09-10 NOTE — PROGRESS NOTE ADULT - PROBLEM SELECTOR PLAN 2
- Cr 1.55 on admission (outpt records show Cr 0.6)  - likely pre-renal given dehydration and lack of PO intake  - c/w LR fluids  - obtain urine lytes, osmolality, urea, protein/Cr ratio  - monitor U/O - Cr 1.55 on admission (outpt records show Cr 0.6)  - likely pre-renal given dehydration and lack of PO intake  - c/w LR fluids  - obtain urine lytes, osmolality, urea, protein/Cr ratio  - monitor U/O  - 9/10 0.67 -> resovled

## 2024-09-10 NOTE — PROGRESS NOTE ADULT - PROBLEM SELECTOR PLAN 3
- Na 132 on admission  - likely 2/2 poor PO intake  - encourage PO intake - Na 132 on admission  - likely 2/2 poor PO intake  - encourage PO intake  - 9/10 140

## 2024-09-10 NOTE — DISCHARGE NOTE PROVIDER - NSDCFUADDAPPT_GEN_ALL_CORE_FT
APPTS READY TO BE MADE [X]    Best Family or Patient Contact (if needed):    Additional Information about above appointments (if needed):    1: GI within 2 months  2:   3:     Other comments or requests:

## 2024-09-10 NOTE — DISCHARGE NOTE PROVIDER - NSDCMRMEDTOKEN_GEN_ALL_CORE_FT
albuterol 90 mcg/inh inhalation powder: 2 puff(s) inhaled every 4 hours, As Needed  amLODIPine 10 mg oral tablet: 1 tab(s) orally once a day  carvedilol 6.25 mg oral tablet: 1 tab(s) orally once a day  ergocalciferol 1.25 mg (50,000 intl units) oral capsule: 1 cap(s) orally once a week  escitalopram 10 mg oral tablet: 1 tab(s) orally once a day (at bedtime)  famotidine 20 mg oral tablet: 1 tab(s) orally once a day  fluticasone 50 mcg/inh nasal spray: 1 spray(s) in each nostril once a day  montelukast 10 mg oral tablet: 1 tab(s) orally once a day  omeprazole 20 mg oral delayed release capsule: 1 cap(s) orally once a day  potassium chloride 10 mEq oral tablet, extended release: 1 tab(s) orally once a day  triamterene-hydrochlorothiazide 37.5 mg-25 mg oral capsule: 1 cap(s) orally once a day   albuterol 90 mcg/inh inhalation powder: 2 puff(s) inhaled every 4 hours, As Needed  amLODIPine 10 mg oral tablet: 1 tab(s) orally once a day  carvedilol 6.25 mg oral tablet: 1 tab(s) orally once a day  ergocalciferol 1.25 mg (50,000 intl units) oral capsule: 1 cap(s) orally once a week  escitalopram 10 mg oral tablet: 1 tab(s) orally once a day (at bedtime)  fluticasone 50 mcg/inh nasal spray: 1 spray(s) in each nostril once a day  montelukast 10 mg oral tablet: 1 tab(s) orally once a day  potassium chloride 10 mEq oral tablet, extended release: 1 tab(s) orally once a day  triamterene-hydrochlorothiazide 37.5 mg-25 mg oral capsule: 1 cap(s) orally once a day   albuterol 90 mcg/inh inhalation powder: 2 puff(s) inhaled every 4 hours, As Needed  aluminum hydroxide-magnesium hydroxide 200 mg-200 mg/5 mL oral suspension: 30 milliliter(s) orally every 4 hours as needed for Dyspepsia  amLODIPine 10 mg oral tablet: 1 tab(s) orally once a day  carvedilol 6.25 mg oral tablet: 1 tab(s) orally once a day  dicyclomine 10 mg oral capsule: 1 cap(s) orally 3 times a day  ergocalciferol 1.25 mg (50,000 intl units) oral capsule: 1 cap(s) orally once a week  escitalopram 10 mg oral tablet: 1 tab(s) orally once a day (at bedtime)  famotidine 20 mg oral tablet: 1 tab(s) orally once a day (at bedtime)  fluticasone 50 mcg/inh nasal spray: 1 spray(s) in each nostril once a day  lactobacillus acidophilus oral capsule: 1 tab(s) orally 2 times a day (with meals)  montelukast 10 mg oral tablet: 1 tab(s) orally once a day  omeprazole 20 mg oral delayed release capsule: 1 cap(s) orally once a day (in the morning)  potassium chloride 10 mEq oral tablet, extended release: 1 tab(s) orally once a day  triamterene-hydrochlorothiazide 37.5 mg-25 mg oral capsule: 1 cap(s) orally once a day

## 2024-09-10 NOTE — DISCHARGE NOTE PROVIDER - NSDCCPCAREPLAN_GEN_ALL_CORE_FT
PRINCIPAL DISCHARGE DIAGNOSIS  Diagnosis: Colitis  Assessment and Plan of Treatment: When you came into the hospital, you were having profuse diarrhea. We obtained a CT of your abdomen, which showed inflammation of your large intestine. We had ID on board and recommended IV antibiotics, which you received inpatient. We also sent off GI PCR and stool testing, which came back negative for bacterial infection. We repleted your electrolytes and gave you fluid to prevent dehydration. We asked GI to come on board and they recommended being careful with your diet and a colonoscopy outpatient in 6-8 weeks after symptom resolution.     PRINCIPAL DISCHARGE DIAGNOSIS  Diagnosis: Colitis  Assessment and Plan of Treatment: When you came into the hospital, you were having profuse diarrhea. We obtained a CT of your abdomen, which showed inflammation of your large intestine. We had ID on board and recommended IV antibiotics, which you received inpatient. We also sent off GI PCR and stool testing, which came back negative for bacterial infection. We repleted your electrolytes and gave you fluid to prevent dehydration. We asked GI to come on board and they recommended being careful with your diet and a colonoscopy outpatient in 6-8 weeks after symptom resolution. You will be discharged home on medications per GI recommendations to help with your GI tract.     PRINCIPAL DISCHARGE DIAGNOSIS  Diagnosis: Colitis  Assessment and Plan of Treatment: When you came into the hospital, you were having profuse diarrhea. We obtained a CT of your abdomen, which showed inflammation of your large intestine. We had ID on board and recommended IV antibiotics, which you received inpatient. We also sent off GI PCR and stool testing, which came back negative for bacterial infection. We repleted your electrolytes and gave you fluid to prevent dehydration. We asked GI to come on board and they recommended being careful with your diet and a colonoscopy outpatient in 6-8 weeks after symptom resolution. You will be discharged home on medications per GI recommendations to help with your GI tract: pantoprazole once every morning, pepcid once every night, Bentyl three times a day for 2 weeks.

## 2024-09-10 NOTE — DISCHARGE NOTE PROVIDER - DETAILS OF MALNUTRITION DIAGNOSIS/DIAGNOSES
This patient has been assessed with a concern for Malnutrition and was treated during this hospitalization for the following Nutrition diagnosis/diagnoses:     -  09/07/2024: Severe protein-calorie malnutrition

## 2024-09-10 NOTE — PROGRESS NOTE ADULT - PROBLEM SELECTOR PLAN 1
- GI PCR (-)  - Stool culture- NGTD  - f/u calprotectin  - f/u salmonella  - f/u shigella  - switched Zosyn to ceftriaxone and flagyl per ID recs  - will hold off on anti-diarrheals until infectious causes are ruled out  - K 3.2 i/s/o diarrhea and poor po intake  - repleted k and started on 1/2 NS + 20 meq KCl 100cc/hr 24 hr  - gastroenterology consulted - GI PCR (-)  - Stool culture- NGTD  - f/u calprotectin, salmonella, shigella  - c/w ceftriaxone and flagyl per ID recs for 5 days  - will hold off on anti-diarrheals until infectious causes are ruled out  - K 3.7  i/s/o diarrhea and poor po intake  - repleted k and started on 1/2 NS + 20 meq KCl 100cc/hr 24 hr  - gastroenterology rec   1) trail bentyl TID (anti-spasmodic)   2) probiotic   3) c/w pepcid & ppi   4) outpatient colonoscopy

## 2024-09-11 ENCOUNTER — TRANSCRIPTION ENCOUNTER (OUTPATIENT)
Age: 59
End: 2024-09-11

## 2024-09-11 VITALS
OXYGEN SATURATION: 96 % | TEMPERATURE: 98 F | SYSTOLIC BLOOD PRESSURE: 147 MMHG | HEART RATE: 81 BPM | DIASTOLIC BLOOD PRESSURE: 86 MMHG | RESPIRATION RATE: 18 BRPM

## 2024-09-11 LAB
ANION GAP SERPL CALC-SCNC: 11 MMOL/L — SIGNIFICANT CHANGE UP (ref 5–17)
BUN SERPL-MCNC: <4 MG/DL — LOW (ref 7–23)
CALCIUM SERPL-MCNC: 9.2 MG/DL — SIGNIFICANT CHANGE UP (ref 8.4–10.5)
CHLORIDE SERPL-SCNC: 106 MMOL/L — SIGNIFICANT CHANGE UP (ref 96–108)
CO2 SERPL-SCNC: 24 MMOL/L — SIGNIFICANT CHANGE UP (ref 22–31)
CREAT SERPL-MCNC: 0.62 MG/DL — SIGNIFICANT CHANGE UP (ref 0.5–1.3)
CULTURE RESULTS: SIGNIFICANT CHANGE UP
E COLI SXT STL QL IA: NEGATIVE — SIGNIFICANT CHANGE UP
EGFR: 103 ML/MIN/1.73M2 — SIGNIFICANT CHANGE UP
GLUCOSE SERPL-MCNC: 100 MG/DL — HIGH (ref 70–99)
HCT VFR BLD CALC: 36.5 % — SIGNIFICANT CHANGE UP (ref 34.5–45)
HGB BLD-MCNC: 11.8 G/DL — SIGNIFICANT CHANGE UP (ref 11.5–15.5)
MAGNESIUM SERPL-MCNC: 2.1 MG/DL — SIGNIFICANT CHANGE UP (ref 1.6–2.6)
MCHC RBC-ENTMCNC: 29.6 PG — SIGNIFICANT CHANGE UP (ref 27–34)
MCHC RBC-ENTMCNC: 32.3 GM/DL — SIGNIFICANT CHANGE UP (ref 32–36)
MCV RBC AUTO: 91.5 FL — SIGNIFICANT CHANGE UP (ref 80–100)
NRBC # BLD: 0 /100 WBCS — SIGNIFICANT CHANGE UP (ref 0–0)
PHOSPHATE SERPL-MCNC: 2.5 MG/DL — SIGNIFICANT CHANGE UP (ref 2.5–4.5)
PLATELET # BLD AUTO: 373 K/UL — SIGNIFICANT CHANGE UP (ref 150–400)
POTASSIUM SERPL-MCNC: 3.4 MMOL/L — LOW (ref 3.5–5.3)
POTASSIUM SERPL-SCNC: 3.4 MMOL/L — LOW (ref 3.5–5.3)
RBC # BLD: 3.99 M/UL — SIGNIFICANT CHANGE UP (ref 3.8–5.2)
RBC # FLD: 13.9 % — SIGNIFICANT CHANGE UP (ref 10.3–14.5)
SODIUM SERPL-SCNC: 141 MMOL/L — SIGNIFICANT CHANGE UP (ref 135–145)
SPECIMEN SOURCE: SIGNIFICANT CHANGE UP
WBC # BLD: 9.35 K/UL — SIGNIFICANT CHANGE UP (ref 3.8–10.5)
WBC # FLD AUTO: 9.35 K/UL — SIGNIFICANT CHANGE UP (ref 3.8–10.5)

## 2024-09-11 PROCEDURE — 87449 NOS EACH ORGANISM AG IA: CPT

## 2024-09-11 PROCEDURE — 87186 SC STD MICRODIL/AGAR DIL: CPT

## 2024-09-11 PROCEDURE — 87046 STOOL CULTR AEROBIC BACT EA: CPT

## 2024-09-11 PROCEDURE — 87040 BLOOD CULTURE FOR BACTERIA: CPT

## 2024-09-11 PROCEDURE — 99285 EMERGENCY DEPT VISIT HI MDM: CPT

## 2024-09-11 PROCEDURE — 81001 URINALYSIS AUTO W/SCOPE: CPT

## 2024-09-11 PROCEDURE — 82570 ASSAY OF URINE CREATININE: CPT

## 2024-09-11 PROCEDURE — 82435 ASSAY OF BLOOD CHLORIDE: CPT

## 2024-09-11 PROCEDURE — 84156 ASSAY OF PROTEIN URINE: CPT

## 2024-09-11 PROCEDURE — 99239 HOSP IP/OBS DSCHRG MGMT >30: CPT | Mod: GC

## 2024-09-11 PROCEDURE — 82340 ASSAY OF CALCIUM IN URINE: CPT

## 2024-09-11 PROCEDURE — 87150 DNA/RNA AMPLIFIED PROBE: CPT

## 2024-09-11 PROCEDURE — 80053 COMPREHEN METABOLIC PANEL: CPT

## 2024-09-11 PROCEDURE — 99233 SBSQ HOSP IP/OBS HIGH 50: CPT

## 2024-09-11 PROCEDURE — 84100 ASSAY OF PHOSPHORUS: CPT

## 2024-09-11 PROCEDURE — 85014 HEMATOCRIT: CPT

## 2024-09-11 PROCEDURE — C8929: CPT

## 2024-09-11 PROCEDURE — 87427 SHIGA-LIKE TOXIN AG IA: CPT

## 2024-09-11 PROCEDURE — 84540 ASSAY OF URINE/UREA-N: CPT

## 2024-09-11 PROCEDURE — 83935 ASSAY OF URINE OSMOLALITY: CPT

## 2024-09-11 PROCEDURE — 84105 ASSAY OF URINE PHOSPHORUS: CPT

## 2024-09-11 PROCEDURE — 36415 COLL VENOUS BLD VENIPUNCTURE: CPT

## 2024-09-11 PROCEDURE — 84295 ASSAY OF SERUM SODIUM: CPT

## 2024-09-11 PROCEDURE — 83690 ASSAY OF LIPASE: CPT

## 2024-09-11 PROCEDURE — 83605 ASSAY OF LACTIC ACID: CPT

## 2024-09-11 PROCEDURE — 87507 IADNA-DNA/RNA PROBE TQ 12-25: CPT

## 2024-09-11 PROCEDURE — 82436 ASSAY OF URINE CHLORIDE: CPT

## 2024-09-11 PROCEDURE — 82947 ASSAY GLUCOSE BLOOD QUANT: CPT

## 2024-09-11 PROCEDURE — 74177 CT ABD & PELVIS W/CONTRAST: CPT | Mod: MC

## 2024-09-11 PROCEDURE — 85025 COMPLETE CBC W/AUTO DIFF WBC: CPT

## 2024-09-11 PROCEDURE — 87086 URINE CULTURE/COLONY COUNT: CPT

## 2024-09-11 PROCEDURE — 83993 ASSAY FOR CALPROTECTIN FECAL: CPT

## 2024-09-11 PROCEDURE — 94640 AIRWAY INHALATION TREATMENT: CPT

## 2024-09-11 PROCEDURE — 86768 SALMONELLA ANTIBODY: CPT

## 2024-09-11 PROCEDURE — 96375 TX/PRO/DX INJ NEW DRUG ADDON: CPT

## 2024-09-11 PROCEDURE — 87045 FECES CULTURE AEROBIC BACT: CPT

## 2024-09-11 PROCEDURE — 85018 HEMOGLOBIN: CPT

## 2024-09-11 PROCEDURE — 85027 COMPLETE CBC AUTOMATED: CPT

## 2024-09-11 PROCEDURE — 84132 ASSAY OF SERUM POTASSIUM: CPT

## 2024-09-11 PROCEDURE — 83735 ASSAY OF MAGNESIUM: CPT

## 2024-09-11 PROCEDURE — 87077 CULTURE AEROBIC IDENTIFY: CPT

## 2024-09-11 PROCEDURE — 87324 CLOSTRIDIUM AG IA: CPT

## 2024-09-11 PROCEDURE — 84300 ASSAY OF URINE SODIUM: CPT

## 2024-09-11 PROCEDURE — 96374 THER/PROPH/DIAG INJ IV PUSH: CPT

## 2024-09-11 PROCEDURE — 82803 BLOOD GASES ANY COMBINATION: CPT

## 2024-09-11 PROCEDURE — 80048 BASIC METABOLIC PNL TOTAL CA: CPT

## 2024-09-11 PROCEDURE — 82330 ASSAY OF CALCIUM: CPT

## 2024-09-11 PROCEDURE — 84133 ASSAY OF URINE POTASSIUM: CPT

## 2024-09-11 RX ORDER — DICYCLOMINE HCL 20 MG
1 TABLET ORAL
Qty: 42 | Refills: 0
Start: 2024-09-11 | End: 2024-09-24

## 2024-09-11 RX ORDER — FAMOTIDINE 10 MG/ML
1 INJECTION INTRAVENOUS
Refills: 0 | DISCHARGE

## 2024-09-11 RX ORDER — POTASSIUM CHLORIDE 10 MEQ
40 TABLET, EXT RELEASE, PARTICLES/CRYSTALS ORAL ONCE
Refills: 0 | Status: COMPLETED | OUTPATIENT
Start: 2024-09-11 | End: 2024-09-11

## 2024-09-11 RX ORDER — ONDANSETRON 2 MG/ML
1 INJECTION, SOLUTION INTRAMUSCULAR; INTRAVENOUS
Qty: 2 | Refills: 0
Start: 2024-09-11 | End: 2024-09-24

## 2024-09-11 RX ORDER — L.ACID,PARA/B.BIFIDUM/S.THERM 8B CELL
1 CAPSULE ORAL
Qty: 60 | Refills: 0
Start: 2024-09-11 | End: 2024-10-10

## 2024-09-11 RX ORDER — MAGNESIUM, ALUMINUM HYDROXIDE 200-225/5
30 SUSPENSION, ORAL (FINAL DOSE FORM) ORAL
Qty: 5400 | Refills: 0
Start: 2024-09-11 | End: 2024-10-10

## 2024-09-11 RX ORDER — OMEPRAZOLE 40 MG/1
1 CAPSULE, DELAYED RELEASE ORAL
Qty: 30 | Refills: 0
Start: 2024-09-11 | End: 2024-10-10

## 2024-09-11 RX ORDER — FAMOTIDINE 10 MG/ML
1 INJECTION INTRAVENOUS
Qty: 30 | Refills: 0
Start: 2024-09-11 | End: 2024-10-10

## 2024-09-11 RX ADMIN — MONTELUKAST SODIUM 10 MILLIGRAM(S): 5 TABLET, CHEWABLE ORAL at 11:56

## 2024-09-11 RX ADMIN — ONDANSETRON 4 MILLIGRAM(S): 2 INJECTION, SOLUTION INTRAMUSCULAR; INTRAVENOUS at 06:57

## 2024-09-11 RX ADMIN — Medication 10 MILLIGRAM(S): at 06:53

## 2024-09-11 RX ADMIN — Medication 30 MILLILITER(S): at 00:44

## 2024-09-11 RX ADMIN — Medication 1 TABLET(S): at 08:08

## 2024-09-11 RX ADMIN — Medication 30 MILLILITER(S): at 06:58

## 2024-09-11 RX ADMIN — FLUTICASONE PROPIONATE 1 SPRAY(S): 50 SPRAY, METERED NASAL at 06:50

## 2024-09-11 RX ADMIN — Medication 100 MILLIGRAM(S): at 06:53

## 2024-09-11 RX ADMIN — ACETAMINOPHEN 650 MILLIGRAM(S): 325 TABLET ORAL at 07:55

## 2024-09-11 RX ADMIN — AMLODIPINE BESYLATE 10 MILLIGRAM(S): 10 TABLET ORAL at 06:54

## 2024-09-11 RX ADMIN — Medication 40 MILLIGRAM(S): at 06:54

## 2024-09-11 RX ADMIN — ACETAMINOPHEN 650 MILLIGRAM(S): 325 TABLET ORAL at 06:57

## 2024-09-11 RX ADMIN — CARVEDILOL 3.12 MILLIGRAM(S): 6.25 TABLET ORAL at 06:53

## 2024-09-11 RX ADMIN — FAMOTIDINE 20 MILLIGRAM(S): 10 INJECTION INTRAVENOUS at 11:56

## 2024-09-11 RX ADMIN — Medication 40 MILLIEQUIVALENT(S): at 11:55

## 2024-09-11 RX ADMIN — ACETAMINOPHEN 650 MILLIGRAM(S): 325 TABLET ORAL at 00:30

## 2024-09-11 RX ADMIN — Medication 10 MILLIGRAM(S): at 11:55

## 2024-09-11 NOTE — DISCHARGE NOTE NURSING/CASE MANAGEMENT/SOCIAL WORK - PATIENT PORTAL LINK FT
You can access the FollowMyHealth Patient Portal offered by [facility] by registering at the following website: [email or web address]. By joining [facility]’s FollowMyHealth portal, you will also be able to view your health information using other applications (apps) compatible with our system.

## 2024-09-11 NOTE — PROGRESS NOTE ADULT - ASSESSMENT
Pt is 58 y.o. F w/ PMH of HTN, asthma, heaptic steatosis, sp ccy and GERD who presents for acute gastroenteritis - acute onset 9/3  recent COVID infection 2 weeks ago  +recent Abx use (amoxicillin + Azithromycin)  BCx + coag negative staph 2/4 bottles (same set)  CT AP w/ mild pancolitis, GI PCR and C diff negative  Stool Cx NGTD  TTE- no valvular vegetations    #nausea/vomiting - improved  #diarrhea w/ mild pancolitis on CT- likely infectious etio ?viral  Stool culture negative  GI PCR, C diff negative  elevated fecal calprotectin - suspect elevated 2/2 acute inflammation/colitis. Given acute onset of symptoms, low suspicion for IBD etio.  can assess for any underlying IBD at outpt colonoscopy ~8 weeks    -dc Abx  -continue  bentyl TID (anti-spasmodic)  -continue probiotic BID  -continue low lactose LRD, monitor clinically  -outpt Colonoscopy in ~8 weeks  -continue Pepcid and PPI as ordered    Discussed with pt; all questions answered  Discussed with p; all questions answered  Discussed with Dr Cisse.  will contact pt to schedule 2 week follow up  no GI objection to DC    Cosme Solis PA-C  [facility] Non Teaching GI Service  Available on TEAMS Mon-Fri 8a-4p  After hours and weekend coverage [phone]  
Pt is 58 y.o. F w/ PMH of HTN, asthma, heaptic steatosis, sp ccy and GERD who presents for acute gastroenteritis - acute onset 9/3  recent COVID infection 2 weeks ago  +recent Abx use (amoxicillin + Azithromycin)  BCx + coag negative staph 2/4 bottles (same set)  CT AP w/ mild pancolitis, GI PCR and C diff negative  Stool Cx NGTD  TTE- no valvular vegetations    #nausea/vomiting - improved  #diarrhea w/ mild pancolitis on CT- likely infectious etio ?viral  f/u stool Cx  GI PCR, C diff negative    -Abx per ID  -trail bentyl TID (anti-spasmodic)  -continue probiotic BID  -continue low lactose LRD, monitor clinically  -monitor stooling and lytes/ replete PRN  -follow up final Stool Cx  -Fecal calprotectin testing; low suspicion for inflammatory etio given acute onset of symptoms  -outpt Colonoscopy in 6-8 weeks following resolution  -continue Pepcid and PPI as ordered    Discussed with pt; all questions answered    Cosme Solis PA-C  [facility] Teaching GI Service  Available on TEAMS Mon-Fri 8a-4p  After hours and weekend coverage [phone]      
58-year-old female with a past medical Struve hypertension, asthma, GERD who is admitted to the hospital due to acute diarrhea.    Patient reports diarrhea for about 5 days.  Reports diarrhea is nonbloody.  Also reports nausea with vomiting.  Reports no blood in vomitus, vomitus is nonbilious as well.  Patient reports recent weight loss, headaches, dizziness.  Reports subjective fever/chills.     Patient reports recent COVID infection about 2 weeks prior to admission.  Reports also recent antibiotic use during that time including amoxicillin and azithromycin.  Patient also reports attending multiple barbecues throughout the Labor Day weekend.  Denies eating any undercooked meats or other food items.    In the ED, patienat is afebrile.  Latest labs show no leukocytosis, BMP with renal function within normal limits, hepatic function with AST 62, ALT 73.  Urinalysis without evidence for infection.  Blood culture with coagulase-negative staph in 2 out of 4 bottles, same set.  Urine culture also with the gram-positive cocci in pairs and chains.  CT abdomen/pelvis shows pancolitis.  Stool culture is pending.  C. difficile obtained on 9/8/2024 is negative, GI PCR obtained on 9/6/2024 is negative.    #Abnormal imaging of the abdomen  #Diarrhea  #Pancolitis  #Positive blood cultures    Recommendations  Complete antibiotic course today then monitor off  Thus far no infectious etiology established for patient's diarrhea, stool cultures pending , C.diff negative - GI PCR negative  Blood culture with coagulase-negative staph in 1 set, likely contamination–patient does not have any hardware, TTE without evidence for valvular vegetation, repeat bcx negative to date  Follow repeat blood cultures  GI input for noninfectious diarrhea  Follow fever curve and WBC count    ID to sign off. Please contact as issues arise.    Serafin Broussard MD  Division of Infectious Diseases
58-year-old female with a past medical Struve hypertension, asthma, GERD who is admitted to the hospital due to acute diarrhea.    Patient reports diarrhea for about 5 days.  Reports diarrhea is nonbloody.  Also reports nausea with vomiting.  Reports no blood in vomitus, vomitus is nonbilious as well.  Patient reports recent weight loss, headaches, dizziness.  Reports subjective fever/chills.     Patient reports recent COVID infection about 2 weeks prior to admission.  Reports also recent antibiotic use during that time including amoxicillin and azithromycin.  Patient also reports attending multiple barbecues throughout the Labor Day weekend.  Denies eating any undercooked meats or other food items.    In the ED, patienat is afebrile.  Latest labs show no leukocytosis, BMP with renal function within normal limits, hepatic function with AST 62, ALT 73.  Urinalysis without evidence for infection.  Blood culture with coagulase-negative staph in 2 out of 4 bottles, same set.  Urine culture also with the gram-positive cocci in pairs and chains.  CT abdomen/pelvis shows pancolitis.  Stool culture is pending.  C. difficile obtained on 9/8/2024 is negative, GI PCR obtained on 9/6/2024 is negative.    #Abnormal imaging of the abdomen  #Diarrhea  #Pancolitis  #Positive blood cultures    Recommendations  Continue ceftriaxone and metronidazole, monitor for improvement– would limit course to 5 days  Thus far no infectious etiology established for patient's diarrhea, stool cultures pending , C.diff negative - GI PCR negative  Blood culture with coagulase-negative staph in 1 set, likely contamination–patient does not have any hardware, TTE without evidence for valvular vegetation  Follow repeat blood cultures  Follow fever curve and WBC count    Serafin Broussard MD  Division of Infectious Diseases
Pt is 58 y.o. F w/ PMH of HTN, asthma, and GERD who presents for acute non-bloody diarrhea a/w nausea and non-bloody, non-bilious vomiting x 5 days. Also with recent weight loss, headaches, dizziness, body pains, subjective fevers/chills. CTAP showed hepatic steatosis and pancolitis. S/p one-time dose of Unasyn. GI PCR (-), BCx, UCx pending results.

## 2024-09-11 NOTE — PROGRESS NOTE ADULT - PROBLEM SELECTOR PROBLEM 2
MONICA (acute kidney injury)

## 2024-09-11 NOTE — PROGRESS NOTE ADULT - PROBLEM SELECTOR PLAN 1
- GI PCR (-)  - Stool culture- NGTD  - f/u calprotectin, salmonella, shigella  - c/w ceftriaxone and flagyl per ID recs for 5 days  - will hold off on anti-diarrheals until infectious causes are ruled out  - K 3.7  i/s/o diarrhea and poor po intake  - repleted k and started on 1/2 NS + 20 meq KCl 100cc/hr 24 hr  - gastroenterology rec   1) trail bentyl TID (anti-spasmodic)   2) probiotic   3) c/w pepcid & ppi   4) outpatient colonoscopy - GI PCR (-)  - Stool culture- NGTD  - f/u salmonella, shigella  - c/w ceftriaxone and flagyl per ID recs for 5 days  - will hold off on anti-diarrheals until infectious causes are ruled out  - calprotectin: 3,340  - K 3.4  i/s/o diarrhea and poor po intake  - Diarrhea much improved and less frequent     - gastroenterology rec   1) bentyl TID (anti-spasmodic)   2) probiotic   3) c/w pepcid & ppi   4) outpatient colonoscopy

## 2024-09-11 NOTE — PROGRESS NOTE ADULT - PROBLEM SELECTOR PLAN 2
- Cr 1.55 on admission (outpt records show Cr 0.6)  - likely pre-renal given dehydration and lack of PO intake  - c/w LR fluids  - obtain urine lytes, osmolality, urea, protein/Cr ratio  - monitor U/O  - 9/10 0.67 -> resovled - Cr 1.55 on admission (outpt records show Cr 0.6)  - likely pre-renal given dehydration and lack of PO intake  - c/w LR fluids  - obtain urine lytes, osmolality, urea, protein/Cr ratio  - monitor U/O  - 9/10 0.67 -> resolved

## 2024-09-11 NOTE — PROGRESS NOTE ADULT - PROBLEM SELECTOR PLAN 3
- Na 132 on admission  - likely 2/2 poor PO intake  - encourage PO intake  - 9/10 140 - Na 132 on admission  - likely 2/2 poor PO intake  - encourage PO intake  - 9/11 141

## 2024-09-11 NOTE — PROGRESS NOTE ADULT - ATTENDING COMMENTS
Patient here with profuse diarrhea, ct findings consistent pancolitis- continues to have profuse diarrhea  - bcx 8/6 with staph warneri, repeat bcx 8/9  - c/w zosyn, start vancomycin  - TTE pending  - ID consult today  - salmonella, shigela sent  - c diff pending  - stool culture and calprotectin pending  - GI Dr Cisse consulted    hypokalemia  - repeat bmp pending  - c/w 1/2NS 100 cc/hr + KCL      Janet Alston D.O.  Division of Hospital Medicine  Available on MS Teams.
Patient here with profuse diarrhea, ct findings consistent pancolitis.  - DC unasyn, start zosyn  - check for salmonella, shigela  - cdiff negative  - stool culture and calprotectin pending  - GI Dr Cisse consulted    hypokalemia  - c/w 1/2NS 100 cc/hr + KCL  - K 2.5-- replete aggressively    Janet Alston D.O.  Division of Hospital Medicine  Available on MS Teams
Patient here with profuse diarrhea, CT findings consistent pancolitis    - continues to have profuse diarrhea  - bcx 8/6 with gpc  likely contaminant, f/u repeat bcx 8/9  - on ceftriaxone and flagyl  - discussed with GI team at bedside, trial low residue diet, dietician eval (patient with history of multiple food intolerances), start probiotic.  - f/u stool cx. gi pcr neg, c diff neg.  - outpatient GI f/u for colonoscopy   - pepcid, PPI    hypokalemia  - monitor bmp. replace K as needed.  - poor po intake, continue IVF
Patient presented here with profuse diarrhea, CT findings consistent pancolitis    - continues to have water diarrhea today  - bcx 8/6 with gpc likely contaminant, f/u repeat bcx 8/9. TTE neg for vegetation.  - on ceftriaxone and flagyl - plan for 5 day course, f/u ID team for further recs.   - discussed with GI team at bedside, trial low residue low lactose diet, dietician geovanny (patient with history of multiple food intolerances), started probiotic. bentyl prn.  - f/u stool cx. gi pcr neg, c diff neg.  - outpatient GI f/u for colonoscopy   - pepcid, PPI    hypokalemia  - monitor bmp. replace K as needed.  - poor po intake, continue IVF.     - discharge planning, anticipate home once diarrhea improves.
Patient presented here with profuse diarrhea, CT findings consistent pancolitis    - continues to have water diarrhea today  - bcx 8/6 with gpc likely contaminant, f/u repeat bcx 8/9. TTE neg for vegetation.  - was on ceftriaxone and flagyl -discussed with GI team 9/11, stop abx.  - discussed with GI team at bedside, trial low residue low lactose diet, dietician geovanny (patient with history of multiple food intolerances), started probiotic. bentyl prn.  - f/u stool cx. gi pcr neg, c diff neg.  - outpatient GI f/u for colonoscopy   - pepcid, PPI    hypokalemia  - monitor bmp. replace K as needed.  - poor po intake, continue IVF.     - discharge planning 55mins, patient states she is feeling better today, diarrhea improving, she wants to go home.

## 2024-09-11 NOTE — PROGRESS NOTE ADULT - NS ATTEND AMEND GEN_ALL_CORE FT
58yF with diarrhea getting better  stool neg for PCR and c.diff  ?viral  cont probiotics and bentyl  OP colonoscopy

## 2024-09-11 NOTE — CHART NOTE - NSCHARTNOTEFT_GEN_A_CORE
NUTRITION FOLLOW UP NOTE    PATIENT SEEN FOR: Dietitian consult for nutrition services and assessment, malnutrition follow up     SOURCE: [x] Patient  [x] Current Medical Record  [] RN  [] Family/support person at bedside  [] Patient unavailable/inappropriate  [] Other:    CHART REVIEWED/EVENTS NOTED.  [] No changes to nutrition care plan to note  [] Nutrition Status:    DIET ORDER:   Diet, DASH/TLC:   Sodium & Cholesterol Restricted  Fiber/Residue Restricted (LOWFIBER)  Lactose Restricted (Milk Sugar Intoler.)  Nut Restricted  Egg Restricted  No Dairy  No Egg  No Lactose  No Nuts (09-07-24 @ 04:25) [Active]      CURRENT DIET ORDER IS:  [] Appropriate:  [] Inadequate:  [x] Other: Please see below for dietitian recommendations     NUTRITION INTAKE/PROVISION:  [x] PO: Per nursing flowsheets fair/good appetite during admission, %. Pt visited today 9/11/24. Per Pt, poor appetite. Breakfast tray items observed during visit, < 75% consumed. Offered oral nutrition shake to optimize PO intake. Pt accepting.   [] Enteral Nutrition:  [] Parenteral Nutrition:    Weights: No recent available weights to review.     MEDICATIONS  (STANDING):  amLODIPine   Tablet  carvedilol  dicyclomine  famotidine    Tablet  pantoprazole    Tablet    Pertinent Labs: 09-11 @ 08:24: Na 141, BUN <4<L>, Cr 0.62, <H>, K+ 3.4<L>, Phos 2.5, Mg 2.1, Alk Phos --, ALT/SGPT --, AST/SGOT --, HbA1c --      NUTRITIONALLY PERTINENT MEDICATIONS/LABS:  [x] Reviewed  [x] Relevant notes on medications/labs:  - Antibiotics in use. Lactobacillus Acidophilus ordered.  - K 3.4 (L). Hypokalemia noted during admission. Potassium chloride ordered for repletion.   - Hypophosphatemia and hyponatremia noted during admission. Now resolved.   - BUN < 4 (L)    EDEMA:  [x] Reviewed: Per nursing flowsheets, +1 generalized edema noted  [] Relevant notes:    GI/ I&O:  [x] Reviewed: Per Pt, last BM today 9/11/2024. Pt reports nausea during visit. Zofran ordered.   [] Relevant notes:  [] Other:    SKIN:   [x] No pressure injuries documented, per nursing flowsheet  [] Pressure injury previously noted  [] Change in pressure injury documentation:  [] Other:    ESTIMATED NEEDS:  [] No change:  [x] Updated:  Energy: 1635-1908kcals/day (30-35kcal/kg)  Protein: 65-82g/day (1.2-1.5g/kg)  Fluid:   ml/day or [x] defer to team  Based on: Ideal body weight 54.5kg    NUTRITION DIAGNOSIS:  [x] Prior Dx: Acute severe protein-calorie malnutrition   [] New Dx:    EDUCATION:  [x] Yes: Reviewed the importance of protein rich foods and oral nutrition supplement compliance to optimize PO intake. Pt demonstrated a fair level of understanding. RD remains available should additional diet education become indicated.   [] Not appropriate/warranted    NUTRITION CARE PLAN:  1. Recommend d/c DASH/TLC and liberalize to low sodium diet order. Continue low fiber, lactose restricted diet.   2. Recommend [employer] 1.0 BID (325kcals, 16g protein per 325mL) to optimize PO intake.  3. Continue Lactobacillus Acidophilus while Pt remains on Antibiotics.   4. Replete electrolytes prn per medical team discretion.   5. Monitor routine weights, nutrition related labs, PO intake and tolerance, oral nutrition supplement compliance, and skin integrity.     [] Achieved - Continue current nutrition intervention(s)  [] Current medical condition precludes nutrition intervention at this time.    MONITORING AND EVALUATION:   RD remains available upon request and will follow up per protocol.    Shanta Benjamin RD, CDN   Available on teams

## 2024-09-11 NOTE — PROGRESS NOTE ADULT - PROBLEM SELECTOR PLAN 7
- DVT PPX: SCD's  - GI PPX: Protonix, home famotidine  - Dispo: likely home pending clinical course

## 2024-09-11 NOTE — PROGRESS NOTE ADULT - PROBLEM SELECTOR PLAN 6
- c/w home Lexapro 10mg QHS

## 2024-09-11 NOTE — PROGRESS NOTE ADULT - PROVIDER SPECIALTY LIST ADULT
Gastroenterology
Infectious Disease
Gastroenterology
Infectious Disease
Internal Medicine

## 2024-09-11 NOTE — PROGRESS NOTE ADULT - SUBJECTIVE AND OBJECTIVE BOX
Follow Up:  diarrhea    Interval History/ROS:  Overnight: No acute events.  Patient remains afebrile.  Otherwise hemodynamically stable on room air.  Latest labs show no leukocytosis, BMP with renal function within normal limits.  Blood cultures from 9/9/2022 for are negative to date.    Seen examined at bedside.  Continues to complain of diarrhea.  Denies any other new pain or discomfort.  Allergies  cinnamon (Unknown)  Cucumbers,  Squash (all varieties) (Pruritus; Urticaria)  Tree Nuts (Short breath)  eggs (Rash)  shea butter (Unknown)  aspirin (Other)  [place] (Unknown)  Cashews (Unknown)  latex (Pruritus; Rash)        ANTIMICROBIALS:  cefTRIAXone   IVPB 1000 every 24 hours  metroNIDAZOLE  IVPB 500 every 8 hours  metroNIDAZOLE  IVPB        OTHER MEDS:  MEDICATIONS  (STANDING):  acetaminophen     Tablet .. 650 every 6 hours PRN  albuterol    90 MICROgram(s) HFA Inhaler 2 every 6 hours PRN  aluminum hydroxide/magnesium hydroxide/simethicone Suspension 30 every 6 hours PRN  amLODIPine   Tablet 10 daily  carvedilol 3.125 every 12 hours  dicyclomine 10 three times a day before meals  enoxaparin Injectable 40 every 24 hours  escitalopram 10 daily  famotidine    Tablet 20 daily  montelukast 10 daily  ondansetron Injectable 4 every 8 hours PRN  pantoprazole    Tablet 40 before breakfast      Vital Signs Last 24 Hrs  T(C): 36.6 (10 Sep 2024 12:15), Max: 36.8 (09 Sep 2024 20:29)  T(F): 97.8 (10 Sep 2024 12:15), Max: 98.2 (09 Sep 2024 20:29)  HR: 66 (10 Sep 2024 12:15) (62 - 69)  BP: 138/90 (10 Sep 2024 12:15) (128/84 - 145/96)  BP(mean): --  RR: 18 (10 Sep 2024 12:15) (18 - 18)  SpO2: 96% (10 Sep 2024 12:15) (96% - 97%)    Parameters below as of 10 Sep 2024 12:15  Patient On (Oxygen Delivery Method): room air        PHYSICAL EXAM:  Constitutional: non-toxic, no distress  HEAD/EYES: anicteric, no conjunctival injection  ENT:  supple, no thrush  Cardiovascular:   normal S1, S2, no murmur, no edema  Respiratory:  clear BS bilaterally, no wheezes, no rales  GI:  soft, non-tender, normal bowel sounds  :  no kennedy, no CVA tenderness  Musculoskeletal:  no synovitis, normal ROM  Neurologic: awake and alert, normal strength, no focal findings  Skin:  no rash, no erythema, no phlebitis  Heme/Onc: no lymphadenopathy   Psychiatric:  awake, alert, appropriate mood                                11.8   7.65  )-----------( 332      ( 10 Sep 2024 09:57 )             35.9       09-10    140  |  107  |  4<L>  ----------------------------<  118<H>  3.7   |  22  |  0.67    Ca    9.0      10 Sep 2024 09:58  Phos  2.1     09-10  Mg     2.1     09-10        Urinalysis Basic - ( 10 Sep 2024 09:58 )    Color: x / Appearance: x / SG: x / pH: x  Gluc: 118 mg/dL / Ketone: x  / Bili: x / Urobili: x   Blood: x / Protein: x / Nitrite: x   Leuk Esterase: x / RBC: x / WBC x   Sq Epi: x / Non Sq Epi: x / Bacteria: x        MICROBIOLOGY:  v    Culture - Blood (collected 09 Sep 2024 09:10)  Source: .Blood Blood-Peripheral  Preliminary Report (10 Sep 2024 14:02):    No growth at 24 hours    Culture - Blood (collected 09 Sep 2024 05:50)  Source: .Blood Blood-Peripheral  Preliminary Report (10 Sep 2024 11:01):    No growth at 24 hours    Culture - Stool (collected 07 Sep 2024 07:04)  Source: .Stool alejandra aniket  Preliminary Report (08 Sep 2024 20:04):    No enteric pathogens to date: Final culture pending    Culture - Blood (collected 06 Sep 2024 16:00)  Source: .Blood Blood-Peripheral  Preliminary Report (09 Sep 2024 20:01):    No growth at 72 Hours    Culture - Urine (collected 06 Sep 2024 15:42)  Source: Clean Catch Clean Catch (Midstream)  Final Report (09 Sep 2024 19:56):    10,000 - 49,000 CFU/mL Enterococcus faecalis    <10,000 CFU/ml Normal Urogenital luis present  Organism: Enterococcus faecalis (09 Sep 2024 19:56)  Organism: Enterococcus faecalis (09 Sep 2024 19:56)      -  Levofloxacin: S 1      -  Nitrofurantoin: S <=32 Should not be used to treat pyelonephritis.      -  Vancomycin: S 2      -  Ciprofloxacin: S <=1      -  Ampicillin: S <=2 Predicts results to ampicillin/sulbactam, amoxacillin-clavulanate and  piperacillin-tazobactam.      -  Tetracycline: R >8      Method Type: KATLYN    Culture - Blood (collected 06 Sep 2024 15:00)  Source: .Blood Blood-Peripheral  Gram Stain (08 Sep 2024 06:23):    Growth in aerobic bottle: Gram Positive Cocci in Clusters    Growth in anaerobic bottle: Gram Positive Cocci in Clusters  Final Report (09 Sep 2024 11:39):    Growth in aerobic bottle: Staphylococcus warneri    Growth in anaerobic bottle: Staphylococcus capitis    Isolation of Coagulase negative Staphylococcus from single blood culture    sets may represent    contamination. Contact the Microbiology Department at [phone] if    susceptibility testing is    clinically indicated.    Direct identification is available within approximately 3-5    hours either by Blood Panel Multiplexed PCR or Direct    MALDI-TOF. Details: [email or web address]  Organism: Blood Culture PCR (09 Sep 2024 11:39)  Organism: Blood Culture PCR (09 Sep 2024 11:39)      -  Coagulase negative Staphylococcus: Detec      Method Type: PCR                Clostridium difficile GDH Toxins A&amp;B, EIA:   Negative (09-08-24 @ 13:15)  Clostridium difficile GDH Interpretation: Negative for toxigenic C. Difficile.  This specimen is negative for C.  Difficile glutamate dehydrogenase (GDH) antigen and negative for C.  Difficile Toxins A & B, by EIA.  GDH is a highly sensitive screening  marker for C. Difficile that is produced in large amounts by all C.  Difficile strains, both toxigenic and nontoxigenic.  This assay has not  been validated as a test of cure.  Repeat testing during the same episode  of diarrhea is of limited value and is discouraged.  The results of this  assay should always be interpreted in conjunction with patient's clinical  history. (09-08-24 @ 13:15)      RADIOLOGY:  Imaging reviewed
  Follow Up:  diarrhea    Interval History/ROS:  Overnight: No acute events.  Patient remains afebrile.  Otherwise hemodynamically stable on room air.  Latest labs show no leukocytosis, BMP with renal function within normal limits.  TTE on 8/9/2024 no evidence for valvular vegetation.    Seen examined at bedside.  No new complaints.  Allergies  cinnamon (Unknown)  Cucumbers,  Squash (all varieties) (Pruritus; Urticaria)  Tree Nuts (Short breath)  eggs (Rash)  shea butter (Unknown)  aspirin (Other)  [place] (Unknown)  Cashews (Unknown)  latex (Pruritus; Rash)        ANTIMICROBIALS:  cefTRIAXone   IVPB 1000 every 24 hours  metroNIDAZOLE  IVPB 500 every 8 hours  metroNIDAZOLE  IVPB        OTHER MEDS:  MEDICATIONS  (STANDING):  acetaminophen     Tablet .. 650 every 6 hours PRN  albuterol    90 MICROgram(s) HFA Inhaler 2 every 6 hours PRN  amLODIPine   Tablet 10 daily  carvedilol 3.125 every 12 hours  enoxaparin Injectable 40 every 24 hours  escitalopram 10 daily  famotidine    Tablet 20 daily  montelukast 10 daily  ondansetron Injectable 4 every 8 hours PRN  pantoprazole    Tablet 40 before breakfast      Vital Signs Last 24 Hrs  T(C): 36.5 (09 Sep 2024 13:49), Max: 37.1 (09 Sep 2024 04:00)  T(F): 97.7 (09 Sep 2024 13:49), Max: 98.7 (09 Sep 2024 04:00)  HR: 79 (09 Sep 2024 13:49) (61 - 79)  BP: 135/75 (09 Sep 2024 13:49) (123/80 - 135/75)  BP(mean): --  RR: 18 (09 Sep 2024 13:49) (18 - 18)  SpO2: 96% (09 Sep 2024 13:49) (95% - 96%)    Parameters below as of 09 Sep 2024 13:49  Patient On (Oxygen Delivery Method): room air        PHYSICAL EXAM:  Constitutional: non-toxic, no distress  HEAD/EYES: anicteric, no conjunctival injection  ENT:  supple, no thrush  Cardiovascular:   normal S1, S2, no murmur, no edema  Respiratory:  clear BS bilaterally, no wheezes, no rales  GI:  soft, non-tender, normal bowel sounds  :  no kennedy, no CVA tenderness  Musculoskeletal:  no synovitis, normal ROM  Neurologic: awake and alert, normal strength, no focal findings  Skin:  no rash, no erythema, no phlebitis  Heme/Onc: no lymphadenopathy   Psychiatric:  awake, alert, appropriate mood                            12.4   8.34  )-----------( 307      ( 09 Sep 2024 07:04 )             37.7       09-09    138  |  103  |  5<L>  ----------------------------<  107<H>  3.4<L>   |  21<L>  |  0.71    Ca    8.9      09 Sep 2024 07:05  Phos  2.3     09-09  Mg     2.3     09-09        Urinalysis Basic - ( 09 Sep 2024 07:05 )    Color: x / Appearance: x / SG: x / pH: x  Gluc: 107 mg/dL / Ketone: x  / Bili: x / Urobili: x   Blood: x / Protein: x / Nitrite: x   Leuk Esterase: x / RBC: x / WBC x   Sq Epi: x / Non Sq Epi: x / Bacteria: x        MICROBIOLOGY:  v    Culture - Stool (collected 07 Sep 2024 07:04)  Source: .Stool alejandra aniket  Preliminary Report (08 Sep 2024 20:04):    No enteric pathogens to date: Final culture pending    Culture - Blood (collected 06 Sep 2024 16:00)  Source: .Blood Blood-Peripheral  Preliminary Report (08 Sep 2024 20:00):    No growth at 48 Hours    Culture - Urine (collected 06 Sep 2024 15:42)  Source: Clean Catch Clean Catch (Midstream)  Preliminary Report (08 Sep 2024 23:49):    10,000 - 49,000 CFU/mL Enterococcus faecalis    <10,000 CFU/ml Normal Urogenital luis present    Culture - Blood (collected 06 Sep 2024 15:00)  Source: .Blood Blood-Peripheral  Gram Stain (08 Sep 2024 06:23):    Growth in aerobic bottle: Gram Positive Cocci in Clusters    Growth in anaerobic bottle: Gram Positive Cocci in Clusters  Final Report (09 Sep 2024 11:39):    Growth in aerobic bottle: Staphylococcus warneri    Growth in anaerobic bottle: Staphylococcus capitis    Isolation of Coagulase negative Staphylococcus from single blood culture    sets may represent    contamination. Contact the Microbiology Department at [phone] if    susceptibility testing is    clinically indicated.    Direct identification is available within approximately 3-5    hours either by Blood Panel Multiplexed PCR or Direct    MALDI-TOF. Details: [email or web address]  Organism: Blood Culture PCR (09 Sep 2024 11:39)  Organism: Blood Culture PCR (09 Sep 2024 11:39)      Method Type: PCR      -  Coagulase negative Staphylococcus: Detec                Clostridium difficile GDH Toxins A&amp;B, EIA:   Negative (09-08-24 @ 13:15)  Clostridium difficile GDH Interpretation: Negative for toxigenic C. Difficile.  This specimen is negative for C.  Difficile glutamate dehydrogenase (GDH) antigen and negative for C.  Difficile Toxins A & B, by EIA.  GDH is a highly sensitive screening  marker for C. Difficile that is produced in large amounts by all C.  Difficile strains, both toxigenic and nontoxigenic.  This assay has not  been validated as a test of cure.  Repeat testing during the same episode  of diarrhea is of limited value and is discouraged.  The results of this  assay should always be interpreted in conjunction with patient's clinical  history. (09-08-24 @ 13:15)      RADIOLOGY:  Imaging reviewed
    INTERVAL HPI/OVERNIGHT EVENTS:  feeling better  wants to go home  stools still loose but not watery and smaller volume  intermittent gas/bloating - improved with Bentyl  dyspepsia and nausea improved with  PPI + Pepcid    MEDICATIONS  (STANDING):  amLODIPine   Tablet 10 milliGRAM(s) Oral daily  carvedilol 3.125 milliGRAM(s) Oral every 12 hours  cefTRIAXone   IVPB 1000 milliGRAM(s) IV Intermittent every 24 hours  dicyclomine 10 milliGRAM(s) Oral three times a day before meals  enoxaparin Injectable 40 milliGRAM(s) SubCutaneous every 24 hours  escitalopram 10 milliGRAM(s) Oral daily  famotidine    Tablet 20 milliGRAM(s) Oral daily  fluticasone propionate 50 MICROgram(s)/spray Nasal Spray 1 Spray(s) Both Nostrils two times a day  lactobacillus acidophilus 1 Tablet(s) Oral two times a day with meals  metroNIDAZOLE  IVPB      metroNIDAZOLE  IVPB 500 milliGRAM(s) IV Intermittent every 8 hours  montelukast 10 milliGRAM(s) Oral daily  pantoprazole    Tablet 40 milliGRAM(s) Oral before breakfast    MEDICATIONS  (PRN):  acetaminophen     Tablet .. 650 milliGRAM(s) Oral every 6 hours PRN Temp greater or equal to 38C (100.4F), Mild Pain (1 - 3)  albuterol    90 MICROgram(s) HFA Inhaler 2 Puff(s) Inhalation every 6 hours PRN Shortness of Breath and/or Wheezing  aluminum hydroxide/magnesium hydroxide/simethicone Suspension 30 milliLiter(s) Oral every 4 hours PRN Dyspepsia  aluminum hydroxide/magnesium hydroxide/simethicone Suspension 30 milliLiter(s) Oral every 6 hours PRN Dyspepsia  ondansetron Injectable 4 milliGRAM(s) IV Push every 8 hours PRN Nausea and/or Vomiting      Allergies  cinnamon (Unknown)  Cucumbers,  Squash (all varieties) (Pruritus; Urticaria)  Tree Nuts (Short breath)  eggs (Rash)  shea butter (Unknown)  aspirin (Other)  [place] (Unknown)  Cashews (Unknown)  latex (Pruritus; Rash)    Intolerances  lactose (Diarrhea)    Review of Systems:  see HPI- remainder 10 point ROS negative      Vital Signs Last 24 Hrs  T(C): 36.7 (11 Sep 2024 04:00), Max: 36.7 (11 Sep 2024 04:00)  T(F): 98.1 (11 Sep 2024 04:00), Max: 98.1 (11 Sep 2024 04:00)  HR: 60 (11 Sep 2024 04:00) (60 - 72)  BP: 132/76 (11 Sep 2024 04:00) (125/81 - 132/76)  BP(mean): --  RR: 18 (11 Sep 2024 04:00) (18 - 18)  SpO2: 97% (11 Sep 2024 04:00) (97% - 98%)    Parameters below as of 11 Sep 2024 04:00  Patient On (Oxygen Delivery Method): room air    PHYSICAL EXAM:    Constitutional: non toxic appearing  female  sitting up in bed. pleasant and cooperative, sl anxious. very cooperative and pleasant  Neck: no LAD  Respiratory: clear bl  Cardiovascular: S1S2 regular  Gastrointestinal: obese soft obese ND NT no rebound guarding or rigidity.    Extremities: no edema  Vascular: bl pulses, warm  Neurological: AOx3 no focal asymmetry  Psychiatric: sl anxious. calm and cooperative  Skin: anicteric, improved turgor    LABS:                        11.8   9.35  )-----------( 373      ( 11 Sep 2024 08:24 )             36.5     09-11    141  |  106  |  <4<L>  ----------------------------<  100<H>  3.4<L>   |  24  |  0.62    Ca    9.2      11 Sep 2024 08:24  Phos  2.5     09-11  Mg     2.1     09-11    Culture - Blood (09.09.24 @ 05:50)   Specimen Source: .Blood Blood-Peripheral  Culture Results:   No growth at 48 Hours      Culture - Stool (09.07.24 @ 07:04)   Specimen Source: .Stool alejandra aniket  Culture Results:   No enteric pathogens isolated.   (Stool culture examined for Salmonella,   Shigella, Campylobacter, Aeromonas, Plesiomonas,   Vibrio, E.coli O157 and Yersinia)   No enteric gram negative rods isolated    Calprotectin, Stool (09.07.24 @ 16:44)   Calprotectin, Stool: 3340: **Results verified by repeat testing**       RADIOLOGY & ADDITIONAL TESTS:  
  INTERVAL HPI/OVERNIGHT EVENTS:  appetite fair - took very littel PO solids pe report  +dyspepsia  +colicky crampiing type gas discomfort and spasms followed by urgency    stools still loose - greenish color  reports 5 episodes of stooling overnight and multiple episodes yesterday evening - smaller volume compared to initial presentation and at home    last BM 1 hour prior  wants to try some bread/cookies, sandwich today    no fever or chills  no CP or SOB  +anxious  no rectal bleeding    MEDICATIONS  (STANDING):  amLODIPine   Tablet 10 milliGRAM(s) Oral daily  carvedilol 3.125 milliGRAM(s) Oral every 12 hours  cefTRIAXone   IVPB 1000 milliGRAM(s) IV Intermittent every 24 hours  enoxaparin Injectable 40 milliGRAM(s) SubCutaneous every 24 hours  escitalopram 10 milliGRAM(s) Oral daily  famotidine    Tablet 20 milliGRAM(s) Oral daily  fluticasone propionate 50 MICROgram(s)/spray Nasal Spray 1 Spray(s) Both Nostrils two times a day  lactobacillus acidophilus 1 Tablet(s) Oral two times a day with meals  metroNIDAZOLE  IVPB 500 milliGRAM(s) IV Intermittent every 8 hours  metroNIDAZOLE  IVPB      montelukast 10 milliGRAM(s) Oral daily  pantoprazole    Tablet 40 milliGRAM(s) Oral before breakfast  sodium chloride 0.45% with potassium chloride 20 mEq/L 1000 milliLiter(s) (50 mL/Hr) IV Continuous <Continuous>  sodium phosphate 15 milliMole(s)/250 mL IVPB 15 milliMole(s) IV Intermittent once    MEDICATIONS  (PRN):  acetaminophen     Tablet .. 650 milliGRAM(s) Oral every 6 hours PRN Temp greater or equal to 38C (100.4F), Mild Pain (1 - 3)  albuterol    90 MICROgram(s) HFA Inhaler 2 Puff(s) Inhalation every 6 hours PRN Shortness of Breath and/or Wheezing  aluminum hydroxide/magnesium hydroxide/simethicone Suspension 30 milliLiter(s) Oral every 6 hours PRN Dyspepsia  ondansetron Injectable 4 milliGRAM(s) IV Push every 8 hours PRN Nausea and/or Vomiting      Allergies    cinnamon (Unknown)  Cucumbers,  Squash (all varieties) (Pruritus; Urticaria)  Tree Nuts (Short breath)  eggs (Rash)  shea butter (Unknown)  aspirin (Other)  [place] (Unknown)  Cashews (Unknown)  latex (Pruritus; Rash)    Intolerances  lactose (Diarrhea)      Review of Systems:  see HPI- remainder 10 point ROS negative    Vital Signs Last 24 Hrs  T(C): 36.3 (10 Sep 2024 04:55), Max: 36.8 (09 Sep 2024 20:29)  T(F): 97.3 (10 Sep 2024 04:55), Max: 98.2 (09 Sep 2024 20:29)  HR: 62 (10 Sep 2024 04:55) (62 - 79)  BP: 145/96 (10 Sep 2024 04:55) (128/84 - 145/96)  BP(mean): --  RR: 18 (10 Sep 2024 04:55) (18 - 18)  SpO2: 97% (10 Sep 2024 04:55) (96% - 97%)    Parameters below as of 10 Sep 2024 04:55  Patient On (Oxygen Delivery Method): room air    PHYSICAL EXAM:  Constitutional: non toxic appearing  female  sitting up in bed; appears more comfortable than yesterdays exam. Commode at bedside  Neck: no LAD  Respiratory: clear bl  Cardiovascular: S1S2 regular  Gastrointestinal: obese soft sl distended (less than previous) NT no rebound guarding or rigidity.    Extremities: no edema  Vascular: bl pulses, warm  Neurological: AOx3 no focal asymmetry  Psychiatric: sl anxious. calm and cooperative  Skin: anicteric, improved turgor    LABS:                        11.8   7.65  )-----------( 332      ( 10 Sep 2024 09:57 )             35.9     09-10    140  |  107  |  4<L>  ----------------------------<  118<H>  3.7   |  22  |  0.67    Ca    9.0      10 Sep 2024 09:58  Phos  2.1     09-10  Mg     2.1     09-10    Culture - Blood (09.09.24 @ 05:50)   Specimen Source: .Blood Blood-Peripheral  Culture Results:   No growth at 24 hours    Culture - Stool (09.07.24 @ 07:04)   Specimen Source: .Stool alejandra aniket  Culture Results:   No enteric pathogens to date: Final culture pending        TRANSTHORACIC ECHOCARDIOGRAM REPORT  ________________________________________________________________________________                                      _______       Pt. Name:       ROSALIO ELIZONDO Study Date:    9/9/2024  MRN:            [id number]     YOB: 1965  Accession #:    [id number]      Age:           58 years  Account#:       [id number]   Gender:        F  Heart Rate:                    Height:        64.00 in (162.56 cm)  Rhythm:                        Weight: 190.00 lb (86.18 kg)  Blood Pressure: 123/80 mmHg    BSA/BMI:       1.91 m² / 32.61 kg/m²  ________________________________________________________________________________________  Referring Physician:    [id number] Jorge Hunter  Interpreting Physician: Angeles Vargas MD  Primary Sonographer:    Linda Mancilla [facility]  Fellow (Interpreting):  Mark Reddy    CPT:                ECHO TTE WITH CON COMP W DOPP - .m;DEFINITY ECHO                      CONTRAST PER ML - .m;DEFINITY ECHO CONTRAST PER ML                      WASTED - .m  Indication(s):      Endocarditis, valve unspecified - I38  Procedure:          Transthoracic echocardiogram with 2-D, M-mode and complete                      spectral and color flow Doppler.  Ordering Location:  [facility]  Admission Status:   Inpatient  Contrast Injection: Verbal consent was obtained for injection of Ultrasonic                      Enhancing Agent following a discussion of risks and                      benefits.                      Endocardial visualization enhanced with 2 ml of Definity                      Ultrasound enhancing agent (Lot#:[id number] Exp.Date:May 2025                      Discarded Dose:8ml).  UEA Reaction:       Patient had no adverse reaction after injection of                 Ultrasound Enhancing Agent.  Study Information:  Image quality for this study is fair.    _______________________________________________________________________________________     CONCLUSIONS:      1. Left ventricular cavity is normal in size. Left ventricular wall thickness is normal. Left ventricular systolic function is normal. There are no regional wall motion abnormalities seen.   2. Normal left ventricular diastolic function, with normal left ventricular filling pressure.  3. Normal right ventricular cavity size, with normal wall thickness, and probably normal right ventricular systolic function.   4. No significant valvular disease.   5. No pericardial effusion seen.   6. No echocardiographic evidence of vegetations seen on the Aortic, mitral and tricuspid valves. The pulmonic valves was not well seen. Consider LUI for further evaluation id clinical suspician is high.   7. No prior echocardiogram is available for comparison.      RADIOLOGY & ADDITIONAL TESTS:  
      Alex Britt, PGY1    ROSALIO ELIZONDO  58y  Female    Complaints:  Subjective:     No acute o/n events. Pt denies subj fevers/chills, HA, dizziness, chest pain, palpitations, n/v, abd pain, dysuria, diarrhea      FAMILY HISTORY:  Family history of diabetes mellitus (Mother)    Family history of hypertension    Family history of hyperlipidemia    Family history of stomach cancer (Uncle)      58yVital Signs Last 24 Hrs  T(C): 36.4 (07 Sep 2024 20:08), Max: 36.8 (07 Sep 2024 14:46)  T(F): 97.5 (07 Sep 2024 20:08), Max: 98.2 (07 Sep 2024 14:46)  HR: 75 (08 Sep 2024 05:33) (73 - 89)  BP: 115/76 (08 Sep 2024 05:33) (114/77 - 126/76)  BP(mean): --  RR: 18 (07 Sep 2024 20:08) (18 - 18)  SpO2: 95% (07 Sep 2024 20:08) (92% - 95%)    Parameters below as of 07 Sep 2024 20:08  Patient On (Oxygen Delivery Method): room air          PHYSICAL EXAM:  GENERAL: NAD, well-groomed, well-developed  HEAD:  Atraumatic, Normocephalic  EYES: EOMI, PERRLA, conjunctiva and sclera clear  ENMT: No tonsillar erythema, exudates, or enlargement; Moist mucous membranes, Good dentition, No lesions  NECK: Supple, No JVD, Normal thyroid  NERVOUS SYSTEM:  Alert & Oriented X3, Good concentration; Motor Strength 5/5 B/L upper and lower extremities; DTRs 2+ intact and symmetric  CHEST/LUNG: Clear to percussion bilaterally; No rales, rhonchi, wheezing, or rubs  HEART: Regular rate and rhythm; No murmurs, rubs, or gallops  ABDOMEN: Soft, Nontender, Nondistended; Bowel sounds present  EXTREMITIES:  2+ Peripheral Pulses, No clubbing, cyanosis, or edema  LYMPH: No lymphadenopathy noted  SKIN: No rashes or lesions      Consultant(s) Notes Reviewed:  [x ] YES  [ ] NO  Care Discussed with Consultants/Other Providers [ x] YES  [ ] NO    LABS:                Female  RADIOLOGY & ADDITIONAL TESTS:    Imaging Personally Reviewed:  [ ] YES  [ ] NO    MedsMEDICATIONS  (STANDING):  amLODIPine   Tablet 10 milliGRAM(s) Oral daily  carvedilol 3.125 milliGRAM(s) Oral every 12 hours  escitalopram 10 milliGRAM(s) Oral daily  famotidine    Tablet 20 milliGRAM(s) Oral daily  fluticasone propionate 50 MICROgram(s)/spray Nasal Spray 1 Spray(s) Both Nostrils two times a day  lactated ringers. 1000 milliLiter(s) (75 mL/Hr) IV Continuous <Continuous>  montelukast 10 milliGRAM(s) Oral daily  pantoprazole    Tablet 40 milliGRAM(s) Oral before breakfast  piperacillin/tazobactam IVPB.. 3.375 Gram(s) IV Intermittent every 6 hours  sodium chloride 0.45% with potassium chloride 20 mEq/L 1000 milliLiter(s) (100 mL/Hr) IV Continuous <Continuous>  vancomycin  IVPB 1250 milliGRAM(s) IV Intermittent every 12 hours  vancomycin  IVPB        MEDICATIONS  (PRN):  acetaminophen     Tablet .. 650 milliGRAM(s) Oral every 6 hours PRN Temp greater or equal to 38C (100.4F), Mild Pain (1 - 3)  albuterol    90 MICROgram(s) HFA Inhaler 2 Puff(s) Inhalation every 6 hours PRN Shortness of Breath and/or Wheezing  ondansetron Injectable 4 milliGRAM(s) IV Push every 8 hours PRN Nausea and/or Vomiting      HEALTH ISSUES - PROBLEM Dx:  Gastroenteritis    Asthma    Hypertension    Anxiety and depression    Prophylactic measure    MONICA (acute kidney injury)    Hyponatremia            
PROGRESS NOTE    ROSALIO CARO ([id number])- Patient is a 58y old  Female who presents with a chief complaint of gastroenteritis (11 Sep 2024 12:49)      INTERVAL HPI/OVERNIGHT EVENTS:   Patient has no acute events overnight, Had diarrhea at 12am and then at 6am    SUBJECTIVE: Pt seen at bedside; denies n/v, sob, chest pain. Diarrhea much improved - less frequent    MEDICATIONS:  acetaminophen     Tablet .. 650 milliGRAM(s) Oral every 6 hours PRN  albuterol    90 MICROgram(s) HFA Inhaler 2 Puff(s) Inhalation every 6 hours PRN  aluminum hydroxide/magnesium hydroxide/simethicone Suspension 30 milliLiter(s) Oral every 6 hours PRN  aluminum hydroxide/magnesium hydroxide/simethicone Suspension 30 milliLiter(s) Oral every 4 hours PRN  amLODIPine   Tablet 10 milliGRAM(s) Oral daily  carvedilol 3.125 milliGRAM(s) Oral every 12 hours  dicyclomine 10 milliGRAM(s) Oral three times a day before meals  enoxaparin Injectable 40 milliGRAM(s) SubCutaneous every 24 hours  escitalopram 10 milliGRAM(s) Oral daily  famotidine    Tablet 20 milliGRAM(s) Oral daily  fluticasone propionate 50 MICROgram(s)/spray Nasal Spray 1 Spray(s) Both Nostrils two times a day  lactobacillus acidophilus 1 Tablet(s) Oral two times a day with meals  montelukast 10 milliGRAM(s) Oral daily  ondansetron Injectable 4 milliGRAM(s) IV Push every 8 hours PRN  pantoprazole    Tablet 40 milliGRAM(s) Oral before breakfast      PHYSICAL EXAM:  Vital Signs Last 24 Hrs  T(C): 36.7 (11 Sep 2024 04:00), Max: 36.7 (11 Sep 2024 04:00)  T(F): 98.1 (11 Sep 2024 04:00), Max: 98.1 (11 Sep 2024 04:00)  HR: 60 (11 Sep 2024 04:00) (60 - 72)  BP: 132/76 (11 Sep 2024 04:00) (125/81 - 132/76)  BP(mean): --  RR: 18 (11 Sep 2024 04:00) (18 - 18)  SpO2: 97% (11 Sep 2024 04:00) (97% - 98%)    Parameters below as of 11 Sep 2024 04:00  Patient On (Oxygen Delivery Method): room air      GENERAL: NAD, well-groomed, well-developed  HEAD:  Atraumatic, Normocephalic  EYES: EOMI, PERRLA, conjunctiva and sclera clear  ENMT: No tonsillar erythema, exudates, or enlargement; Moist mucous membranes, Good dentition, No lesions  NECK: Supple, No JVD, Normal thyroid  NERVOUS SYSTEM:  Alert & Oriented X3, Good concentration; Motor Strength 5/5 B/L upper and lower extremities; DTRs 2+ intact and symmetric  CHEST/LUNG: Clear to percussion bilaterally; No rales, rhonchi, wheezing, or rubs  HEART: Regular rate and rhythm; No murmurs, rubs, or gallops  ABDOMEN: Soft, Nontender, Nondistended; Bowel sounds present  EXTREMITIES:  2+ Peripheral Pulses, No clubbing, cyanosis, or edema  LYMPH: No lymphadenopathy noted  SKIN: No rashes or lesions        LABS:    Culture - Blood (collected 09-09-24 @ 09:10)  Source: .Blood Blood-Peripheral  Preliminary Report (09-10-24 @ 14:02):    No growth at 24 hours    Culture - Blood (collected 09-09-24 @ 05:50)  Source: .Blood Blood-Peripheral  Preliminary Report (09-11-24 @ 11:01):    No growth at 48 Hours    Culture - Stool (collected 09-07-24 @ 07:04)  Source: .Stool alejandra aniket  Final Report (09-10-24 @ 23:09):    No enteric pathogens isolated.    (Stool culture examined for Salmonella,    Shigella, Campylobacter, Aeromonas, Plesiomonas,    Vibrio, E.coli O157 and Yersinia)    No enteric gram negative rods isolated    Culture - Blood (collected 09-06-24 @ 16:00)  Source: .Blood Blood-Peripheral  Preliminary Report (09-10-24 @ 20:00):    No growth at 4 days    Culture - Urine (collected 09-06-24 @ 15:42)  Source: Clean Catch Clean Catch (Midstream)  Final Report (09-09-24 @ 19:56):    10,000 - 49,000 CFU/mL Enterococcus faecalis    <10,000 CFU/ml Normal Urogenital luis present  Organism: Enterococcus faecalis (09-09-24 @ 19:56)  Organism: Enterococcus faecalis (09-09-24 @ 19:56)      -  Levofloxacin: S 1      -  Nitrofurantoin: S <=32 Should not be used to treat pyelonephritis.      -  Vancomycin: S 2      -  Ciprofloxacin: S <=1      -  Ampicillin: S <=2 Predicts results to ampicillin/sulbactam, amoxacillin-clavulanate and  piperacillin-tazobactam.      -  Tetracycline: R >8      Method Type: KATLYN                            11.8   9.35  )-----------( 373      ( 11 Sep 2024 08:24 )             36.5     09-11    141  |  106  |  <4<L>  ----------------------------<  100<H>  3.4<L>   |  24  |  0.62    Ca    9.2      11 Sep 2024 08:24  Phos  2.5     09-11  Mg     2.1     09-11            Urinalysis Basic - ( 11 Sep 2024 08:24 )    Color: x / Appearance: x / SG: x / pH: x  Gluc: 100 mg/dL / Ketone: x  / Bili: x / Urobili: x   Blood: x / Protein: x / Nitrite: x   Leuk Esterase: x / RBC: x / WBC x   Sq Epi: x / Non Sq Epi: x / Bacteria: x        Lactate Trend        CAPILLARY BLOOD GLUCOSE              RADIOLOGY & ADDITIONAL TESTS were viewed by me personally.   EKG: 
PROGRESS NOTE    ROSALIO CARO ([id number])- Patient is a 58y old  Female who presents with a chief complaint of gastroenteritis (10 Sep 2024 12:15)      INTERVAL HPI/OVERNIGHT EVENTS:   Patient has no acute events overnight,     SUBJECTIVE: Pt seen at bedside; denies n/v, sob, chest pain. Diarrhea occurring every 2-3 hours - improved from yesterday    MEDICATIONS:  acetaminophen     Tablet .. 650 milliGRAM(s) Oral every 6 hours PRN  albuterol    90 MICROgram(s) HFA Inhaler 2 Puff(s) Inhalation every 6 hours PRN  aluminum hydroxide/magnesium hydroxide/simethicone Suspension 30 milliLiter(s) Oral every 6 hours PRN  amLODIPine   Tablet 10 milliGRAM(s) Oral daily  carvedilol 3.125 milliGRAM(s) Oral every 12 hours  cefTRIAXone   IVPB 1000 milliGRAM(s) IV Intermittent every 24 hours  dicyclomine 10 milliGRAM(s) Oral three times a day before meals  enoxaparin Injectable 40 milliGRAM(s) SubCutaneous every 24 hours  escitalopram 10 milliGRAM(s) Oral daily  famotidine    Tablet 20 milliGRAM(s) Oral daily  fluticasone propionate 50 MICROgram(s)/spray Nasal Spray 1 Spray(s) Both Nostrils two times a day  lactobacillus acidophilus 1 Tablet(s) Oral two times a day with meals  metroNIDAZOLE  IVPB 500 milliGRAM(s) IV Intermittent every 8 hours  metroNIDAZOLE  IVPB      montelukast 10 milliGRAM(s) Oral daily  ondansetron Injectable 4 milliGRAM(s) IV Push every 8 hours PRN  pantoprazole    Tablet 40 milliGRAM(s) Oral before breakfast  sodium chloride 0.45% with potassium chloride 20 mEq/L 1000 milliLiter(s) IV Continuous <Continuous>  sodium phosphate 15 milliMole(s)/250 mL IVPB 15 milliMole(s) IV Intermittent once      PHYSICAL EXAM:  Vital Signs Last 24 Hrs  T(C): 36.6 (10 Sep 2024 12:15), Max: 36.8 (09 Sep 2024 20:29)  T(F): 97.8 (10 Sep 2024 12:15), Max: 98.2 (09 Sep 2024 20:29)  HR: 66 (10 Sep 2024 12:15) (62 - 69)  BP: 138/90 (10 Sep 2024 12:15) (128/84 - 145/96)  BP(mean): --  RR: 18 (10 Sep 2024 12:15) (18 - 18)  SpO2: 96% (10 Sep 2024 12:15) (96% - 97%)    Parameters below as of 10 Sep 2024 12:15  Patient On (Oxygen Delivery Method): room air        GENERAL: NAD, well-groomed, well-developed  HEAD:  Atraumatic, Normocephalic  EYES: EOMI, PERRLA, conjunctiva and sclera clear  ENMT: No tonsillar erythema, exudates, or enlargement; Moist mucous membranes, Good dentition, No lesions  NECK: Supple, No JVD, Normal thyroid  NERVOUS SYSTEM:  Alert & Oriented X3, Good concentration; Motor Strength 5/5 B/L upper and lower extremities; DTRs 2+ intact and symmetric  CHEST/LUNG: Clear to percussion bilaterally; No rales, rhonchi, wheezing, or rubs  HEART: Regular rate and rhythm; No murmurs, rubs, or gallops  ABDOMEN: Soft, Nontender, Nondistended; Bowel sounds present  EXTREMITIES:  2+ Peripheral Pulses, No clubbing, cyanosis, or edema  LYMPH: No lymphadenopathy noted  SKIN: No rashes or lesions          LABS:    Culture - Blood (collected 09-09-24 @ 09:10)  Source: .Blood Blood-Peripheral  Preliminary Report (09-10-24 @ 14:02):    No growth at 24 hours    Culture - Blood (collected 09-09-24 @ 05:50)  Source: .Blood Blood-Peripheral  Preliminary Report (09-10-24 @ 11:01):    No growth at 24 hours    Culture - Stool (collected 09-07-24 @ 07:04)  Source: .Stool alejandra aniket  Preliminary Report (09-08-24 @ 20:04):    No enteric pathogens to date: Final culture pending    Culture - Blood (collected 09-06-24 @ 16:00)  Source: .Blood Blood-Peripheral  Preliminary Report (09-09-24 @ 20:01):    No growth at 72 Hours    Culture - Urine (collected 09-06-24 @ 15:42)  Source: Clean Catch Clean Catch (Midstream)  Final Report (09-09-24 @ 19:56):    10,000 - 49,000 CFU/mL Enterococcus faecalis    <10,000 CFU/ml Normal Urogenital luis present  Organism: Enterococcus faecalis (09-09-24 @ 19:56)  Organism: Enterococcus faecalis (09-09-24 @ 19:56)      Method Type: KATLYN      -  Ampicillin: S <=2 Predicts results to ampicillin/sulbactam, amoxacillin-clavulanate and  piperacillin-tazobactam.      -  Ciprofloxacin: S <=1      -  Levofloxacin: S 1      -  Nitrofurantoin: S <=32 Should not be used to treat pyelonephritis.      -  Tetracycline: R >8      -  Vancomycin: S 2                            11.8   7.65  )-----------( 332      ( 10 Sep 2024 09:57 )             35.9     09-10    140  |  107  |  4<L>  ----------------------------<  118<H>  3.7   |  22  |  0.67    Ca    9.0      10 Sep 2024 09:58  Phos  2.1     09-10  Mg     2.1     09-10            Urinalysis Basic - ( 10 Sep 2024 09:58 )    Color: x / Appearance: x / SG: x / pH: x  Gluc: 118 mg/dL / Ketone: x  / Bili: x / Urobili: x   Blood: x / Protein: x / Nitrite: x   Leuk Esterase: x / RBC: x / WBC x   Sq Epi: x / Non Sq Epi: x / Bacteria: x        RADIOLOGY & ADDITIONAL TESTS were viewed by me personally. 
PROGRESS NOTE    ROSALIO CARO ([id number])- Patient is a 58y old  Female who presents with a chief complaint of gastroenteritis (07 Sep 2024 06:54)      INTERVAL HPI/OVERNIGHT EVENTS:   Patient has no acute events overnight,     SUBJECTIVE: Pt seen at bedside; denies n/v, sob, chest pain. Continuing to have diarrhea every 1-2 hours    MEDICATIONS:  acetaminophen     Tablet .. 650 milliGRAM(s) Oral every 6 hours PRN  albuterol    90 MICROgram(s) HFA Inhaler 2 Puff(s) Inhalation every 6 hours PRN  amLODIPine   Tablet 10 milliGRAM(s) Oral daily  carvedilol 6.25 milliGRAM(s) Oral daily  escitalopram 10 milliGRAM(s) Oral daily  famotidine    Tablet 20 milliGRAM(s) Oral daily  fluticasone propionate 50 MICROgram(s)/spray Nasal Spray 1 Spray(s) Both Nostrils two times a day  lactated ringers. 1000 milliLiter(s) IV Continuous <Continuous>  montelukast 10 milliGRAM(s) Oral daily  ondansetron Injectable 4 milliGRAM(s) IV Push every 8 hours PRN  pantoprazole    Tablet 40 milliGRAM(s) Oral before breakfast  piperacillin/tazobactam IVPB.- 3.375 Gram(s) IV Intermittent once  piperacillin/tazobactam IVPB.- 3.375 Gram(s) IV Intermittent once  sodium chloride 0.45% with potassium chloride 20 mEq/L 1000 milliLiter(s) IV Continuous <Continuous>      PHYSICAL EXAM:  Vital Signs Last 24 Hrs  T(C): 36.5 (07 Sep 2024 09:25), Max: 37.8 (06 Sep 2024 22:27)  T(F): 97.7 (07 Sep 2024 09:25), Max: 100.1 (06 Sep 2024 22:27)  HR: 89 (07 Sep 2024 09:25) (71 - 99)  BP: 121/74 (07 Sep 2024 09:25) (110/73 - 146/71)  BP(mean): --  RR: 18 (07 Sep 2024 09:25) (16 - 18)  SpO2: 92% (07 Sep 2024 09:25) (92% - 97%)    Parameters below as of 07 Sep 2024 09:25  Patient On (Oxygen Delivery Method): room air        GENERAL: fatigued, lying in bed comfortably  HEAD:  Atraumatic, Normocephalic  EYES: EOMI, PERRLA. No scleral incterus and no conjunctival injection. Tracks me in room  ENT: Moist mucous membranes  NECK: Supple, No JVD  CHEST/LUNG: Clear to auscultation bilaterally; No rales, rhonchi, wheezing, or rubs. Unlabored respirations  HEART: Regular rate and rhythm; No murmurs, rubs, or gallops  ABDOMEN: +mildly tender abdomen BS +; Soft, nondistended  EXTREMITIES:  2+ Peripheral Pulses, brisk capillary refill. No clubbing, cyanosis, or edema  NERVOUS SYSTEM:  A&Ox3, no focal neurological deficits. CN II-XII grossly intact, but not individually tested.  PSYCHIATRIC: Cooperative. Appropriate mood and affect.  SKIN: Peeling pruritic rash on sole of b/l feet, psoriatic lesions on b/l fingers      LABS:                          12.3   7.69  )-----------( 284      ( 07 Sep 2024 08:25 )             36.2     09-07    134<L>  |  94<L>  |  18  ----------------------------<  110<H>  2.5<LL>   |  23  |  1.00    Ca    9.2      07 Sep 2024 08:25  Phos  1.9     09-07  Mg     2.2     09-07    TPro  7.0  /  Alb  3.6  /  TBili  0.5  /  DBili  x   /  AST  62<H>  /  ALT  73<H>  /  AlkPhos  106  09-07          Urinalysis Basic - ( 07 Sep 2024 08:25 )    Color: x / Appearance: x / SG: x / pH: x  Gluc: 110 mg/dL / Ketone: x  / Bili: x / Urobili: x   Blood: x / Protein: x / Nitrite: x   Leuk Esterase: x / RBC: x / WBC x   Sq Epi: x / Non Sq Epi: x / Bacteria: x        Lactate Trend        CAPILLARY BLOOD GLUCOSE              RADIOLOGY & ADDITIONAL TESTS were viewed by me personally.   EKG: 
PROGRESS NOTE    ROSALIO CARO ([id number])- Patient is a 58y old  Female who presents with a chief complaint of gastroenteritis (09 Sep 2024 11:34)      INTERVAL HPI/OVERNIGHT EVENTS:   Patient has no acute events overnight,     SUBJECTIVE: Pt seen at bedside; denies n/v, sob, chest pain. Continuing to have watery diarrhea every hour    MEDICATIONS:  acetaminophen     Tablet .. 650 milliGRAM(s) Oral every 6 hours PRN  albuterol    90 MICROgram(s) HFA Inhaler 2 Puff(s) Inhalation every 6 hours PRN  amLODIPine   Tablet 10 milliGRAM(s) Oral daily  carvedilol 3.125 milliGRAM(s) Oral every 12 hours  cefTRIAXone   IVPB 1000 milliGRAM(s) IV Intermittent every 24 hours  enoxaparin Injectable 40 milliGRAM(s) SubCutaneous every 24 hours  escitalopram 10 milliGRAM(s) Oral daily  famotidine    Tablet 20 milliGRAM(s) Oral daily  fluticasone propionate 50 MICROgram(s)/spray Nasal Spray 1 Spray(s) Both Nostrils two times a day  lactobacillus acidophilus 1 Tablet(s) Oral two times a day with meals  metroNIDAZOLE  IVPB      metroNIDAZOLE  IVPB 500 milliGRAM(s) IV Intermittent every 8 hours  montelukast 10 milliGRAM(s) Oral daily  ondansetron Injectable 4 milliGRAM(s) IV Push every 8 hours PRN  pantoprazole    Tablet 40 milliGRAM(s) Oral before breakfast  potassium chloride    Tablet ER 20 milliEquivalent(s) Oral once  sodium chloride 0.45% with potassium chloride 20 mEq/L 1000 milliLiter(s) IV Continuous <Continuous>      PHYSICAL EXAM:  Vital Signs Last 24 Hrs  T(C): 36.5 (09 Sep 2024 13:49), Max: 37.1 (09 Sep 2024 04:00)  T(F): 97.7 (09 Sep 2024 13:49), Max: 98.7 (09 Sep 2024 04:00)  HR: 79 (09 Sep 2024 13:49) (61 - 79)  BP: 135/75 (09 Sep 2024 13:49) (123/80 - 135/75)  BP(mean): --  RR: 18 (09 Sep 2024 13:49) (18 - 18)  SpO2: 96% (09 Sep 2024 13:49) (95% - 96%)    Parameters below as of 09 Sep 2024 13:49  Patient On (Oxygen Delivery Method): room air        GENERAL: NAD, well-groomed, well-developed  HEAD:  Atraumatic, Normocephalic  EYES: EOMI, PERRLA, conjunctiva and sclera clear  ENMT: No tonsillar erythema, exudates, or enlargement; Moist mucous membranes, Good dentition, No lesions  NECK: Supple, No JVD, Normal thyroid  NERVOUS SYSTEM:  Alert & Oriented X3, Good concentration; Motor Strength 5/5 B/L upper and lower extremities; DTRs 2+ intact and symmetric  CHEST/LUNG: Clear to percussion bilaterally; No rales, rhonchi, wheezing, or rubs  HEART: Regular rate and rhythm; No murmurs, rubs, or gallops  ABDOMEN: Soft, Nontender, Nondistended; Bowel sounds present  EXTREMITIES:  2+ Peripheral Pulses, No clubbing, cyanosis, or edema  LYMPH: No lymphadenopathy noted  SKIN: No rashes or lesions        LABS:    Culture - Stool (collected 09-07-24 @ 07:04)  Source: .Stool alejandra aniket  Preliminary Report (09-08-24 @ 20:04):    No enteric pathogens to date: Final culture pending    Culture - Blood (collected 09-06-24 @ 16:00)  Source: .Blood Blood-Peripheral  Preliminary Report (09-08-24 @ 20:00):    No growth at 48 Hours    Culture - Urine (collected 09-06-24 @ 15:42)  Source: Clean Catch Clean Catch (Midstream)  Preliminary Report (09-08-24 @ 23:49):    10,000 - 49,000 CFU/mL Enterococcus faecalis    <10,000 CFU/ml Normal Urogenital luis present    Culture - Blood (collected 09-06-24 @ 15:00)  Source: .Blood Blood-Peripheral  Gram Stain (09-08-24 @ 06:23):    Growth in aerobic bottle: Gram Positive Cocci in Clusters    Growth in anaerobic bottle: Gram Positive Cocci in Clusters  Final Report (09-09-24 @ 11:39):    Growth in aerobic bottle: Staphylococcus warneri    Growth in anaerobic bottle: Staphylococcus capitis    Isolation of Coagulase negative Staphylococcus from single blood culture    sets may represent    contamination. Contact the Microbiology Department at [phone] if    susceptibility testing is    clinically indicated.    Direct identification is available within approximately 3-5    hours either by Blood Panel Multiplexed PCR or Direct    MALDI-TOF. Details: [email or web address]  Organism: Blood Culture PCR (09-09-24 @ 11:39)  Organism: Blood Culture PCR (09-09-24 @ 11:39)      Method Type: PCR      -  Coagulase negative Staphylococcus: Detec                            12.4   8.34  )-----------( 307      ( 09 Sep 2024 07:04 )             37.7     09-09    138  |  103  |  5<L>  ----------------------------<  107<H>  3.4<L>   |  21<L>  |  0.71    Ca    8.9      09 Sep 2024 07:05  Phos  2.3     09-09  Mg     2.3     09-09            Urinalysis Basic - ( 09 Sep 2024 07:05 )    Color: x / Appearance: x / SG: x / pH: x  Gluc: 107 mg/dL / Ketone: x  / Bili: x / Urobili: x   Blood: x / Protein: x / Nitrite: x   Leuk Esterase: x / RBC: x / WBC x   Sq Epi: x / Non Sq Epi: x / Bacteria: x        RADIOLOGY & ADDITIONAL TESTS were viewed by me personally.

## 2024-09-11 NOTE — PROGRESS NOTE ADULT - NUTRITIONAL ASSESSMENT
This patient has been assessed with a concern for Malnutrition and has been determined to have a diagnosis/diagnoses of Severe protein-calorie malnutrition.    This patient is being managed with:   Diet DASH/TLC-  Sodium & Cholesterol Restricted  Fiber/Residue Restricted (LOWFIBER)  Lactose Restricted (Milk Sugar Intoler.)  Nut Restricted  Egg Restricted  No Dairy  No Egg  No Lactose  No Nuts  Entered: Sep  7 2024  4:24AM  

## 2024-09-14 LAB
CULTURE RESULTS: SIGNIFICANT CHANGE UP
CULTURE RESULTS: SIGNIFICANT CHANGE UP
S PARATY A H AB SER QL: NEGATIVE — SIGNIFICANT CHANGE UP
S PARATY B H AB SER-ACNC: NEGATIVE — SIGNIFICANT CHANGE UP
S TYPHI H D AB SER-ACNC: NEGATIVE — SIGNIFICANT CHANGE UP
S TYPHI O D AB SER-ACNC: NEGATIVE — SIGNIFICANT CHANGE UP
S TYPHI O VI AB SER QL: NEGATIVE — SIGNIFICANT CHANGE UP
SPECIMEN SOURCE: SIGNIFICANT CHANGE UP
SPECIMEN SOURCE: SIGNIFICANT CHANGE UP

## 2024-10-08 ENCOUNTER — APPOINTMENT (OUTPATIENT)
Dept: GASTROENTEROLOGY | Facility: CLINIC | Age: 59
End: 2024-10-08
Payer: COMMERCIAL

## 2024-10-08 DIAGNOSIS — K63.5 POLYP OF COLON: ICD-10-CM

## 2024-10-08 PROCEDURE — 99443: CPT

## 2024-10-08 RX ORDER — SODIUM SULFATE, POTASSIUM SULFATE AND MAGNESIUM SULFATE 1.6; 3.13; 17.5 G/177ML; G/177ML; G/177ML
17.5-3.13-1.6 SOLUTION ORAL
Qty: 1 | Refills: 0 | Status: ACTIVE | COMMUNITY
Start: 2024-10-08 | End: 1900-01-01

## 2024-11-05 NOTE — ED ADULT NURSE NOTE - NSSEPSISSUSPECTED_ED_A_ED
Rose Zaldivar MD P Jeganmohan, Janandana Nurse  Patient needs wellness. Please schedule follow up for this. Thank you        ----- Message -----  From: Claudia Reyes APRN  Sent: 11/4/2024   7:10 AM CST  To: Rose Zaldivar MD   No

## 2025-01-06 NOTE — ED PROVIDER NOTE - IV ALTEPLASE EXCL ABS HIDDEN
Pt called regarding this matter. ED gave her prednisone while she was there. Pt stated she had a full blown panic attack while there. Said the prednisone kept her awake until 3am, had a lot of anxiety and heart palpitations. Not sure what to do, hoping to speak with pcp. Please advise.       show

## 2025-02-25 ENCOUNTER — APPOINTMENT (OUTPATIENT)
Dept: GASTROENTEROLOGY | Facility: HOSPITAL | Age: 60
End: 2025-02-25

## 2025-05-14 ENCOUNTER — APPOINTMENT (OUTPATIENT)
Dept: GASTROENTEROLOGY | Facility: CLINIC | Age: 60
End: 2025-05-14
Payer: COMMERCIAL

## 2025-05-14 DIAGNOSIS — K63.5 POLYP OF COLON: ICD-10-CM

## 2025-05-14 PROCEDURE — 99202 OFFICE O/P NEW SF 15 MIN: CPT | Mod: 93

## 2025-05-14 RX ORDER — SODIUM SULFATE, POTASSIUM SULFATE AND MAGNESIUM SULFATE 1.6; 3.13; 17.5 G/177ML; G/177ML; G/177ML
17.5-3.13-1.6 SOLUTION ORAL
Qty: 1 | Refills: 0 | Status: ACTIVE | COMMUNITY
Start: 2025-05-14 | End: 1900-01-01

## 2025-05-16 DIAGNOSIS — K63.5 POLYP OF COLON: ICD-10-CM

## 2025-08-07 ENCOUNTER — RESULT REVIEW (OUTPATIENT)
Age: 60
End: 2025-08-07

## 2025-08-07 ENCOUNTER — APPOINTMENT (OUTPATIENT)
Dept: GASTROENTEROLOGY | Facility: HOSPITAL | Age: 60
End: 2025-08-07

## 2025-08-07 ENCOUNTER — OUTPATIENT (OUTPATIENT)
Dept: OUTPATIENT SERVICES | Facility: HOSPITAL | Age: 60
LOS: 1 days | End: 2025-08-07
Payer: COMMERCIAL

## 2025-08-07 ENCOUNTER — TRANSCRIPTION ENCOUNTER (OUTPATIENT)
Age: 60
End: 2025-08-07

## 2025-08-07 VITALS
RESPIRATION RATE: 18 BRPM | OXYGEN SATURATION: 98 % | DIASTOLIC BLOOD PRESSURE: 77 MMHG | SYSTOLIC BLOOD PRESSURE: 136 MMHG | HEART RATE: 70 BPM

## 2025-08-07 VITALS
TEMPERATURE: 98 F | HEIGHT: 64 IN | DIASTOLIC BLOOD PRESSURE: 92 MMHG | WEIGHT: 197.98 LBS | RESPIRATION RATE: 17 BRPM | OXYGEN SATURATION: 97 % | HEART RATE: 67 BPM | SYSTOLIC BLOOD PRESSURE: 153 MMHG

## 2025-08-07 DIAGNOSIS — Z90.49 ACQUIRED ABSENCE OF OTHER SPECIFIED PARTS OF DIGESTIVE TRACT: Chronic | ICD-10-CM

## 2025-08-07 DIAGNOSIS — Z98.89 OTHER SPECIFIED POSTPROCEDURAL STATES: Chronic | ICD-10-CM

## 2025-08-07 DIAGNOSIS — K63.5 POLYP OF COLON: ICD-10-CM

## 2025-08-07 DIAGNOSIS — Z98.890 OTHER SPECIFIED POSTPROCEDURAL STATES: Chronic | ICD-10-CM

## 2025-08-07 PROCEDURE — C1889: CPT

## 2025-08-07 PROCEDURE — 88305 TISSUE EXAM BY PATHOLOGIST: CPT

## 2025-08-07 PROCEDURE — 45390 COLONOSCOPY W/RESECTION: CPT | Mod: GC

## 2025-08-07 PROCEDURE — 88305 TISSUE EXAM BY PATHOLOGIST: CPT | Mod: 26

## 2025-08-07 PROCEDURE — 45390 COLONOSCOPY W/RESECTION: CPT

## 2025-08-07 DEVICE — NET RETRV ROT ROTH 2.5MMX230CM: Type: IMPLANTABLE DEVICE | Status: FUNCTIONAL

## 2025-08-07 DEVICE — CLIP REPOSITIONABLE HEMOSTASIS 22MMX235CM: Type: IMPLANTABLE DEVICE | Status: FUNCTIONAL

## 2025-08-07 DEVICE — GEL PURASTAT 3ML: Type: IMPLANTABLE DEVICE | Status: FUNCTIONAL

## 2025-08-07 RX ADMIN — Medication 75 MILLILITER(S): at 10:30

## 2025-08-12 LAB — SURGICAL PATHOLOGY STUDY: SIGNIFICANT CHANGE UP

## 2025-08-13 DIAGNOSIS — D12.6 BENIGN NEOPLASM OF COLON, UNSPECIFIED: ICD-10-CM

## 2025-08-13 DIAGNOSIS — Z86.018 PERSONAL HISTORY OF OTHER BENIGN NEOPLASM: ICD-10-CM

## (undated) DEVICE — CATH IV SAFE BC 20G X 1.16" (PINK)

## (undated) DEVICE — FORCEP RADIAL JAW 4 JUMBO 2.8MM 3.2MM 240CM ORANGE DISP

## (undated) DEVICE — CATH IV SAFE BC 22G X 1" (BLUE)

## (undated) DEVICE — SENSOR O2 FINGER ADULT

## (undated) DEVICE — SOL INJ NS 0.9% 500ML 2 PORT

## (undated) DEVICE — ATTACHMENT DISTAL 4X13.4MM

## (undated) DEVICE — TUBING SUCTION 20FT

## (undated) DEVICE — CLAMP BX HOT RAD JAW 3

## (undated) DEVICE — BRUSH COLONOSCOPY CYTOLOGY

## (undated) DEVICE — SUCTION YANKAUER NO CONTROL VENT

## (undated) DEVICE — PACK IV START WITH CHG

## (undated) DEVICE — TUBING IV SET GRAVITY 3Y 100" MACRO

## (undated) DEVICE — FOLEY HOLDER STATLOCK 2 WAY ADULT

## (undated) DEVICE — CATH SPRAY 1.8X2200MM BLU

## (undated) DEVICE — Device

## (undated) DEVICE — POLY TRAP ETRAP

## (undated) DEVICE — IRRIGATOR BIO SHIELD

## (undated) DEVICE — SYR LUER LOK 50CC

## (undated) DEVICE — ELCTR GROUNDING PAD ADULT COVIDIEN

## (undated) DEVICE — TUBING SUCTION CONN 6FT STERILE

## (undated) DEVICE — BIOPSY FORCEP RADIAL JAW 4 STANDARD WITH NEEDLE